# Patient Record
Sex: MALE | Race: WHITE | URBAN - METROPOLITAN AREA
[De-identification: names, ages, dates, MRNs, and addresses within clinical notes are randomized per-mention and may not be internally consistent; named-entity substitution may affect disease eponyms.]

---

## 2017-01-04 ENCOUNTER — PRE VISIT (OUTPATIENT)
Dept: PULMONOLOGY | Facility: CLINIC | Age: 27
End: 2017-01-04

## 2017-01-04 NOTE — TELEPHONE ENCOUNTER
1.  Date/reason for appt:  1/18/17   Allergies    2.  Referring provider:  Self    3.  Call to patient (Yes / No - short description):  Yes, seen at [facility].  Emailed SORAYA form to [email or web address]    Office Visit-12/05/16 Dr Sher    Sleep Visit-12/19/16    4.  Other:  Pt to contact previous provider to get meds sent.

## 2017-01-05 ENCOUNTER — OFFICE VISIT (OUTPATIENT)
Dept: FAMILY MEDICINE | Facility: CLINIC | Age: 27
End: 2017-01-05

## 2017-01-05 VITALS
DIASTOLIC BLOOD PRESSURE: 65 MMHG | WEIGHT: 174.8 LBS | HEIGHT: 74 IN | HEART RATE: 73 BPM | SYSTOLIC BLOOD PRESSURE: 106 MMHG | BODY MASS INDEX: 22.43 KG/M2 | RESPIRATION RATE: 20 BRPM | OXYGEN SATURATION: 97 %

## 2017-01-05 DIAGNOSIS — G47.33 OSA (OBSTRUCTIVE SLEEP APNEA): ICD-10-CM

## 2017-01-05 DIAGNOSIS — R93.1 DECREASED CARDIAC EJECTION FRACTION: Primary | ICD-10-CM

## 2017-01-05 DIAGNOSIS — R68.3 CLUBBING OF NAILS: ICD-10-CM

## 2017-01-05 ASSESSMENT — PAIN SCALES - GENERAL: PAINLEVEL: NO PAIN (0)

## 2017-01-05 NOTE — PATIENT INSTRUCTIONS
Primary Care Center Medication Refill Request Information:  * Please contact your pharmacy regarding ANY request for medication refills.  ** [facility] Prescription Fax = [phone]  * Please allow 3 business days for routine medication refills.  * Please allow 5 business days for controlled substance medication refills.     Primary Care Center Test Result notification information:  *You will be notified with in 7-10 days of your appointment day regarding the results of your test.  If you are on MyChart you will be notified as soon as the provider has reviewed the results and signed off on them.

## 2017-01-05 NOTE — MR AVS SNAPSHOT
After Visit Summary   1/5/2017    Brett Orosco    MRN: [id number]           Patient Information     Date Of Birth          1990        Visit Information        Provider Department      1/5/2017 9:25 AM Robert Sher MD [facility]        Today's Diagnoses     Decreased cardiac ejection fraction    -  1       Care Instructions    Primary Care Center Medication Refill Request Information:  * Please contact your pharmacy regarding ANY request for medication refills.  ** [facility] Prescription Fax = [phone]  * Please allow 3 business days for routine medication refills.  * Please allow 5 business days for controlled substance medication refills.     Primary Care Center Test Result notification information:  *You will be notified with in 7-10 days of your appointment day regarding the results of your test.  If you are on MyChart you will be notified as soon as the provider has reviewed the results and signed off on them.          Follow-ups after your visit        Follow-up notes from your care team     Discussed this visit Return in about 1 year (around 1/5/2018).      Your next 10 appointments already scheduled     Jan 18, 2017  8:35 AM   (Arrive by 8:20 AM)   New Allergy with Charlie Rivera MD   [facility] ([facility])    [address] [address] [phone]           Do not take anti-histamines or Zantac for seven day prior to your appointment.            Feb 24, 2017  8:00 AM   Return Sleep Patient with GAURAV Irene [facility] [place], Sleep Study ([facility])    [address] [place] [address] [phone]              Future tests that were ordered for you today     Open Future Orders        Priority Expected Expires Ordered    Overnight oximetry study Routine 1/5/2017 1/5/2018 1/5/2017     "        Who to contact     Please call your clinic at [phone] to:    Ask questions about your health    Make or cancel appointments    Discuss your medicines    Learn about your test results    Speak to your doctor   If you have compliments or concerns about an experience at your clinic, or if you wish to file a complaint, please contact [facility] Patient Relations at [phone] or email us at [email or web address]         Additional Information About Your Visit        [email or web address] Information     [employer] gives you secure access to your electronic health record. If you see a primary care provider, you can also send messages to your care team and make appointments. If you have questions, please call your primary care clinic.  If you do not have a primary care provider, please call [phone] and they will assist you.      [employer] is an electronic gateway that provides easy, online access to your medical records. With [employer], you can request a clinic appointment, read your test results, renew a prescription or communicate with your care team.     To access your existing account, please contact your [facility] Clinic or call [phone] for assistance.        Care EveryWhere ID     This is your Care EveryWhere ID. This could be used by other organizations to access your [place] medical records  [id number]        Your Vitals Were     Pulse Respirations Height BMI (Body Mass Index) Pulse Oximetry       73 20 1.88 m (6' 2\") 22.43 kg/m2 97%        Blood Pressure from Last 3 Encounters:   01/05/17 106/65   12/19/16 109/54   12/05/16 102/62    Weight from Last 3 Encounters:   01/05/17 79.289 kg (174 lb 12.8 oz)   12/19/16 78.926 kg (174 lb)   12/05/16 77.565 kg (171 lb)               Primary Care Provider Office Phone # Fax #    Robert Sher [phone]        [facility]IC[facility]ANS [address] [place] [address]  [facility] [address]        Thank " you!     Thank you for choosing [facility]  for your care. Our goal is always to provide you with excellent care. Hearing back from our patients is one way we can continue to improve our services. Please take a few minutes to complete the written survey that you may receive in the mail after your visit with us. Thank you!             Your Updated Medication List - Protect others around you: Learn how to safely use, store and throw away your medicines at www.disposemymeds.org.          This list is accurate as of: 1/5/17 10:42 AM.  Always use your most recent med list.                   Brand Name Dispense Instructions for use    CLARITIN PO      Take 10 mg by mouth as needed       fluticasone 50 MCG/ACT spray    FLONASE     Spray 1 spray into both nostrils daily       vitamin D3 2000 UNITS Caps

## 2017-01-05 NOTE — PROGRESS NOTES
"SUBJECTIVE:  Brett Orosco is a 26-year-old gentleman here for followup of last visit where we discussed possibility of obstructive sleep apnea.  He had clubbing of his nails which is a soft call, as he may have family members with nails in the shape he has.  He also has a history of a previous marijuana use, which he has now been abstinent and had an echocardiogram which showed borderline ejection fraction.  He has a more sedentary life recently and is willing to start working towards improved health.  He is not smoking and is motivated for change.    He also has seasonal allergies with rhinitis and will see an allergist soon which I think will help with his nasal congestion.He has been using breath right strips at night assisting with his nighttime breathing.   He is here for followup to discuss test results.  I reviewed his echocardiogram with him showing borderline ejection fraction, everything else looks within normal limits.  The sleep professional recommended possibility of an overnight oximetry which I will order.    He is otherwise feeling well.  Denies any complaints at this time other than some nasal congestion.  Indicates that he went home to [place] where he had more sun exposure and suspects that he has some seasonal affective change as he felt so much better in the sun.  He is currently taking vitamin D, he upped his dose to 5000 units daily.  I discussed that he should probably be on that dose no more than 3 times per week to avoid overdosing as his vitamin D level was in the normal range at the last visit.  He is feeling well at this time and is working on improving his health habits.      OBJECTIVE:/65 mmHg  Pulse 73  Resp 20  Ht 1.88 m (6' 2\")  Wt 79.289 kg (174 lb 12.8 oz)  BMI 22.43 kg/m2  SpO2 97%   GENERAL:  Examination reveals a gentleman who is in no acute distress.     HEENT:  He has some mild nasal congestion.    NECK: Thyroid is normal.  No adenopathy  LUNGS:  Clear.   HEART:  S1, " S2, with regular rate and rhythm.     EXTREMITIES:  Nails, he does have a suggestion of a little bit broader distal nail with possible clubbing, however, on further review, this does appear to be consistent with anatomic variant.   VITAL SIGNS:  His O2 sat today is 97% and blood pressure and vital signs are normal.      ASSESSMENT AND PLAN:  Brett Orosco is a 26-year-old gentleman here for followup.  He is doing well at this time.  He does have  seasonal allergies and Rhinitis with congestion.  He will get in with an allergist.    We are doing a workup for obstructive sleep apnea and will do an overnight oximetry and reviewed his echocardiogram which showed a borderline ejection fraction.  He will make an appointment with Cardiology to clarify  other recommendations, but he will abstain from any type of smoking and he will work on 15-30 minutes most days of the week of aerobic exercise.  I suggested that we repeat his echocardiogram in 1 year.    I reassured him I suspect everything will be normal.  I reviewed his other labs.           Interpretation Summary  Borderline (EF 50-55%) reduced left ventricular function is present. Left  ventricular size is normal. Left ventricular wall thickness is normal. Normal  left ventricular filling for age.  The right ventricle is normal size. Global right ventricular function is  normal.  Both atria appear normal.  Pulmonary artery systolic pressure cannot be assessed.  The inferior vena cava was normal in size with preserved respiratory  variability.  No pericardial effusion is present.  The atrial septum is intact as assessed by color Doppler .    Brett was seen today for results.    Diagnoses and all orders for this visit:    Decreased cardiac ejection fraction  -     Overnight oximetry study; Future  -     order for DME; Overnight oximetry home study    LILI (obstructive sleep apnea)  -     order for DME; Overnight oximetry home study    Clubbing of nails  -     order  for DME; Overnight oximetry home study    All questions were addressed.  He voiced understanding and agreement with the above.     Robert Sher MD

## 2017-01-05 NOTE — NURSING NOTE
Chief Complaint   Patient presents with     Results     patient states he is here to discuss results     REGI TOLBERT at 9:57 AM on 1/5/2017.

## 2017-01-10 ENCOUNTER — APPOINTMENT (OUTPATIENT)
Dept: ULTRASOUND IMAGING | Facility: CLINIC | Age: 27
End: 2017-01-10
Attending: EMERGENCY MEDICINE
Payer: COMMERCIAL

## 2017-01-10 ENCOUNTER — HOSPITAL ENCOUNTER (EMERGENCY)
Facility: CLINIC | Age: 27
Discharge: HOME | End: 2017-01-10
Attending: EMERGENCY MEDICINE | Admitting: EMERGENCY MEDICINE
Payer: COMMERCIAL

## 2017-01-10 VITALS
HEIGHT: 73 IN | TEMPERATURE: 97.1 F | HEART RATE: 64 BPM | WEIGHT: 168 LBS | OXYGEN SATURATION: 98 % | DIASTOLIC BLOOD PRESSURE: 60 MMHG | SYSTOLIC BLOOD PRESSURE: 103 MMHG | BODY MASS INDEX: 22.26 KG/M2 | RESPIRATION RATE: 16 BRPM

## 2017-01-10 DIAGNOSIS — R10.11 RUQ ABDOMINAL PAIN: ICD-10-CM

## 2017-01-10 DIAGNOSIS — K80.50 BILIARY COLIC: ICD-10-CM

## 2017-01-10 LAB
ALBUMIN SERPL-MCNC: 4 G/DL (ref 3.4–5)
ALP SERPL-CCNC: 73 U/L (ref 40–150)
ALT SERPL W P-5'-P-CCNC: 26 U/L (ref 0–70)
ANION GAP SERPL CALCULATED.3IONS-SCNC: 6 MMOL/L (ref 3–14)
AST SERPL W P-5'-P-CCNC: 17 U/L (ref 0–45)
BASOPHILS # BLD AUTO: 0.2 10E9/L (ref 0–0.2)
BASOPHILS NFR BLD AUTO: 4.3 %
BILIRUB DIRECT SERPL-MCNC: <0.1 MG/DL (ref 0–0.2)
BILIRUB SERPL-MCNC: 0.2 MG/DL (ref 0.2–1.3)
BUN SERPL-MCNC: 10 MG/DL (ref 7–30)
CALCIUM SERPL-MCNC: 9 MG/DL (ref 8.5–10.1)
CHLORIDE SERPL-SCNC: 106 MMOL/L (ref 94–109)
CO2 SERPL-SCNC: 29 MMOL/L (ref 20–32)
CREAT SERPL-MCNC: 0.78 MG/DL (ref 0.66–1.25)
DIFFERENTIAL METHOD BLD: NORMAL
EOSINOPHIL # BLD AUTO: 0.2 10E9/L (ref 0–0.7)
EOSINOPHIL NFR BLD AUTO: 3.5 %
ERYTHROCYTE [DISTWIDTH] IN BLOOD BY AUTOMATED COUNT: 12.8 % (ref 10–15)
GFR SERPL CREATININE-BSD FRML MDRD: NORMAL ML/MIN/1.7M2
GLUCOSE SERPL-MCNC: 96 MG/DL (ref 70–99)
HCT VFR BLD AUTO: 41.8 % (ref 40–53)
HGB BLD-MCNC: 14 G/DL (ref 13.3–17.7)
LIPASE SERPL-CCNC: 143 U/L (ref 73–393)
LYMPHOCYTES # BLD AUTO: 2.7 10E9/L (ref 0.8–5.3)
LYMPHOCYTES NFR BLD AUTO: 49.6 %
MCH RBC QN AUTO: 29.2 PG (ref 26.5–33)
MCHC RBC AUTO-ENTMCNC: 33.5 G/DL (ref 31.5–36.5)
MCV RBC AUTO: 87 FL (ref 78–100)
MONOCYTES # BLD AUTO: 0.3 10E9/L (ref 0–1.3)
MONOCYTES NFR BLD AUTO: 5.2 %
NEUTROPHILS # BLD AUTO: 2 10E9/L (ref 1.6–8.3)
NEUTROPHILS NFR BLD AUTO: 37.4 %
PLATELET # BLD AUTO: 248 10E9/L (ref 150–450)
PLATELET # BLD EST: NORMAL 10*3/UL
POTASSIUM SERPL-SCNC: 4.3 MMOL/L (ref 3.4–5.3)
PROT SERPL-MCNC: 7.4 G/DL (ref 6.8–8.8)
RBC # BLD AUTO: 4.79 10E12/L (ref 4.4–5.9)
SODIUM SERPL-SCNC: 140 MMOL/L (ref 133–144)
WBC # BLD AUTO: 5.4 10E9/L (ref 4–11)

## 2017-01-10 PROCEDURE — 76705 ECHO EXAM OF ABDOMEN: CPT | Mod: 26 | Performed by: EMERGENCY MEDICINE

## 2017-01-10 PROCEDURE — 76705 ECHO EXAM OF ABDOMEN: CPT

## 2017-01-10 PROCEDURE — 80048 BASIC METABOLIC PNL TOTAL CA: CPT | Performed by: EMERGENCY MEDICINE

## 2017-01-10 PROCEDURE — 99284 EMERGENCY DEPT VISIT MOD MDM: CPT | Mod: 25 | Performed by: EMERGENCY MEDICINE

## 2017-01-10 PROCEDURE — 76705 ECHO EXAM OF ABDOMEN: CPT | Performed by: EMERGENCY MEDICINE

## 2017-01-10 PROCEDURE — 80076 HEPATIC FUNCTION PANEL: CPT | Performed by: EMERGENCY MEDICINE

## 2017-01-10 PROCEDURE — 85025 COMPLETE CBC W/AUTO DIFF WBC: CPT | Performed by: EMERGENCY MEDICINE

## 2017-01-10 PROCEDURE — 83690 ASSAY OF LIPASE: CPT | Performed by: EMERGENCY MEDICINE

## 2017-01-10 ASSESSMENT — ENCOUNTER SYMPTOMS
ABDOMINAL PAIN: 1
ABDOMINAL DISTENTION: 1
FEVER: 0
SHORTNESS OF BREATH: 0

## 2017-01-10 NOTE — DISCHARGE INSTRUCTIONS
Please make an appointment to follow up with Your Primary Care Provider in 7 days if not improving.  Return for worsening pain, or other concerns.    Call your healthcare provider if any of the following occur:    Pain gets worse or moves to the right lower abdomen    Repeated vomiting    Swelling of the abdomen    Pain lasts over 6 hours    Fever of 100.4  F (38  C) or higher, or as directed by your healthcare provider    Weakness, dizziness    Dark urine or light colored stools    Yellow color of the skin or eyes    Chest, arm, back, neck or jaw pain  Call 911  Get emergency medical care right away if any of these occur:    Trouble breathing    Very confused    Very drowsy or trouble awakening    Fainting or loss of consciousness    Rapid heart rate

## 2017-01-10 NOTE — ED NOTES
PT arrived via foot from work here at the     PT C/O RUQ abdominal pain x 5 days. PT reports pain increases after eating fatty meals. PT reports irregular BM's with constipation, BM's are light in color when they occur.

## 2017-01-10 NOTE — ED AVS SNAPSHOT
[facility], Emergency Department    [address], [place] [address]    Phone:  [phone]                                       Brett Orosco   MRN: [id number]    Department:  [facility], Emergency Department   Date of Visit:  1/10/2017           Patient Information     Date Of Birth          1990        Your diagnoses for this visit were:     RUQ abdominal pain     Biliary colic        You were seen by Hazel Murillo MD.      Follow-up Information     Follow up with Robert Sher MD.    Specialty:  Family Practice    Why:  As needed, If symptoms worsen    Contact information:     PH[facility]ICIANS  [address] [facility] [address]  [facility] [id number] [phone]          Follow up with [facility], Emergency Department.    Specialty:  EMERGENCY MEDICINE    Why:  As needed, If symptoms worsen    Contact information:    [address] [facility] [address] [phone]    Additional information:    The [facility] is located on the corner of [facility] and [facility] on the [facility]. It is easily accessible from virtually any point in the [facility] [place] [facility] area, via [employer] and [employer].        Discharge Instructions       Please make an appointment to follow up with Your Primary Care Provider in 7 days if not improving.  Return for worsening pain, or other concerns.    Call your healthcare provider if any of the following occur:    Pain gets worse or moves to the right lower abdomen    Repeated vomiting    Swelling of the abdomen    Pain lasts over 6 hours    Fever of 100.4  F (38  C) or higher, or as directed by your healthcare provider    Weakness, dizziness    Dark urine or light colored stools    Yellow color of the skin or eyes    Chest, arm, back, neck or jaw pain  Call 911  Get emergency medical care right away if any of these occur:    Trouble breathing    Very confused    Very drowsy or trouble awakening    Fainting or loss of  consciousness    Rapid heart rate    Future Appointments        Provider Department Dept Phone Center    1/18/2017 8:35 AM Charlie Boggs MD [facility] [phone] [facility]    2/24/2017 8:00 AM GAURAV Irene CNP [facility], [place], Sleep Study [phone] [place]      24 Hour Appointment Hotline       To make an appointment at any [facility], call [phone] ([phone]). If you don't have a family doctor or clinic, we will help you find one. [facility] clinics are conveniently located to serve the needs of you and your family.             Review of your medicines      Our records show that you are taking the medicines listed below. If these are incorrect, please call your family doctor or clinic.        Dose / Directions Last dose taken    CLARITIN PO   Dose:  10 mg        Take 10 mg by mouth as needed   Refills:  0        fluticasone 50 MCG/ACT spray   Commonly known as:  FLONASE   Dose:  1 spray   Indication:  Allergic Rhinitis        Spray 1 spray into both nostrils daily   Refills:  0        order for DME   Quantity:  1 each        Overnight oximetry home study   Refills:  0        vitamin D3 2000 UNITS Caps        Refills:  0                Procedures and tests performed during your visit     Basic metabolic panel    CBC with platelets differential    Hepatic panel    Lipase    POC US ABDOMEN LIMITED    US Abdomen Limited      Orders Needing Specimen Collection     None      Pending Results     No orders found from 1/9/2017 to 1/11/2017.            Pending Culture Results     No orders found from 1/9/2017 to 1/11/2017.            Thank you for choosing [facility]       Thank you for choosing [facility] for your care. Our goal is always to provide you with excellent care. Hearing back from our patients is one way we can continue to improve our services. Please take a few minutes to complete the written survey that you may receive in the mail after  you visit with us. Thank you!        [employer] Information     [employer] gives you secure access to your electronic health record. If you see a primary care provider, you can also send messages to your care team and make appointments. If you have questions, please call your primary care clinic.  If you do not have a primary care provider, please call [phone] and they will assist you.        Care EveryWhere ID     This is your Care EveryWhere ID. This could be used by other organizations to access your [place] medical records  [id number]        After Visit Summary       This is your record. Keep this with you and show to your community pharmacist(s) and doctor(s) at your next visit.

## 2017-01-10 NOTE — ED PROVIDER NOTES
[place] EMERGENCY DEPARTMENT ([facility])  January 10, 2017  ED 8 1:27 PM   History     Chief Complaint   Patient presents with     Abdominal Pain     The history is provided by the patient.     Brett Orosco is a 26 year old male who presents with right upper quadrant abdominal pain and pale stools. He states symptoms started Thursday night (5 days ago) with right upper quadrant abdominal pain that worsens within 15 minutes after eating, particularly with eating fatty meals. He also has abdominal bloating after eating.  He has noticed that his bowel movements have been lighter colored. He had breakfast this morning at around 7:30 and is still feeling uncomfortable with this. He is concerned for cholelithiasis. He states the pain is not debilitating but is quite noticeable after eating. He notes history of hyperlipidemia and poor diet in the past and wonders if it has caught up with him. He feels a little constipated as well. No dysuria, hematuria, or difficulty urinating.     I have reviewed the Medications, Allergies, Past Medical and Surgical History, and Social History in the [facility] system.  PAST MEDICAL HISTORY:   Past Medical History   Diagnosis Date     Clubbing of nail      LILI (obstructive sleep apnea)      Seasonal allergies      requires immunotherapy       PAST SURGICAL HISTORY:   Past Surgical History   Procedure Laterality Date     Tonsillectomy & adenoidectomy  1993     Left arm fracture       open repair       FAMILY HISTORY:   Family History   Problem Relation Age of Onset     Lupus Mother 30     Lupus Maternal Aunt 38     Skin Cancer Cousin        SOCIAL HISTORY:   Social History   Substance Use Topics     Smoking status: Never Smoker      Smokeless tobacco: Not on file     Alcohol Use: No       Current Discharge Medication List      CONTINUE these medications which have NOT CHANGED    Details   order for DME Overnight oximetry home study  Qty: 1 each, Refills: 0    Associated  "Diagnoses: Decreased cardiac ejection fraction; LILI (obstructive sleep apnea); Clubbing of nails      Cholecalciferol (VITAMIN D3) 2000 UNITS CAPS       Loratadine (CLARITIN PO) Take 10 mg by mouth as needed      fluticasone (FLONASE) 50 MCG/ACT spray Spray 1 spray into both nostrils daily                Allergies   Allergen Reactions     Cats      Seasonal Allergies Other (See Comments)          Review of Systems   Constitutional: Negative for fever.   Respiratory: Negative for shortness of breath.    Cardiovascular: Negative for chest pain.   Gastrointestinal: Positive for abdominal pain and abdominal distention (bloating).        Light colored stools   All other systems reviewed and are negative.      Physical Exam   BP: 122/67 mmHg  Pulse: 63  Temp: 97.1  F (36.2  C)  Resp: 18  Height: 185.4 cm (6' 1\")  Weight: 76.204 kg (168 lb)  SpO2: 95 %  Physical Exam   Constitutional: He appears well-developed and well-nourished. No distress.   Eyes: Right eye exhibits no discharge. Left eye exhibits no discharge.   Cardiovascular: Normal rate.    No murmur heard.  Pulmonary/Chest: Effort normal. No stridor. No respiratory distress.   Abdominal: He exhibits no distension. There is no tenderness.   Musculoskeletal: He exhibits no edema.   Neurological: He is alert. No cranial nerve deficit.   Skin: Skin is warm. He is not diaphoretic. No erythema.       ED Course   Procedures  Results for orders placed or performed during the hospital encounter of 01/10/17 (from the past 24 hour(s))   CBC with platelets differential   Result Value Ref Range    WBC 5.4 4.0 - 11.0 10e9/L    RBC Count 4.79 4.4 - 5.9 10e12/L    Hemoglobin 14.0 13.3 - 17.7 g/dL    Hematocrit 41.8 40.0 - 53.0 %    MCV 87 78 - 100 fl    MCH 29.2 26.5 - 33.0 pg    MCHC 33.5 31.5 - 36.5 g/dL    RDW 12.8 10.0 - 15.0 %    Platelet Count 248 150 - 450 10e9/L    Diff Method Manual Differential     % Neutrophils 37.4 %    % Lymphocytes 49.6 %    % Monocytes 5.2 %    % " Eosinophils 3.5 %    % Basophils 4.3 %    Absolute Neutrophil 2.0 1.6 - 8.3 10e9/L    Absolute Lymphocytes 2.7 0.8 - 5.3 10e9/L    Absolute Monocytes 0.3 0.0 - 1.3 10e9/L    Absolute Eosinophils 0.2 0.0 - 0.7 10e9/L    Absolute Basophils 0.2 0.0 - 0.2 10e9/L    Platelet Estimate Confirming automated cell count    Basic metabolic panel   Result Value Ref Range    Sodium 140 133 - 144 mmol/L    Potassium 4.3 3.4 - 5.3 mmol/L    Chloride 106 94 - 109 mmol/L    Carbon Dioxide 29 20 - 32 mmol/L    Anion Gap 6 3 - 14 mmol/L    Glucose 96 70 - 99 mg/dL    Urea Nitrogen 10 7 - 30 mg/dL    Creatinine 0.78 0.66 - 1.25 mg/dL    GFR Estimate >90  Non  GFR Calc   >60 mL/min/1.7m2    GFR Estimate If Black >90   GFR Calc   >60 mL/min/1.7m2    Calcium 9.0 8.5 - 10.1 mg/dL   Lipase   Result Value Ref Range    Lipase 143 73 - 393 U/L   Hepatic panel   Result Value Ref Range    Bilirubin Direct <0.1 0.0 - 0.2 mg/dL    Bilirubin Total 0.2 0.2 - 1.3 mg/dL    Albumin 4.0 3.4 - 5.0 g/dL    Protein Total 7.4 6.8 - 8.8 g/dL    Alkaline Phosphatase 73 40 - 150 U/L    ALT 26 0 - 70 U/L    AST 17 0 - 45 U/L   POC US ABDOMEN LIMITED    [facility] Procedure Note      Limited Bedside ED Gallbladder  Ultrasound:    PROCEDURE: PERFORMED BY: Dr. Hazel Murillo  INDICATIONS:  RUQ/Epigastric Pain  PROBE:  Low frequency convex probe  BODY LOCATION: Abdomen  FINDINGS:   An ultrasound of the gallbladder was performed using  longitudinal and transverse views.  Gallstone(s):  Absent  Gallbladder sludge:  Possible  Sonographic Baxter's sign:  Absent  Gallbladder wall thickening (greater than 4 mm):  Absent  Pericholecystic fluid: Absent  Common bile duct (dilated if internal diameter greater than 6  mm): 3.1 mm   INTERPRETATION:  The gallbladder evaluation is normal with no  gallstones/sludge, no sonographic Baxter s sign, no GB wall  thickening, no pericholecystic fluid, and without evidence  of  cholelithiasis or cholecystitis.  IMAGE DOCUMENTATION: Images were archived to PACs system.    Hazel Murillo MD       US Abdomen Limited    Narrative    EXAMINATION: Limited Abdominal Ultrasound, 1/10/2017 2:59 PM     COMPARISON: None.    HISTORY: Postprandial abdominal pain.    FINDINGS:   Fluid: No evidence of ascites or pleural effusions.    Liver: The liver demonstrates normal echotexture, measuring 15.2 cm in  craniocaudal dimension. There is no focal mass.     Gallbladder: There is no wall thickening, pericholecystic fluid,  positive sonographic Baxter's sign or evidence for cholelithiasis.    Bile Ducts: Both the intra- and extrahepatic biliary system are of  normal caliber.  The common bile duct measures 3 mm in diameter.    Pancreas: Visualized portions of the pancreas are unremarkable.     Kidney: The right kidney measures 11.1 cm long. There is no  hydronephrosis or mass.       Impression    IMPRESSION:     Negative right upper quadrant ultrasound. No evidence of  cholelithiasis or cholecystitis.    SABRINA MONCADA MD     Medications - No data to display      Assessments & Plan (with Medical Decision Making)   26 year old male who is presenting with symptoms that are quite consistent with biliary etiologies. However, the patient s labs and Emergency Department ultrasound were within normal limits. Given that I saw what may have been a bit of sludge in the gallbladder, I did send her for a formal ultrasound which confirmed a normal gallbladder. Patient does not have any pain at this point and his pain is only episodic after eating.  This is still suspicious for biliary pathologies. We talked about follow up with his primary-care provider and for further evaluation in the outpatient setting if his pain persist. No emergent concern at this point.     I have reviewed the nursing notes.    I have reviewed the findings, diagnosis, plan and need for follow up with the patient.    Current Discharge Medication  List          Final diagnoses:   RUQ abdominal pain   Biliary colic     IShayla, am serving as a trained medical scribe to document services personally performed by Hazel Murillo MD, based on the provider's statements to me.  This document has been checked and approved by the attending provider.    Hazel SIMMS MD, was physically present and have reviewed and verified the accuracy of this note documented by Shayla Garcia, medical scribe.     1/10/2017   [facility], [place], EMERGENCY DEPARTMENT      Hazel Murillo MD  01/10/17 [id number]

## 2017-01-10 NOTE — ED AVS SNAPSHOT
[facility], [place], Emergency Department    [address] [place] [address]    Phone:  [phone]                                       Brett Orosco   MRN: [id number]    Department:  [facility], Emergency Department   Date of Visit:  1/10/2017           After Visit Summary Signature Page     I have received my discharge instructions, and my questions have been answered. I have discussed any challenges I see with this plan with the nurse or doctor.    ..........................................................................................................................................  Patient/Patient Representative Signature      ..........................................................................................................................................  Patient Representative Print Name and Relationship to Patient    ..................................................               ................................................  Date                                            Time    ..........................................................................................................................................  Reviewed by Signature/Title    ...................................................              ..............................................  Date                                                            Time

## 2017-01-11 ENCOUNTER — CARE COORDINATION (OUTPATIENT)
Dept: PULMONOLOGY | Facility: CLINIC | Age: 27
End: 2017-01-11

## 2017-01-11 NOTE — PROGRESS NOTES
Writer contacted patient to remind him to hold antihistamines for a week before appointment. He stated that his allergy testing was completed in [place], and that [place] was to be shipping over his allergy serums. At this time they have not arrived to clinic. Patient agrees to hold his claritin for the week in the event other testing needs to be done while at currently scheduled appointment.

## 2017-01-13 ASSESSMENT — ENCOUNTER SYMPTOMS
HEARTBURN: 1
TASTE DISTURBANCE: 0
SMELL DISTURBANCE: 0
BLOATING: 1
BOWEL INCONTINENCE: 0
CONSTIPATION: 1
DIARRHEA: 0
RECTAL PAIN: 0
VOMITING: 0
SINUS CONGESTION: 1
NAUSEA: 0
NECK MASS: 0
ABDOMINAL PAIN: 1
SINUS PAIN: 0
JAUNDICE: 0
SORE THROAT: 0
TROUBLE SWALLOWING: 0
BLOOD IN STOOL: 0
HOARSE VOICE: 0

## 2017-01-17 ENCOUNTER — TRANSFERRED RECORDS (OUTPATIENT)
Dept: HEALTH INFORMATION MANAGEMENT | Facility: CLINIC | Age: 27
End: 2017-01-17

## 2017-01-17 ENCOUNTER — TELEPHONE (OUTPATIENT)
Dept: SLEEP MEDICINE | Facility: CLINIC | Age: 27
End: 2017-01-17

## 2017-01-17 NOTE — TELEPHONE ENCOUNTER
Records received from [facility], will forward to clinic. Waiting for image.   Included:   Home sleep study   Xray chest on 11/6/14  Allergy test

## 2017-01-18 ENCOUNTER — OFFICE VISIT (OUTPATIENT)
Dept: PULMONOLOGY | Facility: CLINIC | Age: 27
End: 2017-01-18
Attending: ALLERGY & IMMUNOLOGY
Payer: COMMERCIAL

## 2017-01-18 VITALS
DIASTOLIC BLOOD PRESSURE: 61 MMHG | HEART RATE: 72 BPM | RESPIRATION RATE: 16 BRPM | OXYGEN SATURATION: 94 % | SYSTOLIC BLOOD PRESSURE: 96 MMHG

## 2017-01-18 DIAGNOSIS — J30.81 ALLERGIC RHINITIS DUE TO ANIMAL HAIR AND DANDER, UNSPECIFIED RHINITIS SEASONALITY: Primary | ICD-10-CM

## 2017-01-18 DIAGNOSIS — J30.81 NON-SEASONAL ALLERGIC RHINITIS DUE TO ANIMAL HAIR AND DANDER: ICD-10-CM

## 2017-01-18 PROCEDURE — 99212 OFFICE O/P EST SF 10 MIN: CPT | Mod: ZF

## 2017-01-18 PROCEDURE — 99212 OFFICE O/P EST SF 10 MIN: CPT | Mod: 25

## 2017-01-18 PROCEDURE — 95117 IMMUNOTHERAPY INJECTIONS: CPT | Mod: ZF

## 2017-01-18 ASSESSMENT — PAIN SCALES - GENERAL: PAINLEVEL: NO PAIN (0)

## 2017-01-18 NOTE — MR AVS SNAPSHOT
After Visit Summary   1/18/2017    Brett Orosco    MRN: [id number]           Patient Information     Date Of Birth          1990        Visit Information        Provider Department      1/18/2017 8:35 AM Charlie Rivera MD [facility]        Today's Diagnoses     Allergic rhinitis due to animal hair and dander, unspecified rhinitis seasonality    -  1     Non-seasonal allergic rhinitis due to animal hair and dander            Follow-ups after your visit        Follow-up notes from your care team     Return in about 3 months (around 4/18/2017).      Your next 10 appointments already scheduled     Jan 25, 2017  9:30 AM   Nurse Visit with [facility] NURSE   [facility] ([facility])    [address] [facility] [address] [phone]            Feb 24, 2017  8:00 AM   Return Sleep Patient with GAURAV Irene [facility] [place], Sleep Study ([facility])    [address] [place] [address] [phone]              Who to contact     If you have questions or need follow up information about today's clinic visit or your schedule please contact [facility] directly at [phone].  Normal or non-critical lab and imaging results will be communicated to you by MyChart, letter or phone within 4 business days after the clinic has received the results. If you do not hear from us within 7 days, please contact the clinic through MyChart or phone. If you have a critical or abnormal lab result, we will notify you by phone as soon as possible.  Submit refill requests through [facility] or call your pharmacy and they will forward the refill request to us. Please allow 3 business days for your refill to be completed.          Additional Information About Your Visit        MyChart Information      [employer] gives you secure access to your electronic health record. If you see a primary care provider, you can also send messages to your care team and make appointments. If you have questions, please call your primary care clinic.  If you do not have a primary care provider, please call [phone] and they will assist you.        Care EveryWhere ID     This is your Care EveryWhere ID. This could be used by other organizations to access your [place] medical records  [id number]        Your Vitals Were     Pulse Respirations Pulse Oximetry             72 16 94%          Blood Pressure from Last 3 Encounters:   01/18/17 96/61   01/10/17 103/60   01/05/17 106/65    Weight from Last 3 Encounters:   01/10/17 76.204 kg (168 lb)   01/05/17 79.289 kg (174 lb 12.8 oz)   12/19/16 78.926 kg (174 lb)              Today, you had the following     No orders found for display       Primary Care Provider Office Phone # Fax #    Robert Sher [phone]        [facility] [address] [place] [address] [phone]  [facility] [address]        Thank you!     Thank you for choosing [facility]  for your care. Our goal is always to provide you with excellent care. Hearing back from our patients is one way we can continue to improve our services. Please take a few minutes to complete the written survey that you may receive in the mail after your visit with us. Thank you!             Your Updated Medication List - Protect others around you: Learn how to safely use, store and throw away your medicines at www.disposemymeds.org.          This list is accurate as of: 1/18/17  9:25 AM.  Always use your most recent med list.                   Brand Name Dispense Instructions for use    CLARITIN PO      Take 10 mg by mouth as needed       fluticasone 50 MCG/ACT spray    FLONASE     Spray 1 spray into both nostrils daily       order for DME     1 each    Overnight oximetry home study       vitamin D3  2000 UNITS Caps

## 2017-01-18 NOTE — PROGRESS NOTES
Reason for Visit  Brett Orosco is a 26 year old male who is referred by Robert Sher for allergies    Allergy HPI  SUBJECTIVE:  Brett presents today for initial consultation regarding concern of environmental allergies.  He has had allergies most of his life.  He was on allergy shots in 2012 for about 6-8 months and did notice a significant benefit and then he moved to [place] and was working at [employer] and noted a lot of problems there and therefore at the [facility] he was started back on allergy shots in August; however, he has not gotten up very far and then stopped getting the injections when he moved here.  He does not have any problems with the injections and it was noted that he is on propranolol occasionally; however, he has not taken that in a few months.  He also notes that his main symptoms are chronic nasal congestion which seems to be constant, occasional sneezing, itchy eyes, itchy nose, dogs and cats seem to cause problems for him.  He uses Flonase 1 spray each nostril daily as well as Claritin.  He used to use Sudafed regularly.  It did help but it did seem to make him anxious.  He also has some sleep problems.  He has tried CPAP.  He has trouble falling asleep and staying asleep.  He wonders if allergies are a factor.  He did have a pulse ox at home and says the levels at night are about 94-95%.  He generally says his breathing is fine.  He sometimes wakes up coughing in the morning.  He used to smoke for a long time but he is otherwise without any respiratory issues.      SOCIAL HISTORY:  He has no pets, but his girlfriend has a family with cat and dog and they live close by.  He used to smoke from age 16 up until this summer.         The patient was seen and examined by Charlie Boggs MD   Current Outpatient Prescriptions   Medication     order for DME     Cholecalciferol (VITAMIN D3) 2000 UNITS CAPS     Loratadine (CLARITIN PO)     fluticasone (FLONASE) 50  MCG/ACT spray     No current facility-administered medications for this visit.     Allergies   Allergen Reactions     Cats      Seasonal Allergies Other (See Comments)     Social History     Social History     Marital Status: Single     Spouse Name: N/A     Number of Children: N/A     Years of Education: N/A     Occupational History     Not on file.     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  BP 96/61 mmHg  Pulse 72  Resp 16  SpO2 94%  Exam:   GENERAL APPEARANCE: Well developed, well nourished, alert, and in no apparent distress.  EYES: PERRL, EOMI, conjunctiva clear non-injected  HENT: Nasal mucosa with no edema and no discharge. No nasal polyps.    EARS: Canals clear, TMs normal  MOUTH: Oral mucosa is moist, without any lesions, no tonsillar enlargement, no oropharyngeal exudate.  RESP: Good air flow throughout.  No crackles. No rhonchi. No wheezes.  CV: Normal S1, S2, regular rhythm, normal rate. No murmur.  No rub. No gallop. No LE edema.   MS: Extremities normal. No clubbing. No cyanosis.  SKIN: No rashes noted  NEURO: Speech normal, normal strength and tone, normal gait and stance  PSYCH: Normal  mentation, orientation to person, place, and time.  Results:Skin tests from an outside clinic on 07/14/2016 show positive sensitivities to skinny, birch, elm, hickory, maple, oak, tree pollen, grass mix, ragweed, as well as weed mix, Alternaria mold, cladosporium mold, Aspergillus mold, Helminthosporium mold and Fusarium mold, dog, cat and dust mites were positive.  He was negative to cockroach, negative penicillium mix.           Assessment and plan: ASSESSMENT AND PLAN:  Brett has a history of allergic rhinitis was tested 20 [place] at [facility], and was noted to have many allergens and is on allergy immunotherapy.  We will continue his treatment here.  He is a very early phase on a sensitive schedule and will see how he is doing.  If he is not doing well, we will probably retest him as he is on high dose allergy shots.  I do recommend he increase his Flonase 2 sprays in each nostril daily as well as continue the Claritin for now.  We did discuss his concern of clubbing.  His mom said that he has clubbing of his fingers and actually looking at them, I do not feel that there is actually clubbing, just mildly abnormal shaped fingernails.  He was concerned about his pulse ox dropping to 94-95% when sleeping.  I am not too concerned about that level at this time, but he can follow up with whoever gave him that pulse ox machine.  It is good he stopped smoking.  There was a concern about thrush in his mouth.  I do feel like this is thrush, most likely irritation from chronic smoking.  I will see him back in about 3 months.                 Answers for HPI/ROS submitted by the patient on 1/13/2017   General Symptoms: No  Skin Symptoms: No  HENT Symptoms: Yes  EYE SYMPTOMS: No  HEART SYMPTOMS: No  LUNG SYMPTOMS: No  INTESTINAL SYMPTOMS: Yes  URINARY SYMPTOMS: No  REPRODUCTIVE SYMPTOMS: No  SKELETAL SYMPTOMS: No  BLOOD SYMPTOMS: No  NERVOUS SYSTEM SYMPTOMS: No  MENTAL HEALTH SYMPTOMS: No  Ear pain:  No  Ear discharge: No  Hearing loss: No  Tinnitus: No  Nosebleeds: No  Congestion: Yes  Sinus pain: No  Trouble swallowing: No   Voice hoarseness: No  Mouth sores: No  Sore throat: No  Tooth pain: No  Gum tenderness: No  Bleeding gums: No  Change in taste: No  Change in sense of smell: No  Dry mouth: No  Hearing aid used: No  Neck lump: No  Heart burn or indigestion: Yes  Nausea: No  Vomiting: No  Abdominal pain: Yes  Bloating: Yes  Constipation: Yes  Diarrhea: No  Blood in stool: No  Black stools: No  Rectal or Anal pain: No  Fecal incontinence: No  Yellowing of skin or eyes: No  Vomit with blood: No  Change in stools: Yes  Hemorrhoids: No

## 2017-01-18 NOTE — Clinical Note
1/18/2017       RE: Brett Orosco  [address]  [facility] [address]     Dear Colleague,    Thank you for referring your patient, Brett Orosco, to the [facility] FOR [facility]G [facility]ENCE AND HE[facility] at [facility]. Please see a copy of my visit note below.    Reason for Visit  Brett Orosco is a 26 year old male who is referred by Robert Sher for allergies    Allergy HPI  SUBJECTIVE:  Brett presents today for initial consultation regarding concern of environmental allergies.  He has had allergies most of his life.  He was on allergy shots in 2012 for about 6-8 months and did notice a significant benefit and then he moved to [place] and was working at [employer] and noted a lot of problems there and therefore at the [facility] he was started back on allergy shots in August; however, he has not gotten up very far and then stopped getting the injections when he moved here.  He does not have any problems with the injections and it was noted that he is on propranolol occasionally; however, he has not taken that in a few months.  He also notes that his main symptoms are chronic nasal congestion which seems to be constant, occasional sneezing, itchy eyes, itchy nose, dogs and cats seem to cause problems for him.  He uses Flonase 1 spray each nostril daily as well as Claritin.  He used to use Sudafed regularly.  It did help but it did seem to make him anxious.  He also has some sleep problems.  He has tried CPAP.  He has trouble falling asleep and staying asleep.  He wonders if allergies are a factor.  He did have a pulse ox at home and says the levels at night are about 94-95%.  He generally says his breathing is fine.  He sometimes wakes up coughing in the morning.  He used to smoke for a long time but he is otherwise without any respiratory issues.      SOCIAL HISTORY:  He has no pets, but his girlfriend has a family with cat and dog and they  live close by.  He used to smoke from age 16 up until this summer.         The patient was seen and examined by Charlie Boggs MD   Current Outpatient Prescriptions   Medication     order for DME     Cholecalciferol (VITAMIN D3) 2000 UNITS CAPS     Loratadine (CLARITIN PO)     fluticasone (FLONASE) 50 MCG/ACT spray     No current facility-administered medications for this visit.     Allergies   Allergen Reactions     Cats      Seasonal Allergies Other (See Comments)     Social History     Social History     Marital Status: Single     Spouse Name: N/A     Number of Children: N/A     Years of Education: N/A     Occupational History     Not on file.     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     Significant other female     Past Medical History   Diagnosis Date     Clubbing of nail      LILI (obstructive sleep apnea)      Seasonal allergies      requires immunotherapy     Past Surgical History   Procedure Laterality Date     Tonsillectomy & adenoidectomy  1993     Left arm fracture       open repair     Family History   Problem Relation Age of Onset     Lupus Mother 30     Lupus Maternal Aunt 38     Skin Cancer Cousin          ROS   A complete ROS was otherwise negative except as noted in the HPI and the end of the note.  BP 96/61 mmHg  Pulse 72  Resp 16  SpO2 94%  Exam:   GENERAL APPEARANCE: Well developed, well nourished, alert, and in no apparent distress.  EYES: PERRL, EOMI, conjunctiva clear non-injected  HENT: Nasal mucosa with no edema and no discharge. No nasal polyps.    EARS: Canals clear, TMs normal  MOUTH: Oral mucosa is moist, without any lesions, no tonsillar enlargement, no oropharyngeal exudate.  RESP: Good air  flow throughout.  No crackles. No rhonchi. No wheezes.  CV: Normal S1, S2, regular rhythm, normal rate. No murmur.  No rub. No gallop. No LE edema.   MS: Extremities normal. No clubbing. No cyanosis.  SKIN: No rashes noted  NEURO: Speech normal, normal strength and tone, normal gait and stance  PSYCH: Normal mentation, orientation to person, place, and time.  Results:Skin tests from an outside clinic on 07/14/2016 show positive sensitivities to skinny, birch, elm, hickory, maple, oak, tree pollen, grass mix, ragweed, as well as weed mix, Alternaria mold, cladosporium mold, Aspergillus mold, Helminthosporium mold and Fusarium mold, dog, cat and dust mites were positive.  He was negative to cockroach, negative penicillium mix.           Assessment and plan: ASSESSMENT AND PLAN:  Brett has a history of allergic rhinitis was tested [address] [place] [address] at [facility], and was noted to have many allergens and is on allergy immunotherapy.  We will continue his treatment here.  He is a very early phase on a sensitive schedule and will see how he is doing.  If he is not doing well, we will probably retest him as he is on high dose allergy shots.  I do recommend he increase his Flonase 2 sprays in each nostril daily as well as continue the Claritin for now.  We did discuss his concern of clubbing.  His mom said that he has clubbing of his fingers and actually looking at them, I do not feel that there is actually clubbing, just mildly abnormal shaped fingernails.  He was concerned about his pulse ox dropping to 94-95% when sleeping.  I am not too concerned about that level at this time, but he can follow up with whoever gave him that pulse ox machine.  It is good he stopped smoking.  There was a concern about thrush in his mouth.  I do feel like this is thrush, most likely irritation from chronic smoking.  I will see him back in about 3 months.     Again, thank you for allowing me to participate in the care of your  patient.      Sincerely,    Charlie Boggs MD

## 2017-01-18 NOTE — NURSING NOTE
Brett did not stay in our lobby for 30 minutes in case of reaction, his injection sites were not evaluated before he left.  We will reiterate the importance of him waiting 30 minutes in our lobby at next visit. Yasmine Del Toro CMA on 1/18/2017

## 2017-01-18 NOTE — NURSING NOTE
Chief Complaint   Patient presents with     Allergy Consult     Patient is being seen for consultation of allergies      Teresita Perez CMA at 8:39 AM on 1/18/2017

## 2017-01-20 ENCOUNTER — TELEPHONE (OUTPATIENT)
Dept: SLEEP MEDICINE | Facility: CLINIC | Age: 27
End: 2017-01-20

## 2017-01-20 NOTE — TELEPHONE ENCOUNTER
----- Message from GAURAV Irene CNP sent at 1/15/2017  7:20 PM CST -----  Please schedule laurel as ordered by Dr. Sher.    Let me know if this doesn't work.  Pt has follow up with Me.    Thanks,  WM

## 2017-01-20 NOTE — TELEPHONE ENCOUNTER
Spoke with patient he states that Brittanie delivered pulse oximeter to him on 1/17/17 and picked up the next day.

## 2017-01-23 ENCOUNTER — TELEPHONE (OUTPATIENT)
Dept: SLEEP MEDICINE | Facility: CLINIC | Age: 27
End: 2017-01-23

## 2017-01-23 NOTE — TELEPHONE ENCOUNTER
WI sleep with a home sleep study, RDI of 11.8, supine of 25.4, overnight oximetry probe off for 2 periods of time, likely underestimating severity of his study.  4 mins with 02 sat <88%.  Download of apap 5-10cm h20 shows residual AI 7.8, AHI 8.5, central  5.1.      With call, states sleep was not better with apap.     PCP has ordered his laurel: pt states light was off on the machine with a wakeup at 4A, he turned the machine back on and fell asleep again.    Currently treated his allergies: started allergy shots, nasal sprays, humidifier, breathe rites.     He's started bright light therapy, feels better with it.

## 2017-01-25 ENCOUNTER — ALLIED HEALTH/NURSE VISIT (OUTPATIENT)
Dept: PULMONOLOGY | Facility: CLINIC | Age: 27
End: 2017-01-25
Attending: ALLERGY & IMMUNOLOGY
Payer: COMMERCIAL

## 2017-01-25 DIAGNOSIS — Z51.6 NEED FOR DESENSITIZATION TO ALLERGENS: Primary | ICD-10-CM

## 2017-01-25 PROCEDURE — 95117 IMMUNOTHERAPY INJECTIONS: CPT | Mod: ZF

## 2017-01-25 NOTE — MR AVS SNAPSHOT
After Visit Summary   1/25/2017    Brett Orosco    MRN: [id number]           Patient Information     Date Of Birth          1990        Visit Information        Provider Department      1/25/2017 9:30 AM [facility] NURSE [facility]        Today's Diagnoses     Need for desensitization to allergens    -  1        Follow-ups after your visit        Your next 10 appointments already scheduled     Jan 25, 2017  9:30 AM   Nurse Visit with [facility] NURSE   [facility] ([facility])    [address] [facility]  [address] [facility] [address]   [phone]            Jan 30, 2017 10:00 AM   Nurse Visit with [facility] NURSE   [facility] ([facility])    [address] [facility]  [address] [facility] [address]   [phone]            Feb 03, 2017  8:30 AM   Nurse Visit with [facility] NURSE   [facility] ([facility])    [address] [address]   [phone]            Feb 06, 2017  9:00 AM   Nurse Visit with [facility] NURSE   [facility] for Lung Science and Health ([facility])    [address] [facility]  [address] [facility] [address]   [phone]            Feb 10, 2017  8:30 AM   Nurse Visit with [facility] NURSE   [facility] for [facility] and Health ([facility])    [address] [address]   [phone]            Feb 24, 2017  8:00 AM   Return Sleep Patient with GAURAV Irene [facility] [place], Sleep Study ([facility])    [address] [place] [id number] [phone]              Who to contact     If you have questions or need follow up information about today's clinic visit or your schedule  please contact [facility] FOR LUNG SCIENCE AND HEALTH directly at [phone].  Normal or non-critical lab and imaging results will be communicated to you by MyChart, letter or phone within 4 business days after the clinic has received the results. If you do not hear from us within 7 days, please contact the clinic through [employer] or phone. If you have a critical or abnormal lab result, we will notify you by phone as soon as possible.  Submit refill requests through [employer] or call your pharmacy and they will forward the refill request to us. Please allow 3 business days for your refill to be completed.          Additional Information About Your Visit        [employer] Information     [employer] gives you secure access to your electronic health record. If you see a primary care provider, you can also send messages to your care team and make appointments. If you have questions, please call your primary care clinic.  If you do not have a primary care provider, please call [phone] and they will assist you.        Care EveryWhere ID     This is your Care EveryWhere ID. This could be used by other organizations to access your [place] medical records  [id number]         Blood Pressure from Last 3 Encounters:   01/18/17 96/61   01/10/17 103/60   01/05/17 106/65    Weight from Last 3 Encounters:   01/10/17 76.204 kg (168 lb)   01/05/17 79.289 kg (174 lb 12.8 oz)   12/19/16 78.926 kg (174 lb)              Today, you had the following     No orders found for display       Primary Care Provider Office Phone # Fax #    Robert Sher [phone]        [facility]ICIANS [address] [place] [address]  [facility] [address]        Thank you!     Thank you for choosing [facility] LUNG SCIENCE AND HEALTH  for your care. Our goal is always to provide you with excellent care. Hearing back from our patients is one way we can continue to improve our services. Please take a few minutes to complete the written  survey that you may receive in the mail after your visit with us. Thank you!             Your Updated Medication List - Protect others around you: Learn how to safely use, store and throw away your medicines at www.disposemymeds.org.          This list is accurate as of: 1/25/17  9:26 AM.  Always use your most recent med list.                   Brand Name Dispense Instructions for use    CLARITIN PO      Take 10 mg by mouth as needed       fluticasone 50 MCG/ACT spray    FLONASE     Spray 1 spray into both nostrils daily       order for DME     1 each    Overnight oximetry home study       vitamin D3 2000 UNITS Caps

## 2017-01-25 NOTE — NURSING NOTE
Patient came in for weekly Allergy injection. Patient had no reaction to last weeks shots. I gave 0.2 ml sub cutaneous without any issues.     Brett Orosco comes into clinic today at the request of Robert Sher for allergy injections    No reaction to last injection.    This service provided today was under the direct supervision of Dr. Rivera, who was available if needed.    Kishor Holliday, OLIVIER Holliday CMA at 9:06 AM on 1/25/2017

## 2017-01-30 ENCOUNTER — ALLIED HEALTH/NURSE VISIT (OUTPATIENT)
Dept: PULMONOLOGY | Facility: CLINIC | Age: 27
End: 2017-01-30
Attending: ALLERGY & IMMUNOLOGY
Payer: COMMERCIAL

## 2017-01-30 DIAGNOSIS — Z51.6 NEED FOR DESENSITIZATION TO ALLERGENS: Primary | ICD-10-CM

## 2017-01-30 PROCEDURE — 95117 IMMUNOTHERAPY INJECTIONS: CPT | Mod: ZF

## 2017-01-30 NOTE — MR AVS SNAPSHOT
After Visit Summary   1/30/2017    Brett Orosco    MRN: [id number]           Patient Information     Date Of Birth          1990        Visit Information        Provider Department      1/30/2017 10:00 AM [facility] NURSE [facility]         Follow-ups after your visit        Your next 10 appointments already scheduled     Feb 03, 2017  8:30 AM   Nurse Visit with [facility] NURSE   [facility] ([facility])    [address] [facility]  [address] [facility] [address] [phone]            Feb 06, 2017  9:00 AM   Nurse Visit with [facility] NURSE   [facility] ([facility])    [address] [facility]  [address] [facility] [address] [phone]            Feb 10, 2017  8:30 AM   Nurse Visit with [facility] NURSE   [facility] ([facility])    [address] [address] [phone]            Feb 24, 2017  8:00 AM   Return Sleep Patient with GAURAV Irene CNP   [facility] [place], Sleep Study ([facility])    [address] [place] [phone]              Who to contact     If you have questions or need follow up information about today's clinic visit or your schedule please contact [facility] LUNG SCIENCE AND HEALTH directly at [phone].  Normal or non-critical lab and imaging results will be communicated to you by MyChart, letter or phone within 4 business days after the clinic has received the results. If you do not hear from us within 7 days, please contact the clinic through MyChart or phone. If you have a critical or abnormal lab result, we will notify you by phone as soon as possible.  Submit refill requests through [employer] or call your pharmacy and they will forward  the refill request to us. Please allow 3 business days for your refill to be completed.          Additional Information About Your Visit        [employer] Information     [employer] gives you secure access to your electronic health record. If you see a primary care provider, you can also send messages to your care team and make appointments. If you have questions, please call your primary care clinic.  If you do not have a primary care provider, please call [phone] and they will assist you.        Care EveryWhere ID     This is your Care EveryWhere ID. This could be used by other organizations to access your [place] medical records  [id number]         Blood Pressure from Last 3 Encounters:   01/18/17 96/61   01/10/17 103/60   01/05/17 106/65    Weight from Last 3 Encounters:   01/10/17 76.204 kg (168 lb)   01/05/17 79.289 kg (174 lb 12.8 oz)   12/19/16 78.926 kg (174 lb)              Today, you had the following     No orders found for display       Primary Care Provider Office Phone # Fax #    Robert Sher [phone]        PHYSICIANS [address] [facility] [address]  [facility] [address]        Thank you!     Thank you for choosing [facility] FOR LUNG SCIENCE AND HEALTH  for your care. Our goal is always to provide you with excellent care. Hearing back from our patients is one way we can continue to improve our services. Please take a few minutes to complete the written survey that you may receive in the mail after your visit with us. Thank you!             Your Updated Medication List - Protect others around you: Learn how to safely use, store and throw away your medicines at www.disposemymeds.org.          This list is accurate as of: 1/30/17 11:03 AM.  Always use your most recent med list.                   Brand Name Dispense Instructions for use    CLARITIN PO      Take 10 mg by mouth as needed       fluticasone 50 MCG/ACT spray    FLONASE     Spray 1 spray into both nostrils daily        order for DME     1 each    Overnight oximetry home study       vitamin D3 2000 UNITS Caps

## 2017-01-30 NOTE — NURSING NOTE
Chief Complaint   Patient presents with     Allergy Injection     Patient is here for prescribed allergy injection     Brett Orosco comes into clinic today at the request of Robert Sher for allergy injections    No reaction to last injection.    This service provided today was under the direct supervision of Dr. Alcantar, who was available if needed.    Teresita Jerome CMA

## 2017-02-03 ENCOUNTER — ALLIED HEALTH/NURSE VISIT (OUTPATIENT)
Dept: PULMONOLOGY | Facility: CLINIC | Age: 27
End: 2017-02-03
Attending: ALLERGY & IMMUNOLOGY
Payer: COMMERCIAL

## 2017-02-03 DIAGNOSIS — Z51.6 NEED FOR DESENSITIZATION TO ALLERGENS: Primary | ICD-10-CM

## 2017-02-03 PROCEDURE — 95117 IMMUNOTHERAPY INJECTIONS: CPT | Mod: ZF

## 2017-02-03 NOTE — NURSING NOTE
Patient came in for weekly Allergy injection. Patient had no reaction to last weeks shots. I gave 0.75 ml sub cutaneous without any issues.   Brett Orosco comes into clinic today at the request of Robert Sher for allergy injections    No reaction to last injection.    This service provided today was under the direct supervision of Dr. Rivera, who was available if needed.    Kishor Holliday, OLIVIER Holldiay CMA at 9:08 AM on 2/3/2017

## 2017-02-03 NOTE — MR AVS SNAPSHOT
After Visit Summary   2/3/2017    Brett Orosco    MRN: [id number]           Patient Information     Date Of Birth          1990        Visit Information        Provider Department      2/3/2017 8:30 AM [facility] NURSE [facility]        Today's Diagnoses     Need for desensitization to allergens    -  1        Follow-ups after your visit        Your next 10 appointments already scheduled     Feb 06, 2017  9:00 AM   Nurse Visit with [facility] NURSE   [facility] ([facility])    [address] [place] [address] [phone]            Feb 10, 2017  8:30 AM   Nurse Visit with [facility] NURSE   [facility] ([facility])    [address] [place] [address] [phone]            Feb 24, 2017  8:00 AM   Return Sleep Patient with GAURAV Irene [facility] [place], Sleep Study ([facility])    [address] [place] [address] [phone]              Who to contact     If you have questions or need follow up information about today's clinic visit or your schedule please contact [facility] LUNG SCIENCE AND HEALTH directly at [phone].  Normal or non-critical lab and imaging results will be communicated to you by MyChart, letter or phone within 4 business days after the clinic has received the results. If you do not hear from us within 7 days, please contact the clinic through MyChart or phone. If you have a critical or abnormal lab result, we will notify you by phone as soon as possible.  Submit refill requests through [employer] or call your pharmacy and they will forward the refill request to us. Please allow 3 business days for your refill to be completed.          Additional Information About Your Visit        [facility]  Information     [employer] gives you secure access to your electronic health record. If you see a primary care provider, you can also send messages to your care team and make appointments. If you have questions, please call your primary care clinic.  If you do not have a primary care provider, please call [phone] and they will assist you.        Care EveryWhere ID     This is your Care EveryWhere ID. This could be used by other organizations to access your [place] medical records  [id number]         Blood Pressure from Last 3 Encounters:   01/18/17 96/61   01/10/17 103/60   01/05/17 106/65    Weight from Last 3 Encounters:   01/10/17 76.204 kg (168 lb)   01/05/17 79.289 kg (174 lb 12.8 oz)   12/19/16 78.926 kg (174 lb)              Today, you had the following     No orders found for display       Primary Care Provider Office Phone # Fax #    Robert Sher [phone]        PHYSICIANS [address] [place] [address] [phone]  [facility] [address]        Thank you!     Thank you for [facility] FOR LUNG SCIENCE AND HEALTH  for your care. Our goal is always to provide you with excellent care. Hearing back from our patients is one way we can continue to improve our services. Please take a few minutes to complete the written survey that you may receive in the mail after your visit with us. Thank you!             Your Updated Medication List - Protect others around you: Learn how to safely use, store and throw away your medicines at www.disposemymeds.org.          This list is accurate as of: 2/3/17 10:47 AM.  Always use your most recent med list.                   Brand Name Dispense Instructions for use    CLARITIN PO      Take 10 mg by mouth as needed       fluticasone 50 MCG/ACT spray    FLONASE     Spray 1 spray into both nostrils daily       order for DME     1 each    Overnight oximetry home study       vitamin D3 2000 UNITS Caps

## 2017-02-06 ENCOUNTER — ALLIED HEALTH/NURSE VISIT (OUTPATIENT)
Dept: PULMONOLOGY | Facility: CLINIC | Age: 27
End: 2017-02-06
Attending: ALLERGY & IMMUNOLOGY
Payer: COMMERCIAL

## 2017-02-06 DIAGNOSIS — Z51.6 NEED FOR DESENSITIZATION TO ALLERGENS: Primary | ICD-10-CM

## 2017-02-06 PROCEDURE — 95117 IMMUNOTHERAPY INJECTIONS: CPT | Mod: ZF

## 2017-02-06 NOTE — NURSING NOTE
Kishor Holliday CMA at 9:16 AM on 2/6/2017   Brett Orosco comes into clinic today at the request of Robert Sher for allergy injections    No reaction to last injection.    This service provided today was under the direct supervision of Dr. Rivera, who was available if needed.    Kishor Holliday, OLIVIER      Patient came in for weekly Allergy injection. Patient had no reaction to last weeks shots. I gave 0.1 ml sub cutaneous without any issues.

## 2017-02-06 NOTE — MR AVS SNAPSHOT
After Visit Summary   2/6/2017    Brett Orosco    MRN: [id number]           Patient Information     Date Of Birth          1990        Visit Information        Provider Department      2/6/2017 9:00 AM [facility] NURSE [facility] Lung Science and Health        Today's Diagnoses     Need for desensitization to allergens    -  1        Follow-ups after your visit        Your next 10 appointments already scheduled     Feb 10, 2017  8:30 AM   Nurse Visit with [facility] NURSE   [facility] Lung Science and Health ([facility])    [address] [facility]  [address] [facility] [address] [phone]            Feb 24, 2017  8:00 AM   Return Sleep Patient with GAURAV Irene [facility] [place], Sleep Study ([facility])    [address] [place] [address] [phone]              Who to contact     If you have questions or need follow up information about today's clinic visit or your schedule please contact [facility] LUNG SCIENCE AND HEALTH directly at [phone].  Normal or non-critical lab and imaging results will be communicated to you by MashWorxhart, letter or phone within 4 business days after the clinic has received the results. If you do not hear from us within 7 days, please contact the clinic through RealtyAPXt or phone. If you have a critical or abnormal lab result, we will notify you by phone as soon as possible.  Submit refill requests through FidusNet or call your pharmacy and they will forward the refill request to us. Please allow 3 business days for your refill to be completed.          Additional Information About Your Visit        MashWorxhart Information     FidusNet gives you secure access to your electronic health record. If you see a primary care provider, you can also send messages to your care team and make appointments. If you have questions, please call your primary  care clinic.  If you do not have a primary care provider, please call [phone] and they will assist you.        Care EveryWhere ID     This is your Care EveryWhere ID. This could be used by other organizations to access your [place] medical records  [id number]         Blood Pressure from Last 3 Encounters:   01/18/17 96/61   01/10/17 103/60   01/05/17 106/65    Weight from Last 3 Encounters:   01/10/17 76.204 kg (168 lb)   01/05/17 79.289 kg (174 lb 12.8 oz)   12/19/16 78.926 kg (174 lb)              Today, you had the following     No orders found for display       Primary Care Provider Office Phone # Fax #    Robert Sher [phone]        PHYSICIANS [address] [place] [address] 521  [facility] [address]        Thank you!     Thank you for choosing [facility] FOR LUNG SCIENCE AND HEALTH  for your care. Our goal is always to provide you with excellent care. Hearing back from our patients is one way we can continue to improve our services. Please take a few minutes to complete the written survey that you may receive in the mail after your visit with us. Thank you!             Your Updated Medication List - Protect others around you: Learn how to safely use, store and throw away your medicines at www.disposemymeds.org.          This list is accurate as of: 2/6/17 10:56 AM.  Always use your most recent med list.                   Brand Name Dispense Instructions for use    CLARITIN PO      Take 10 mg by mouth as needed       fluticasone 50 MCG/ACT spray    FLONASE     Spray 1 spray into both nostrils daily       order for DME     1 each    Overnight oximetry home study       vitamin D3 2000 UNITS Caps

## 2017-02-10 ENCOUNTER — ALLIED HEALTH/NURSE VISIT (OUTPATIENT)
Dept: PULMONOLOGY | Facility: CLINIC | Age: 27
End: 2017-02-10
Attending: ALLERGY & IMMUNOLOGY
Payer: COMMERCIAL

## 2017-02-10 DIAGNOSIS — Z51.6 NEED FOR DESENSITIZATION TO ALLERGENS: Primary | ICD-10-CM

## 2017-02-10 PROCEDURE — 95117 IMMUNOTHERAPY INJECTIONS: CPT | Mod: ZF

## 2017-02-10 NOTE — NURSING NOTE
Patient came in for weekly Allergy injection. Patient had no reaction to last weeks shots. I gave 0.15 ml sub cutaneous without any issues.  Brett Orosco comes into clinic today at the request of Robert Sher for allergy injections    No reaction to last injection.    This service provided today was under the direct supervision of Dr. Rivera, who was available if needed.    Kishor Holliday, OLIVIER Holliday CMA at 9:02 AM on 2/10/2017

## 2017-02-10 NOTE — MR AVS SNAPSHOT
After Visit Summary   2/10/2017    Brett Orosco    MRN: [id number]           Patient Information     Date Of Birth          1990        Visit Information        Provider Department      2/10/2017 8:30 AM [facility] NURSE [facility] Lung Science and Health        Today's Diagnoses     Need for desensitization to allergens    -  1        Follow-ups after your visit        Your next 10 appointments already scheduled     Feb 28, 2017  8:00 AM   Return Sleep Patient with GAURAV Irene [facility] [place], Sleep Study ([facility])    [address] [place] [address] [phone]              Who to contact     If you have questions or need follow up information about today's clinic visit or your schedule please contact [facility] LUNG SCIENCE AND HEALTH directly at [phone].  Normal or non-critical lab and imaging results will be communicated to you by ABBYY Language Serviceshart, letter or phone within 4 business days after the clinic has received the results. If you do not hear from us within 7 days, please contact the clinic through ABBYY Language Serviceshart or phone. If you have a critical or abnormal lab result, we will notify you by phone as soon as possible.  Submit refill requests through Pushfor or call your pharmacy and they will forward the refill request to us. Please allow 3 business days for your refill to be completed.          Additional Information About Your Visit        MyChart Information     Pushfor gives you secure access to your electronic health record. If you see a primary care provider, you can also send messages to your care team and make appointments. If you have questions, please call your primary care clinic.  If you do not have a primary care provider, please call [phone] and they will assist you.        Care EveryWhere ID     This is your Care EveryWhere ID. This could be used by other organizations to access  your [place] medical records  [id number]         Blood Pressure from Last 3 Encounters:   01/18/17 96/61   01/10/17 103/60   01/05/17 106/65    Weight from Last 3 Encounters:   01/10/17 76.204 kg (168 lb)   01/05/17 79.289 kg (174 lb 12.8 oz)   12/19/16 78.926 kg (174 lb)              Today, you had the following     No orders found for display       Primary Care Provider Office Phone # Fax #    Robert Sher [phone]        PHYSICIANS [address] [facility] [address]  [facility] [address]        Thank you!     Thank you for choosing [facility] FOR LUNG SCIENCE AND HEALTH  for your care. Our goal is always to provide you with excellent care. Hearing back from our patients is one way we can continue to improve our services. Please take a few minutes to complete the written survey that you may receive in the mail after your visit with us. Thank you!             Your Updated Medication List - Protect others around you: Learn how to safely use, store and throw away your medicines at www.disposemymeds.org.          This list is accurate as of: 2/10/17  3:57 PM.  Always use your most recent med list.                   Brand Name Dispense Instructions for use    CLARITIN PO      Take 10 mg by mouth as needed       fluticasone 50 MCG/ACT spray    FLONASE     Spray 1 spray into both nostrils daily       order for DME     1 each    Overnight oximetry home study       vitamin D3 2000 UNITS Caps

## 2017-02-13 ENCOUNTER — ALLIED HEALTH/NURSE VISIT (OUTPATIENT)
Dept: PULMONOLOGY | Facility: CLINIC | Age: 27
End: 2017-02-13
Attending: ALLERGY & IMMUNOLOGY
Payer: COMMERCIAL

## 2017-02-13 DIAGNOSIS — Z51.6 NEED FOR DESENSITIZATION TO ALLERGENS: Primary | ICD-10-CM

## 2017-02-13 PROCEDURE — 95117 IMMUNOTHERAPY INJECTIONS: CPT | Mod: ZF

## 2017-02-13 NOTE — MR AVS SNAPSHOT
After Visit Summary   2/13/2017    Brett Orosco    MRN: [id number]           Patient Information     Date Of Birth          1990        Visit Information        Provider Department      2/13/2017 12:30 PM [facility] NURSE [facility] Lung Science and Health        Today's Diagnoses     Need for desensitization to allergens    -  1       Follow-ups after your visit        Your next 10 appointments already scheduled     Feb 13, 2017 12:30 PM CST   Nurse Visit with [facility] NURSE   [facility] Science and Health ([facility])    [address] [place] [id number] [phone]            Feb 17, 2017  9:00 AM CST   Nurse Visit with [facility] NURSE   [facility] Science and [facility] ([facility])    [address] [place] [address] [phone]            Feb 28, 2017  8:00 AM CST   Return Sleep Patient with GAURAV Irene [facility] [place], Sleep Study ([facility])    [address] [place] [phone]              Who to contact     If you have questions or need follow up information about today's clinic visit or your schedule please contact [facility] LUNG SCIENCE AND HEALTH directly at [phone].  Normal or non-critical lab and imaging results will be communicated to you by MyChart, letter or phone within 4 business days after the clinic has received the results. If you do not hear from us within 7 days, please contact the clinic through MyChart or phone. If you have a critical or abnormal lab result, we will notify you by phone as soon as possible.  Submit refill requests through Hip Innovation Technology or call your pharmacy and they will forward the refill request to us. Please allow 3 business days for your refill to be completed.          Additional Information About Your Visit         Lex[employer] Media Information     [employer] gives you secure access to your electronic health record. If you see a primary care provider, you can also send messages to your care team and make appointments. If you have questions, please call your primary care clinic.  If you do not have a primary care provider, please call [phone] and they will assist you.        Care EveryWhere ID     This is your Care EveryWhere ID. This could be used by other organizations to access your [place] medical records  [id number]         Blood Pressure from Last 3 Encounters:   01/18/17 96/61   01/10/17 103/60   01/05/17 106/65    Weight from Last 3 Encounters:   01/10/17 76.2 kg (168 lb)   01/05/17 79.3 kg (174 lb 12.8 oz)   12/19/16 78.9 kg (174 lb)              Today, you had the following     No orders found for display       Primary Care Provider Office Phone # Fax #    Robert Sher [phone]        PHYSICIANS [address] [place] [address] [address]  [facility] [address]        Thank you!     Thank you for [facility] FOR LUNG SCIENCE AND HEALTH  for your care. Our goal is always to provide you with excellent care. Hearing back from our patients is one way we can continue to improve our services. Please take a few minutes to complete the written survey that you may receive in the mail after your visit with us. Thank you!             Your Updated Medication List - Protect others around you: Learn how to safely use, store and throw away your medicines at www.disposemymeds.org.          This list is accurate as of: 2/13/17 12:23 PM.  Always use your most recent med list.                   Brand Name Dispense Instructions for use    CLARITIN PO      Take 10 mg by mouth as needed       fluticasone 50 MCG/ACT spray    FLONASE     Spray 1 spray into both nostrils daily       order for DME     1 each    Overnight oximetry home study       vitamin D3 2000 UNITS Caps

## 2017-02-13 NOTE — NURSING NOTE
Patient came in for weekly Allergy injection. Patient had no reaction to last weeks shots. I gave 0.2 ml sub cutaneous without any issues.  Kishor Holliday CMA at 9:31 AM on 2/13/2017   Brettnicki Orosco comes into clinic today at the request of Robert Sher for allergy injections    No reaction to last injection.    This service provided today was under the direct supervision of Dr. Rivera, who was available if needed.    Kishor Holliday CMA

## 2017-02-17 ENCOUNTER — ALLIED HEALTH/NURSE VISIT (OUTPATIENT)
Dept: PULMONOLOGY | Facility: CLINIC | Age: 27
End: 2017-02-17
Attending: ALLERGY & IMMUNOLOGY
Payer: COMMERCIAL

## 2017-02-17 DIAGNOSIS — Z51.6 NEED FOR DESENSITIZATION TO ALLERGENS: Primary | ICD-10-CM

## 2017-02-17 PROCEDURE — 95117 IMMUNOTHERAPY INJECTIONS: CPT | Mod: ZF

## 2017-02-17 NOTE — NURSING NOTE
Patient came in for weekly Allergy injection. Patient had no reaction to last weeks shots. I gave 0.3 ml sub cutaneous without any issues.   Brett Orosco comes into clinic today at the request of Robert Sher for allergy injections    No reaction to last injection.    This service provided today was under the direct supervision of Dr. Rivera, who was available if needed.    Kishor Holliday, OLIVIER Holliday CMA at 8:58 AM on 2/17/2017

## 2017-02-17 NOTE — MR AVS SNAPSHOT
After Visit Summary   2/17/2017    Brett Orosco    MRN: [id number]           Patient Information     Date Of Birth          1990        Visit Information        Provider Department      2/17/2017 9:00 AM [facility] NURSE [facility] Lung Science and Health        Today's Diagnoses     Need for desensitization to allergens    -  1       Follow-ups after your visit        Your next 10 appointments already scheduled     Feb 28, 2017  8:00 AM CST   Return Sleep Patient with GAURAV Irene [facility] [place], Sleep Study ([facility])    [address] [place] [address] [phone]              Who to contact     If you have questions or need follow up information about today's clinic visit or your schedule please contact [facility] LUNG SCIENCE AND HEALTH directly at [phone].  Normal or non-critical lab and imaging results will be communicated to you by GrayBughart, letter or phone within 4 business days after the clinic has received the results. If you do not hear from us within 7 days, please contact the clinic through GrayBughart or phone. If you have a critical or abnormal lab result, we will notify you by phone as soon as possible.  Submit refill requests through "[employer] or call your pharmacy and they will forward the refill request to us. Please allow 3 business days for your refill to be completed.          Additional Information About Your Visit        MyChart Information     "[employer] gives you secure access to your electronic health record. If you see a primary care provider, you can also send messages to your care team and make appointments. If you have questions, please call your primary care clinic.  If you do not have a primary care provider, please call [phone] and they will assist you.        Care EveryWhere ID     This is your Care EveryWhere ID. This could be used by other organizations to  access your [place] medical records  [id number]         Blood Pressure from Last 3 Encounters:   01/18/17 96/61   01/10/17 103/60   01/05/17 106/65    Weight from Last 3 Encounters:   01/10/17 76.2 kg (168 lb)   01/05/17 79.3 kg (174 lb 12.8 oz)   12/19/16 78.9 kg (174 lb)              Today, you had the following     No orders found for display       Primary Care Provider Office Phone # Fax #    Robert Sher [phone]        PHYSICIANS [address] [facility] [address]  [facility] [address]        Thank you!     Thank you for choosing [facility] FOR LUNG SCIENCE AND HEALTH  for your care. Our goal is always to provide you with excellent care. Hearing back from our patients is one way we can continue to improve our services. Please take a few minutes to complete the written survey that you may receive in the mail after your visit with us. Thank you!             Your Updated Medication List - Protect others around you: Learn how to safely use, store and throw away your medicines at www.disposemymeds.org.          This list is accurate as of: 2/17/17  9:17 AM.  Always use your most recent med list.                   Brand Name Dispense Instructions for use    CLARITIN PO      Take 10 mg by mouth as needed       fluticasone 50 MCG/ACT spray    FLONASE     Spray 1 spray into both nostrils daily       order for DME     1 each    Overnight oximetry home study       vitamin D3 2000 UNITS Caps

## 2017-02-24 ENCOUNTER — APPOINTMENT (OUTPATIENT)
Dept: PULMONOLOGY | Facility: CLINIC | Age: 27
End: 2017-02-24
Payer: COMMERCIAL

## 2017-02-24 ENCOUNTER — DOCUMENTATION ONLY (OUTPATIENT)
Dept: SLEEP MEDICINE | Facility: CLINIC | Age: 27
End: 2017-02-24

## 2017-02-24 NOTE — PROGRESS NOTES
All positions AARON 1.6, OAI 8.9 (supine 20.7) , AI 10.4 (supine 22.2), Hi 1.4, RDI 11.8 (supine 25.4) Time < 88% 4.0 mins, min sp02 85%

## 2017-02-27 ENCOUNTER — ALLIED HEALTH/NURSE VISIT (OUTPATIENT)
Dept: PULMONOLOGY | Facility: CLINIC | Age: 27
End: 2017-02-27
Payer: COMMERCIAL

## 2017-02-27 DIAGNOSIS — Z51.6 DESENSITIZATION TO ALLERGENS: Primary | ICD-10-CM

## 2017-02-27 PROCEDURE — 99212 OFFICE O/P EST SF 10 MIN: CPT | Mod: ZF

## 2017-02-27 PROCEDURE — 95117 IMMUNOTHERAPY INJECTIONS: CPT | Mod: ZF

## 2017-02-27 NOTE — NURSING NOTE
Chief Complaint   Patient presents with     Allergy Injection     Patient is here for prescribed allergy injections. 0.7 ml green top x 3 given. Pt instructed and agreed to wait in lobby for 30 minutes     Brett Orosco comes into clinic today at the request of Robert Sher for allergy injections    No reaction to last injection.    This service provided today was under the direct supervision of Dr. Rivera, who was available if needed.    Teresita Perez, OLIVIER Perez  CMA at 12:34 PM on 2/27/2017

## 2017-02-27 NOTE — MR AVS SNAPSHOT
After Visit Summary   2/27/2017    Brett Orosco    MRN: [id number]           Patient Information     Date Of Birth          1990        Visit Information        Provider Department      2/27/2017 9:00 AM [facility] NURSE [facility] Lung Science and Health        Today's Diagnoses     Desensitization to allergens    -  1       Follow-ups after your visit        Your next 10 appointments already scheduled     Feb 28, 2017  8:00 AM CST   Return Sleep Patient with GAURAV Irene [facility] [place], Sleep Study ([facility])    [address] [place] [address] [phone]              Who to contact     If you have questions or need follow up information about today's clinic visit or your schedule please contact [facility] LUNG SCIENCE AND HEALTH directly at [phone].  Normal or non-critical lab and imaging results will be communicated to you by GridCrafthart, letter or phone within 4 business days after the clinic has received the results. If you do not hear from us within 7 days, please contact the clinic through GridCrafthart or phone. If you have a critical or abnormal lab result, we will notify you by phone as soon as possible.  Submit refill requests through Hashbang Games or call your pharmacy and they will forward the refill request to us. Please allow 3 business days for your refill to be completed.          Additional Information About Your Visit        MyChart Information     [employer] gives you secure access to your electronic health record. If you see a primary care provider, you can also send messages to your care team and make appointments. If you have questions, please call your primary care clinic.  If you do not have a primary care provider, please call [phone] and they will assist you.        Care EveryWhere ID     This is your Care EveryWhere ID. This could be used by other organizations to access your  [place] medical records  [id number]         Blood Pressure from Last 3 Encounters:   01/18/17 96/61   01/10/17 103/60   01/05/17 106/65    Weight from Last 3 Encounters:   01/10/17 76.2 kg (168 lb)   01/05/17 79.3 kg (174 lb 12.8 oz)   12/19/16 78.9 kg (174 lb)              Today, you had the following     No orders found for display       Primary Care Provider Office Phone # Fax #    Robert Sher [phone]        PHYSICIANS [address] [facility] 741  [facility] [address]        Thank you!     Thank you for choosing [facility] LUNG SCIENCE AND HEALTH  for your care. Our goal is always to provide you with excellent care. Hearing back from our patients is one way we can continue to improve our services. Please take a few minutes to complete the written survey that you may receive in the mail after your visit with us. Thank you!             Your Updated Medication List - Protect others around you: Learn how to safely use, store and throw away your medicines at www.disposemymeds.org.          This list is accurate as of: 2/27/17 12:39 PM.  Always use your most recent med list.                   Brand Name Dispense Instructions for use    CLARITIN PO      Take 10 mg by mouth as needed       fluticasone 50 MCG/ACT spray    FLONASE     Spray 1 spray into both nostrils daily       order for DME     1 each    Overnight oximetry home study       vitamin D3 2000 UNITS Caps

## 2017-02-28 ENCOUNTER — OFFICE VISIT (OUTPATIENT)
Dept: SLEEP MEDICINE | Facility: CLINIC | Age: 27
End: 2017-02-28
Attending: NURSE PRACTITIONER
Payer: COMMERCIAL

## 2017-02-28 VITALS
SYSTOLIC BLOOD PRESSURE: 103 MMHG | DIASTOLIC BLOOD PRESSURE: 60 MMHG | HEART RATE: 70 BPM | OXYGEN SATURATION: 96 % | HEIGHT: 73 IN | RESPIRATION RATE: 16 BRPM | WEIGHT: 172 LBS | BODY MASS INDEX: 22.8 KG/M2

## 2017-02-28 DIAGNOSIS — G47.21 CIRCADIAN RHYTHM SLEEP DISORDER, DELAYED SLEEP PHASE TYPE: ICD-10-CM

## 2017-02-28 DIAGNOSIS — J30.89 ALLERGIC RHINITIS DUE TO OTHER ALLERGIC TRIGGER, UNSPECIFIED RHINITIS SEASONALITY: ICD-10-CM

## 2017-02-28 DIAGNOSIS — G47.33 OSA (OBSTRUCTIVE SLEEP APNEA): Primary | ICD-10-CM

## 2017-02-28 PROCEDURE — 99211 OFF/OP EST MAY X REQ PHY/QHP: CPT | Mod: ZF

## 2017-02-28 RX ORDER — FLUTICASONE PROPIONATE 50 MCG
2 SPRAY, SUSPENSION (ML) NASAL DAILY
Qty: 1 BOTTLE | Refills: 11 | Status: SHIPPED | OUTPATIENT
Start: 2017-02-28 | End: 2018-08-01

## 2017-02-28 NOTE — MR AVS SNAPSHOT
After Visit Summary   2/28/2017    Brett Orosco    MRN: [id number]           Patient Information     Date Of Birth          1990        Visit Information        Provider Department      2/28/2017 8:00 AM Dara Lyon APRN [facility], [place], Sleep Study        Today's Diagnoses     LILI (obstructive sleep apnea)    -  1    Allergic rhinitis due to other allergic trigger, unspecified rhinitis seasonality          Care Instructions    Overnight oximetry- i'll respond through my chart    Referral to sleep ENT for further evaluation of nose-is there anything structural standing in the way of airflow    Use:  saline spray product (ocean complete or arm and hammer are easy to use) in shower where nose naturally opens up. Use another time of day with continue congestion over the sink-Flonase cannot be used immediately after saline rinse    flonase daily as demonstrated    Insomnia - Delayed Sleep Phase  Each of us has a built in tendency to find it easier to stay up late at night or get up early in the morning.  Being a  night owl  or  early bird  is a function of our internal body clock.  When you are forced to keep a sleep schedule that goes against your typical habits (because a job or other responsibilities) insomnia can be the result.  Try the following changes to see if you can improve your sleep patterns:    Pick a sleep and wake schedule with about 7-8 hours in bed that is consistent.  That means keep the same bedtime and rise time every day of the week including the weekends, for the next 4-6 weeks    Try to get outside for about 30 minutes after you get up in the morning if the sun is out.  Sunlight is the best way to  set  your internal body clock  .  Can use 10,000 lux sad lamp for 1/2 hr    Take melatonin - 1 - 5 mg about 5 hours before bedtime --not recommended    Please keep a sleep log or diary during this time to track your sleep patterns    Positioning Device  Positioning  devices are generally used when sleep apnea is mild and only occurs on your back.This example shows a pillow that straps around the waist. It may be appropriate for those whose sleep study shows milder sleep apnea that occurs primarily when lying flat on one's back. Preliminary studies have shown benefit but effectiveness at home may need to be verified by a home sleep test. These devices are generally not covered by medical insurance.                 backpack w/ chest strap stuffed w/ pillow or t shirt 2 / pockets sew in tennis balls, go on google: positional belt for sleep apnea          Follow-ups after your visit        Additional Services     SLEEP ENT REFERRAL       [facility] Machado  [facility]  [address] [place] [address]  [address] [place] [address]  [phone]                  Follow-up notes from your care team     Return for ent  Dr. Machado, overnight ox.      Future tests that were ordered for you today     Open Future Orders        Priority Expected Expires Ordered    Overnight oximetry study Routine  8/27/2017 2/28/2017            Who to contact     If you have questions or need follow up information about today's clinic visit or your schedule [facility] directly at [phone].  Normal or non-critical lab and imaging results will be communicated to you by [employer], letter or phone within 4 business days after the clinic has received the results. If you do not hear from us within 7 days, please contact the clinic through [employer] or phone. If you have a critical or abnormal lab result, we will notify you by phone as soon as possible.  Submit refill requests through [employer] or call your pharmacy and they will forward the refill request to us. Please allow 3 business days for your refill to be completed.          Additional Information About Your Visit        [employer] Information     [employer] gives you secure access to your electronic health record. If you see a primary  "care provider, you can also send messages to your care team and make appointments. If you have questions, please call your primary care clinic.  If you do not have a primary care provider, please call [phone] and they will assist you.        Care EveryWhere ID     This is your Care EveryWhere ID. This could be used by other organizations to access your [facility] medical records  [id number]        Your Vitals Were     Pulse Respirations Height Pulse Oximetry BMI (Body Mass Index)       70 16 1.854 m (6' 1\") 96% 22.69 kg/m2        Blood Pressure from Last 3 Encounters:   02/28/17 103/60   01/18/17 96/61   01/10/17 103/60    Weight from Last 3 Encounters:   02/28/17 78 kg (172 lb)   01/10/17 76.2 kg (168 lb)   01/05/17 79.3 kg (174 lb 12.8 oz)              We Performed the Following     SLEEP ENT REFERRAL          Today's Medication Changes          These changes are accurate as of: 2/28/17  3:48 PM.  If you have any questions, ask your nurse or doctor.               These medicines have changed or have updated prescriptions.        Dose/Directions    fluticasone 50 MCG/ACT spray   Commonly known as:  FLONASE   Indication:  Allergic Rhinitis   This may have changed:  how much to take   Changed by:  Dara Lyon APRN CNP        Dose:  2 spray   Spray 2 sprays into both nostrils daily   Quantity:  1 Bottle   Refills:  11            Where to get your medicines      These medications were sent to [facility] [place], [place] - [address]  [address], [facility] [address]    Hours:  TRANSPLANT PHONE NUMBER [phone] Phone:  [phone]     fluticasone 50 MCG/ACT spray                Primary Care Provider Office Phone # Fax #    Robert Sher [phone]        [facility] [address] [facility] [address]  [facility] [address]        Thank you!     Thank you for choosing [facility], SLEEP STUDY  for your care. Our goal is always " to provide you with excellent care. Hearing back from our patients is one way we can continue to improve our services. Please take a few minutes to complete the written survey that you may receive in the mail after your visit with us. Thank you!             Your Updated Medication List - Protect others around you: Learn how to safely use, store and throw away your medicines at www.disposemymeds.org.          This list is accurate as of: 2/28/17  3:48 PM.  Always use your most recent med list.                   Brand Name Dispense Instructions for use    CLARITIN PO      Take 10 mg by mouth as needed       fluticasone 50 MCG/ACT spray    FLONASE    1 Bottle    Spray 2 sprays into both nostrils daily       vitamin D3 2000 UNITS Caps

## 2017-02-28 NOTE — PATIENT INSTRUCTIONS
Overnight oximetry- i'll respond through my chart    Referral to sleep ENT for further evaluation of nose-is there anything structural standing in the way of airflow    Use:  saline spray product (ocean complete or arm and hammer are easy to use) in shower where nose naturally opens up. Use another time of day with continue congestion over the sink-Flonase cannot be used immediately after saline rinse    flonase daily as demonstrated    Insomnia - Delayed Sleep Phase  Each of us has a built in tendency to find it easier to stay up late at night or get up early in the morning.  Being a  night owl  or  early bird  is a function of our internal body clock.  When you are forced to keep a sleep schedule that goes against your typical habits (because a job or other responsibilities) insomnia can be the result.  Try the following changes to see if you can improve your sleep patterns:    Pick a sleep and wake schedule with about 7-8 hours in bed that is consistent.  That means keep the same bedtime and rise time every day of the week including the weekends, for the next 4-6 weeks    Try to get outside for about 30 minutes after you get up in the morning if the sun is out.  Sunlight is the best way to  set  your internal body clock  .  Can use 10,000 lux sad lamp for 1/2 hr    Take melatonin - 1 - 5 mg about 5 hours before bedtime --not recommended    Please keep a sleep log or diary during this time to track your sleep patterns    Positioning Device  Positioning devices are generally used when sleep apnea is mild and only occurs on your back.This example shows a pillow that straps around the waist. It may be appropriate for those whose sleep study shows milder sleep apnea that occurs primarily when lying flat on one's back. Preliminary studies have shown benefit but effectiveness at home may need to be verified by a home sleep test. These devices are generally not covered by medical insurance.                 backpack w/  chest strap stuffed w/ pillow or t shirt 2 / pockets sew in tennis balls, go on google: positional belt for sleep apnea

## 2017-02-28 NOTE — NURSING NOTE
"Chief Complaint   Patient presents with     RECHECK     Follow up to discuss sleep diaries       Initial Ht 1.854 m (6' 1\")  Wt 78 kg (172 lb)  BMI 22.69 kg/m2 Estimated body mass index is 22.69 kg/(m^2) as calculated from the following:    Height as of this encounter: 1.854 m (6' 1\").    Weight as of this encounter: 78 kg (172 lb).  Medication Reconciliation: complete     DOMINGO Ellis        "

## 2017-02-28 NOTE — PROGRESS NOTES
DATE OF SERVICE:  02/28/2017.      LOCATION:  [facility].      CHIEF COMPLAINT:  Mr. Brett Orosco returns to the clinic today to discuss his concerns with regard to untreated sleep apnea, chronic rhinitis with difficulty breathing both daytime and night and probable significant allergic component and digital clubbing.      HISTORY OF PRESENT ILLNESS:  Mr. 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  When seen last he was instructed on phase advancement of his sleep schedule.  He has complied on and off with using bright light therapy and with trying to move his weekend bedtime up a little sooner.  What he has noticed is decreased in length of time to initiate sleep and feeling as if his sleep is a bit more restorative.  He is investigated looking for a positional device but has not selected one at this time.  He has started seeing an allergist and the allergist has told him it will take 2-3 years for him to see a significant response to his injections.  He has returned to using Flonase and a Breathe Right strip and feels like he is breathing a little bit better.  However, he still feels significantly blocked with nasal airflow.  Currently likely has some symptoms of an upper respiratory infection.  All in all, reports that his daytime sleepiness might be a little bit better with an Leola sleepiness score of 10 and wished to proceed with further fine tuning of our plan of care.  Also, we did attempt an overnight oximetry to validate the results of his study in [place], which he reports was a poor quality study due to difficulty with sleep continuity.  However, that piece of equipment did not seem to work very well  at this time.      With regard to his allergies, he seems to feel that there is some response to his injections and regular use of Flonase and Breathe Right, so I have reordered his Flonase and will ask for his primary Dr. Sher to renew his antihistamine.    With regard to hypoxemia and whether that is playing a small but perhaps some part in his digital clubbing he has been told that this is likely a genetic tendency and with his background in research, he knows that usually other associations do come along with digital clubbing.  If further evaluation is needed to see if he has some other underlying syndrome that might also be associated with digital clubbing, I do defer to his primary care.  At this point in time I do not see any overt issues, but I am looking at it from a pulmonary and sleep disorder breathing viewpoint.  With regard to shortness of breath during the day, he does indicate that upon exposure to certain things such as unknown plants in a park or just getting outside and perhaps exercising he will have some significant dyspneic reactions to unknown exposures since he is uncertain what he is allergic to.  He does have a previous pulmonary function tests which he reports shows normal ventilation pattern.  I am wondering if he could have some intermittent asthma with his profound allergic rhinitis and multiple allergies that we do know that he is experiencing and I will defer further clinical diagnosis to Dr. Sher.      Allergies:    Allergies   Allergen Reactions     Cats      Seasonal Allergies Other (See Comments)       Medications:    Current Outpatient Prescriptions   Medication Sig Dispense Refill     Cholecalciferol (VITAMIN D3) 2000 UNITS CAPS        Loratadine (CLARITIN PO) Take 10 mg by mouth as needed       fluticasone (FLONASE) 50 MCG/ACT spray Spray 1 spray into both nostrils daily         Problem List:  Patient Active Problem List    Diagnosis Date Noted     Decreased cardiac  "ejection fraction 12/15/2016     Priority: Medium     50-55%       Seasonal allergic rhinitis 2016     Priority: Medium     LILI (obstructive sleep apnea) 2016     Priority: Medium     Clubbing of nails 2016     Priority: Medium     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         [place] Total Score 2017   Total score - C[place] 10       GENERAL APPEARANCE: alert, mild distress and ill today    Sleep schedule:    Bedtime:    Week: 10:30 enter, fall by 11P, enter 12:00 wkend, sleep 12:30-1A occasion check phone                              Sleep Latency: 30-1hr  Final Wakeups: week: 7-7:30 A, bright light on occasion; weekend 9-10A with light  Return to sleep: better   wakeups: minimal  Clock: to check time    Naps: no    pcp- nasal tx , and allergist (shots 2x/wk)-results in 2-3 years.    Sleep study-mild disease, worse supine without hypoxemia:     Test to validate -overnight oximetry, did not record much of night    follow up with multiple mask nose too clogged, ff -couldn't tolerate the leaks, unwilling to retrial cpap    Does not need weightloss    Positional device-could not find anything on amazon                       ASSESSMENT AND PLAN:  It is my impression that Mr. Orosco has multiple issues both with breathing during the day and during the nighttime and the following plan of care has been " developed:   1.  Sleep disordered breathing, mild, possibly being treated positionally.  He has not identified a positional device to purchase at this time.  I will redirect him towards a website where he can look for possible options.  We will work on further treatment of his rhinitis.  I did send a referral to our sleep ENT to evaluate if there could be any nasal obstruction.  I will communicate with him with the results of an overnight oximetry, sleeping in all positions to equate that to the results that were found on his sleep study done in [place] which did not show hypoxemia.  If hypoxemia is present, we may need to be more aggressive in either positional device or some measure to further treat his mild sleep disordered breathing.   2.  Rhinitis.  nasal saline spray recommended  and Flonase has been reordered for him.   3. Delayed circadian rhythm disorder: regularity of am rise time and light exposure reexplained    Concerns about digital clubbing (overnight oximetry pending) should be addressed with his primary care as well as whether he has a reactive airway disease concern.        Time spent with patient was 40 minutes, of which greater than 50% was spent in counseling, education and coordination of care.         GAURAV NY, CNP             D: 2017 10:27   T: 2017 11:24   MT: SK      Name:     KEVIN COBOS   MRN:      [id number]        Account:      [id number]   :      1990           Visit Date:   2017      Document: [id number]

## 2017-02-28 NOTE — PROGRESS NOTES
"    Allergies:    Allergies   Allergen Reactions     Cats      Seasonal Allergies Other (See Comments)       Medications:    Current Outpatient Prescriptions   Medication Sig Dispense Refill     Cholecalciferol (VITAMIN D3) 2000 UNITS CAPS        Loratadine (CLARITIN PO) Take 10 mg by mouth as needed       fluticasone (FLONASE) 50 MCG/ACT spray Spray 1 spray into both nostrils daily         Problem List:  Patient Active Problem List    Diagnosis Date Noted     Decreased cardiac ejection fraction 12/15/2016     Priority: Medium     50-55%       Seasonal allergic rhinitis 12/05/2016     Priority: Medium     LILI (obstructive sleep apnea) 12/05/2016     Priority: Medium     Clubbing of nails 12/05/2016     Priority: Medium     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) 12/05/2016     Priority: Medium        Past Medical/Surgical History:  Past Medical History   Diagnosis Date     Clubbing of nail      LILI (obstructive sleep apnea)      Seasonal allergies      requires immunotherapy     Past Surgical History   Procedure Laterality Date     Tonsillectomy & adenoidectomy  1993     Left arm fracture       open repair           Physical Examination:  Vitals: /60 (BP Location: Right arm, Patient Position: Supine, Cuff Size: Adult Regular)  Pulse 70  Resp 16  Ht 1.854 m (6' 1\")  Wt 78 kg (172 lb)  SpO2 96%  BMI 22.69 kg/m2  BMI= Body mass index is 22.69 kg/(m^2).         [place] Total Score 2/28/2017   Total score - [place] 10       GENERAL APPEARANCE: alert, mild distress and ill today    Sleep schedule:    Bedtime:    Week: 10:30 enter, fall by 11P, enter 12:00 wkend, sleep 12:30-1A occasion check phone                              Sleep Latency: 30-1hr  Final Wakeups: week: 7-7:30 A, bright light on occasion; weekend 9-10A with light  Return to sleep: better   wakeups: minimal  Clock: to check time    Naps: no    pcp-no nasal tx, and allergist (shots 2x/wk)-results in 2-3 years.    Sleep study-mild disease, worse supine " without hypoxemia:     Test to validate -overnight oximetry, did not record much of night    follow up with multiple mask nose too clogged, ff -couldn't tolerate the leaks, unwilling to retrial cpap    Does not need weightloss    Positional device-could not find anything on amazon                         Looking for way to breathe better, with nasal meds, sleeps better, but with unknown contacts- can't breathe

## 2017-03-03 ENCOUNTER — ALLIED HEALTH/NURSE VISIT (OUTPATIENT)
Dept: PULMONOLOGY | Facility: CLINIC | Age: 27
End: 2017-03-03
Attending: ALLERGY & IMMUNOLOGY
Payer: COMMERCIAL

## 2017-03-03 DIAGNOSIS — Z51.6 NEED FOR DESENSITIZATION TO ALLERGENS: Primary | ICD-10-CM

## 2017-03-03 PROCEDURE — 95117 IMMUNOTHERAPY INJECTIONS: CPT | Mod: ZF

## 2017-03-03 NOTE — MR AVS SNAPSHOT
After Visit Summary   3/3/2017    Brett Orosco    MRN: [id number]           Patient Information     Date Of Birth          1990        Visit Information        Provider Department      3/3/2017 9:30 AM [facility] NURSE [facility]        Today's Diagnoses     Need for desensitization to allergens    -  1       Follow-ups after your visit        Your next 10 appointments already scheduled     Mar 06, 2017  9:00 AM CST   Nurse Visit with [facility] NURSE   [facility] ([facility])    [address] [place] [address] [id number] [phone]            Mar 10, 2017  9:00 AM CST   Nurse Visit with [facility] NURSE   [facility] ([facility])    [address] [place] [address] [id number] [phone]            Mar 20, 2017  9:00 AM CDT   Nurse Visit with [facility] NURSE   [facility] ([facility])    [address] [place] [id number] [phone]              Who to contact     If you have questions or need follow up information about today's clinic visit or your schedule please contact [facility] HE[facility] directly at [phone].  Normal or non-critical lab and imaging results will be communicated to you by MyChart, letter or phone within 4 business days after the clinic has received the results. If you do not hear from us within 7 days, please contact the clinic through MyChart or phone. If you have a critical or abnormal lab result, we will notify you by phone as soon as possible.  Submit refill requests through [employer] or call your pharmacy and they will forward the refill request to us. Please allow 3 business days for your refill to be completed.          Additional Information About Your Visit        MyChart Information      [employer] gives you secure access to your electronic health record. If you see a primary care provider, you can also send messages to your care team and make appointments. If you have questions, please call your primary care clinic.  If you do not have a primary care provider, please call [phone] and they will assist you.        Care EveryWhere ID     This is your Care EveryWhere ID. This could be used by other organizations to access your [facility] medical records  [id number]         Blood Pressure from Last 3 Encounters:   02/28/17 103/60   01/18/17 96/61   01/10/17 103/60    Weight from Last 3 Encounters:   02/28/17 78 kg (172 lb)   01/10/17 76.2 kg (168 lb)   01/05/17 79.3 kg (174 lb 12.8 oz)              Today, you had the following     No orders found for display       Primary Care Provider Office Phone # Fax #    Robert Sher [phone]        [facility] [address] [place] [address] [address]  [facility] [address]        Thank you!     Thank you for choosing [facility]  for your care. Our goal is always to provide you with excellent care. Hearing back from our patients is one way we can continue to improve our services. Please take a few minutes to complete the written survey that you may receive in the mail after your visit with us. Thank you!             Your Updated Medication List - Protect others around you: Learn how to safely use, store and throw away your medicines at www.disposemymeds.org.          This list is accurate as of: 3/3/17 10:49 AM.  Always use your most recent med list.                   Brand Name Dispense Instructions for use    CLARITIN PO      Take 10 mg by mouth as needed       fluticasone 50 MCG/ACT spray    FLONASE    1 Bottle    Spray 2 sprays into both nostrils daily       vitamin D3 2000 UNITS Caps

## 2017-03-03 NOTE — NURSING NOTE
Patient came in for weekly Allergy injection. Patient had no reaction to last weeks shots. I gave 0.1 ml sub cutaneous without any issues.     Brett Orosco comes into clinic today at the request of Robert Sher for allergy injections    No reaction to last injection.    This service provided today was under the direct supervision of Dr. Rivera, who was available if needed.    Kishor Holliday, OLIVIER Holliday CMA at 10:24 AM on 3/3/2017

## 2017-03-06 ENCOUNTER — ALLIED HEALTH/NURSE VISIT (OUTPATIENT)
Dept: PULMONOLOGY | Facility: CLINIC | Age: 27
End: 2017-03-06
Attending: ALLERGY & IMMUNOLOGY
Payer: COMMERCIAL

## 2017-03-06 DIAGNOSIS — Z51.6 NEED FOR DESENSITIZATION TO ALLERGENS: Primary | ICD-10-CM

## 2017-03-06 PROCEDURE — 95117 IMMUNOTHERAPY INJECTIONS: CPT | Mod: ZF

## 2017-03-06 NOTE — NURSING NOTE
Patient came in for weekly Allergy injection. Patient had no reaction to last weeks shots. I gave 0.15 ml sub cutaneous without any issues.   Brett Orosco comes into clinic today at the request of Robert Sher for allergy injections    No reaction to last injection.    This service provided today was under the direct supervision of Dr. Rivera, who was available if needed.    Kishor Holliday, OLIVIER Holliday CMA at 9:36 AM on 3/6/2017

## 2017-03-06 NOTE — MR AVS SNAPSHOT
After Visit Summary   3/6/2017    Brett Orosco    MRN: [id number]           Patient Information     Date Of Birth          1990        Visit Information        Provider Department      3/6/2017 9:00 AM [facility] NURSE [facility]        Today's Diagnoses     Need for desensitization to allergens    -  1       Follow-ups after your visit        Your next 10 appointments already scheduled     Mar 10, 2017  9:00 AM CST   Nurse Visit with [facility] NURSE   [facility] ([facility])    [address] [place] [address] [phone]            Mar 20, 2017  9:00 AM CDT   Nurse Visit with [facility] NURSE   [facility] ([facility])    [address] [place] [address] [phone]              Who to contact     If you have questions or need follow up information about today's clinic visit or your schedule please contact [facility] LUNG SCIENCE AND HEALTH directly at [phone].  Normal or non-critical lab and imaging results will be communicated to you by Armasighthart, letter or phone within 4 business days after the clinic has received the results. If you do not hear from us within 7 days, please contact the clinic through [employer] or phone. If you have a critical or abnormal lab result, we will notify you by phone as soon as possible.  Submit refill requests through [employer] or call your pharmacy and they will forward the refill request to us. Please allow 3 business days for your refill to be completed.          Additional Information About Your Visit        Armasighthart Information     [employer] gives you secure access to your electronic health record. If you see a primary care provider, you can also send messages to your care team and make appointments. If you have questions, please call your primary care  clinic.  If you do not have a primary care provider, please call [phone] and they will assist you.        Care EveryWhere ID     This is your Care EveryWhere ID. This could be used by other organizations to access your [place] medical records  [id number]         Blood Pressure from Last 3 Encounters:   02/28/17 103/60   01/18/17 96/61   01/10/17 103/60    Weight from Last 3 Encounters:   02/28/17 78 kg (172 lb)   01/10/17 76.2 kg (168 lb)   01/05/17 79.3 kg (174 lb 12.8 oz)              Today, you had the following     No orders found for display       Primary Care Provider Office Phone # Fax #    Robert Sher [phone]        PHYSICIANS [address] [place] [address]  [facility] [address]        Thank you!     Thank you for choosing [facility] FOR LUNG SCIENCE AND HEALTH  for your care. Our goal is always to provide you with excellent care. Hearing back from our patients is one way we can continue to improve our services. Please take a few minutes to complete the written survey that you may receive in the mail after your visit with us. Thank you!             Your Updated Medication List - Protect others around you: Learn how to safely use, store and throw away your medicines at www.disposemymeds.org.          This list is accurate as of: 3/6/17 10:19 AM.  Always use your most recent med list.                   Brand Name Dispense Instructions for use    CLARITIN PO      Take 10 mg by mouth as needed       fluticasone 50 MCG/ACT spray    FLONASE    1 Bottle    Spray 2 sprays into both nostrils daily       vitamin D3 2000 UNITS Caps

## 2017-03-10 ENCOUNTER — ALLIED HEALTH/NURSE VISIT (OUTPATIENT)
Dept: PULMONOLOGY | Facility: CLINIC | Age: 27
End: 2017-03-10
Attending: ALLERGY & IMMUNOLOGY
Payer: COMMERCIAL

## 2017-03-10 DIAGNOSIS — Z51.6 NEED FOR DESENSITIZATION TO ALLERGENS: Primary | ICD-10-CM

## 2017-03-10 PROCEDURE — 95117 IMMUNOTHERAPY INJECTIONS: CPT | Mod: ZF

## 2017-03-10 NOTE — NURSING NOTE
Chief Complaint   Patient presents with     Allergy Injection     Patient is here for prescribed allergy injection. 0.2 ml x 3 blue top given. Pt instructed to wait in lobby for 30 minutes      Teresita Perez CMA at 4:39 PM on 3/10/2017  Brett Orosco comes into clinic today at the request of LOU Rivera for allergy injections    No reaction to last injection.  is service provided today was under the direct supervision of Dr. Lemus, who was available if needed.    Teresita Perez, CMA

## 2017-03-20 ENCOUNTER — ALLIED HEALTH/NURSE VISIT (OUTPATIENT)
Dept: PULMONOLOGY | Facility: CLINIC | Age: 27
End: 2017-03-20
Attending: ALLERGY & IMMUNOLOGY
Payer: COMMERCIAL

## 2017-03-20 DIAGNOSIS — Z51.6 NEED FOR DESENSITIZATION TO ALLERGENS: Primary | ICD-10-CM

## 2017-03-20 PROCEDURE — 95117 IMMUNOTHERAPY INJECTIONS: CPT | Mod: ZF

## 2017-03-20 NOTE — MR AVS SNAPSHOT
After Visit Summary   3/20/2017    Brett Orosco    MRN: [id number]           Patient Information     Date Of Birth          1990        Visit Information        Provider Department      3/20/2017 9:00 AM [facility] NURSE [facility] Lung Science and Health        Today's Diagnoses     Need for desensitization to allergens    -  1       Follow-ups after your visit        Your next 10 appointments already scheduled     Mar 20, 2017  9:00 AM CDT   Nurse Visit with [facility] NURSE   [facility] ([facility])    [address]  3rd Floor  [facility] [id number] [phone]            Apr 05, 2017  5:30 PM CDT   New Visit with Whitney Alexis [facility] ([facility])    [facility]  [address]  [facility] [address] [phone]              Who to contact     If you have questions or need follow up information about today's clinic visit or your schedule please contact [facility] LUNG SCIENCE AND HEALTH directly at [phone].  Normal or non-critical lab and imaging results will be communicated to you by Symtexthart, letter or phone within 4 business days after the clinic has received the results. If you do not hear from us within 7 days, please contact the clinic through [employer] or phone. If you have a critical or abnormal lab result, we will notify you by phone as soon as possible.  Submit refill requests through [employer] or call your pharmacy and they will forward the refill request to us. Please allow 3 business days for your refill to be completed.          Additional Information About Your Visit        Symtexthart Information     [employer] gives you secure access to your electronic health record. If you see a primary care provider, you can also send messages to your care team and make appointments. If you have questions, please call your primary care clinic.   If you do not have a primary care provider, please call [phone] and they will assist you.        Care EveryWhere ID     This is your Care EveryWhere ID. This could be used by other organizations to access your [place] medical records  [id number]         Blood Pressure from Last 3 Encounters:   02/28/17 103/60   01/18/17 96/61   01/10/17 103/60    Weight from Last 3 Encounters:   02/28/17 78 kg (172 lb)   01/10/17 76.2 kg (168 lb)   01/05/17 79.3 kg (174 lb 12.8 oz)              Today, you had the following     No orders found for display       Primary Care Provider Office Phone # Fax #    Robert Sher [phone]        PHYSICIANS [address] [place] [address] [address]  [facility] [address]        Thank you!     Thank you for choosing [facility] FOR LUNG SCIENCE AND HEALTH  for your care. Our goal is always to provide you with excellent care. Hearing back from our patients is one way we can continue to improve our services. Please take a few minutes to complete the written survey that you may receive in the mail after your visit with us. Thank you!             Your Updated Medication List - Protect others around you: Learn how to safely use, store and throw away your medicines at www.disposemymeds.org.          This list is accurate as of: 3/20/17  8:57 AM.  Always use your most recent med list.                   Brand Name Dispense Instructions for use    CLARITIN PO      Take 10 mg by mouth as needed       fluticasone 50 MCG/ACT spray    FLONASE    1 Bottle    Spray 2 sprays into both nostrils daily       vitamin D3 2000 UNITS Caps

## 2017-03-20 NOTE — NURSING NOTE
Patient came in for weekly Allergy injection. Patient had no reaction to last weeks shots. I gave 0.3 ml sub cutaneous without any issues.   Brett Orosco comes into clinic today at the request of Robert Sher for allergy injections    No reaction to last injection.    This service provided today was under the direct supervision of Dr. Rivera, who was available if needed.    Kishor Holliday, OLIVIER Holliday CMA at 8:52 AM on 3/20/2017

## 2017-03-24 ENCOUNTER — ALLIED HEALTH/NURSE VISIT (OUTPATIENT)
Dept: PULMONOLOGY | Facility: CLINIC | Age: 27
End: 2017-03-24
Attending: ALLERGY & IMMUNOLOGY
Payer: COMMERCIAL

## 2017-03-24 DIAGNOSIS — Z51.6 NEED FOR DESENSITIZATION TO ALLERGENS: Primary | ICD-10-CM

## 2017-03-24 PROCEDURE — 95117 IMMUNOTHERAPY INJECTIONS: CPT | Mod: ZF

## 2017-03-24 NOTE — NURSING NOTE
Chief Complaint   Patient presents with     Allergy Injection     Patient is here for prescribed allergy injection. 0.4 ml blue top x 3 given. Pt made appt for follow up with Miguel for concerns of possible ASthma Wants PFT      Teresita Perez  CMA at 12:52 PM on 3/24/2017  Brett Orosco comes into clinic today at the request of Robert Sher for allergy injections    No reaction to last injection.    This service provided today was under the direct supervision of Dr. Sevilla, who was available if needed.    Teresita Perez, CMA

## 2017-03-24 NOTE — MR AVS SNAPSHOT
After Visit Summary   3/24/2017    Brett Orosco    MRN: [id number]           Patient Information     Date Of Birth          1990        Visit Information        Provider Department      3/24/2017 11:00 AM [facility] NURSE [facility] Lung Science and Health         Follow-ups after your visit        Your next 10 appointments already scheduled     Apr 05, 2017  5:30 PM CDT   New Visit with Whitney Alexis [facility] ([facility])    [facility]  [address]  [facility] [id number] [phone]            Apr 10, 2017  9:55 AM CDT   (Arrive by 9:40 AM)   Return Allergy with Charlie Rivera MD   [facility] Lung Science and Health ([facility])    [address]  3rd [facility] [address] [phone]           Do not take anti-histamines or Zantac for seven day prior to your appointment.              Who to contact     If you have questions or need follow up information about today's clinic visit or your schedule please contact [facility] LUNG SCIENCE AND HEALTH directly at [phone].  Normal or non-critical lab and imaging results will be communicated to you by Siemenshart, letter or phone within 4 business days after the clinic has received the results. If you do not hear from us within 7 days, please contact the clinic through Siemenshart or phone. If you have a critical or abnormal lab result, we will notify you by phone as soon as possible.  Submit refill requests through [employer] or call your pharmacy and they will forward the refill request to us. Please allow 3 business days for your refill to be completed.          Additional Information About Your Visit        MyChart Information     [employer] gives you secure access to your electronic health record. If you see a primary care provider, you can also send messages to your care team and make appointments. If  you have questions, please call your primary care clinic.  If you do not have a primary care provider, please call [phone] and they will assist you.        Care EveryWhere ID     This is your Care EveryWhere ID. This could be used by other organizations to access your [place] medical records  [id number]         Blood Pressure from Last 3 Encounters:   02/28/17 103/60   01/18/17 96/61   01/10/17 103/60    Weight from Last 3 Encounters:   02/28/17 78 kg (172 lb)   01/10/17 76.2 kg (168 lb)   01/05/17 79.3 kg (174 lb 12.8 oz)              Today, you had the following     No orders found for display       Primary Care Provider Office Phone # Fax #    Robert Sher [phone]        PHYSICI[facility] [address] [facility] [address]  [facility] [address]        Thank you!     Thank you for choosing [facility] FOR LUNG SCIENCE AND HEALTH  for your care. Our goal is always to provide you with excellent care. Hearing back from our patients is one way we can continue to improve our services. Please take a few minutes to complete the written survey that you may receive in the mail after your visit with us. Thank you!             Your Updated Medication List - Protect others around you: Learn how to safely use, store and throw away your medicines at www.disposemymeds.org.          This list is accurate as of: 3/24/17 12:47 PM.  Always use your most recent med list.                   Brand Name Dispense Instructions for use    CLARITIN PO      Take 10 mg by mouth as needed       fluticasone 50 MCG/ACT spray    FLONASE    1 Bottle    Spray 2 sprays into both nostrils daily       vitamin D3 2000 UNITS Caps

## 2017-03-27 ENCOUNTER — ALLIED HEALTH/NURSE VISIT (OUTPATIENT)
Dept: PULMONOLOGY | Facility: CLINIC | Age: 27
End: 2017-03-27
Attending: ALLERGY & IMMUNOLOGY
Payer: COMMERCIAL

## 2017-03-27 DIAGNOSIS — Z51.6 NEED FOR DESENSITIZATION TO ALLERGENS: Primary | ICD-10-CM

## 2017-03-27 PROCEDURE — 95117 IMMUNOTHERAPY INJECTIONS: CPT | Mod: ZF

## 2017-03-27 NOTE — MR AVS SNAPSHOT
After Visit Summary   3/27/2017    Brett Orosco    MRN: [id number]           Patient Information     Date Of Birth          1990        Visit Information        Provider Department      3/27/2017 9:00 AM [facility] NURSE [facility] Lung Science and Health        Today's Diagnoses     Need for desensitization to allergens    -  1       Follow-ups after your visit        Your next 10 appointments already scheduled     Mar 31, 2017  9:00 AM CDT   Nurse Visit with [facility] NURSE   [facility] Lung Science and Health ([facility])    [address] [address]   [phone]            Apr 03, 2017  9:00 AM CDT   Nurse Visit with [facility] NURSE   [facility] and Health ([facility])    [address] [address]   [phone]            Apr 05, 2017  5:30 PM CDT   New Visit with Whitney Alexis [facility] ([facility])    [facility]  [address]  [facility] [address]   [phone]            Apr 07, 2017  9:00 AM CDT   Nurse Visit with [facility] NURSE   [facility] Science and Health ([facility])    [address] [address]   [phone]            Apr 10, 2017  9:00 AM CDT   Nurse Visit with [facility] NURSE   [facility] and Health ([facility])    [address] [address]   [phone]            Apr 10, 2017  9:55 AM CDT   (Arrive by 9:40 AM)   Return Allergy with [facility] L[facility]ERN [facility] Lung Science and Health ([facility])    [address] [address] [phone]           Do not take anti-histamines or Zantac for seven day prior to your appointment.             Apr 14, 2017  9:00 AM CDT   Nurse Visit with [facility] NURSE   [facility] Lung Science and Health ([facility])    [address] [address] [phone]            Apr 17, 2017  9:00 AM CDT   Nurse Visit with [facility] NURSE   [facility] Lung Science and Health ([facility])    [address] [address] [phone]              Who to contact     If you have questions or need follow up information about today's clinic visit or your schedule please contact [facility] LUNG SCIENCE AND HEALTH directly at [phone].  Normal or non-critical lab and imaging results will be communicated to you by O4IThart, letter or phone within 4 business days after the clinic has received the results. If you do not hear from us within 7 days, please contact the clinic through SprinkleBitt or phone. If you have a critical or abnormal lab result, we will notify you by phone as soon as possible.  Submit refill requests through [employer] or call your pharmacy and they will forward the refill request to us. Please allow 3 business days for your refill to be completed.          Additional Information About Your Visit        [employer] Information     [employer] gives you secure access to your electronic health record. If you see a primary care provider, you can also send messages to your care team and make appointments. If you have questions, please call your primary care clinic.  If you do not have a primary care provider, please call [phone] and they will assist you.        Care EveryWhere ID     This is your Care EveryWhere ID. This could be used by other organizations to access your [place] medical records  [id number]         Blood Pressure from Last 3 Encounters:   02/28/17 103/60   01/18/17 96/61   01/10/17 103/60    Weight from Last 3 Encounters:   02/28/17 78 kg (172 lb)   01/10/17 76.2 kg  (168 lb)   01/05/17 79.3 kg (174 lb 12.8 oz)              Today, you had the following     No orders found for display       Primary Care Provider Office Phone # Fax #    Robert Sher [phone]        PHYSICIANS [address] [facility] [address]  [facility] [address]        Thank you!     Thank you for choosing [facility] FOR LUNG SCIENCE AND HEALTH  for your care. Our goal is always to provide you with excellent care. Hearing back from our patients is one way we can continue to improve our services. Please take a few minutes to complete the written survey that you may receive in the mail after your visit with us. Thank you!             Your Updated Medication List - Protect others around you: Learn how to safely use, store and throw away your medicines at www.disposemymeds.org.          This list is accurate as of: 3/27/17  4:32 PM.  Always use your most recent med list.                   Brand Name Dispense Instructions for use    CLARITIN PO      Take 10 mg by mouth as needed       fluticasone 50 MCG/ACT spray    FLONASE    1 Bottle    Spray 2 sprays into both nostrils daily       vitamin D3 2000 UNITS Caps

## 2017-03-27 NOTE — NURSING NOTE
Chief Complaint   Patient presents with     Allergy Injection     Patient is here for prescribed allergy injection. Pt given 0.5 ml blue top serum x 3. Pt waited in lobby for 30 minutes.       Brett Orosco comes into clinic today at the request of Robert Sher for allergy injections    No reaction to last injection.    This service provided today was under the direct supervision of Dr. Rivera, who was available if needed.    Teresita Perez, OLIVIER Perez  CMA at 12:04 PM on 3/27/2017

## 2017-03-31 ENCOUNTER — ALLIED HEALTH/NURSE VISIT (OUTPATIENT)
Dept: PULMONOLOGY | Facility: CLINIC | Age: 27
End: 2017-03-31
Attending: ALLERGY & IMMUNOLOGY
Payer: COMMERCIAL

## 2017-03-31 DIAGNOSIS — Z51.6 NEED FOR DESENSITIZATION TO ALLERGENS: Primary | ICD-10-CM

## 2017-03-31 PROCEDURE — 95117 IMMUNOTHERAPY INJECTIONS: CPT | Mod: ZF

## 2017-03-31 NOTE — NURSING NOTE
Patient came in for weekly Allergy injection. Patient had no reaction to last weeks shots. I gave 0.1 ml sub cutaneous without any issues.   Brett Orosco comes into clinic today at the request of Robert Sher for allergy injections    No reaction to last injection.    This service provided today was under the direct supervision of Dr. Rivera, who was available if needed.    Kishor Holliday, OLIVIER Holliday CMA at 10:43 AM on 3/31/2017

## 2017-03-31 NOTE — MR AVS SNAPSHOT
After Visit Summary   3/31/2017    Brett Orosco    MRN: [id number]           Patient Information     Date Of Birth          1990        Visit Information        Provider Department      3/31/2017 9:00 AM [facility] NURSE [facility] Science [facility]        Today's Diagnoses     Need for desensitization to allergens    -  1       Follow-ups after your visit        Your next 10 appointments already scheduled     Apr 03, 2017  9:00 AM CDT   Nurse Visit with [facility] NURSE   [facility] ([facility] [place])    [address] [place] [address]   [phone]            Apr 05, 2017  5:30 PM CDT   New Visit with Whitney Alexis [facility] ([facility])    [place] [facility]  [address]  [facility] [address]   [phone]            Apr 07, 2017  9:00 AM CDT   Nurse Visit with [facility] NURSE   [facility] ([facility] [place])    [address] [place] [address]   [phone]            Apr 10, 2017  9:00 AM CDT   Nurse Visit with [facility] NURSE   [facility] ([facility] [place])    [address] [place] [address]   [phone]            Apr 10, 2017  9:55 AM CDT   (Arrive by 9:40 AM)   Return Allergy with Charlie Rivera MD   [facility] ([facility] [place])    [address] [place] [address]   [phone]           Do not take anti-histamines or Zantac for seven day prior to your appointment.            Apr 14, 2017  9:00 AM CDT   Nurse Visit with [facility] NURSE   [facility] ([facility])    [address] [place] [address]   [phone]             Apr 17, 2017  9:00 AM CDT   Nurse Visit with [facility] NURSE   [facility] ([facility])    [address] [facility] [address]  [address] [facility] [address] [phone]              Who to contact     If you have questions or need follow up information about today's clinic visit or your schedule please contact [facility] LUNG SCIENCE AND HEALTH directly at [phone].  Normal or non-critical lab and imaging results will be communicated to you by Connectloudhart, letter or phone within 4 business days after the clinic has received the results. If you do not hear from us within 7 days, please contact the clinic through CiteeCart or phone. If you have a critical or abnormal lab result, we will notify you by phone as soon as possible.  Submit refill requests through LocalCustomer or call your pharmacy and they will forward the refill request to us. Please allow 3 business days for your refill to be completed.          Additional Information About Your Visit        LocalCustomer Information     LocalCustomer gives you secure access to your electronic health record. If you see a primary care provider, you can also send messages to your care team and make appointments. If you have questions, please call your primary care clinic.  If you do not have a primary care provider, please call [phone] and they will assist you.        Care EveryWhere ID     This is your Care EveryWhere ID. This could be used by other organizations to access your [place] medical records  [id number]         Blood Pressure from Last 3 Encounters:   02/28/17 103/60   01/18/17 96/61   01/10/17 103/60    Weight from Last 3 Encounters:   02/28/17 78 kg (172 lb)   01/10/17 76.2 kg (168 lb)   01/05/17 79.3 kg (174 lb 12.8 oz)              Today, you had the following     No orders found for display       Primary Care Provider Office Phone # Fax #    Robert Sher [phone]         [facility] [address] [facility] [address]  [facility] [address]        Thank you!     Thank you for choosing [facility] FOR LUNG SCIENCE AND HEALTH  for your care. Our goal is always to provide you with excellent care. Hearing back from our patients is one way we can continue to improve our services. Please take a few minutes to complete the written survey that you may receive in the mail after your visit with us. Thank you!             Your Updated Medication List - Protect others around you: Learn how to safely use, store and throw away your medicines at www.disposemymeds.org.          This list is accurate as of: 3/31/17 11:34 AM.  Always use your most recent med list.                   Brand Name Dispense Instructions for use    CLARITIN PO      Take 10 mg by mouth as needed       fluticasone 50 MCG/ACT spray    FLONASE    1 Bottle    Spray 2 sprays into both nostrils daily       vitamin D3 2000 UNITS Caps

## 2017-04-03 ENCOUNTER — ALLIED HEALTH/NURSE VISIT (OUTPATIENT)
Dept: PULMONOLOGY | Facility: CLINIC | Age: 27
End: 2017-04-03
Attending: ALLERGY & IMMUNOLOGY
Payer: COMMERCIAL

## 2017-04-03 DIAGNOSIS — Z51.6 NEED FOR DESENSITIZATION TO ALLERGENS: Primary | ICD-10-CM

## 2017-04-03 PROCEDURE — 95117 IMMUNOTHERAPY INJECTIONS: CPT | Mod: ZF

## 2017-04-03 NOTE — NURSING NOTE
Patient came in for weekly Allergy injection. Patient had no reaction to last weeks shots. I gave 0.8 ml sub cutaneous without any issues.   Brett Orosco comes into clinic today at the request of Robert Sher for allergy injections    No reaction to last injection.    This service provided today was under the direct supervision of Dr. Rivera, who was available if needed.    Kishor Holliday, OLIVIER Holldiay CMA at 9:27 AM on 4/3/2017

## 2017-04-03 NOTE — MR AVS SNAPSHOT
After Visit Summary   4/3/2017    Brett Orosco    MRN: [id number]           Patient Information     Date Of Birth          1990        Visit Information        Provider Department      4/3/2017 9:00 AM [facility] NURSE [facility] Science [facility]        Today's Diagnoses     Need for desensitization to allergens    -  1       Follow-ups after your visit        Your next 10 appointments already scheduled     Apr 05, 2017  5:30 PM CDT   New Visit with Whitney Alexis [facility] ([facility])    [place] [facility]  [address]  [facility] [address]   [phone]            Apr 07, 2017  9:00 AM CDT   Nurse Visit with [facility] NURSE   [facility] ([facility] [place])    [address] [place] [address]   [phone]            Apr 10, 2017  9:00 AM CDT   Nurse Visit with [facility] NURSE   [facility] ([facility] [place])    [address] [place] [address]   [phone]            Apr 10, 2017  9:55 AM CDT   (Arrive by 9:40 AM)   Return Allergy with Charlie Rivera MD   [facility] ([facility] [place])    [address] [place] [address] [phone]           Do not take anti-histamines or Zantac for seven day prior to your appointment.            Apr 14, 2017  9:00 AM CDT   Nurse Visit with [facility] NURSE   [facility] ([facility] [place])    [address] [place] [address]   [phone]            Apr 17, 2017  9:00 AM CDT   Nurse Visit with [facility] NURSE   [facility] ([facility] [place])    [address] [place] [address]   [phone]               Who to contact     If you have questions or need follow up information about today's clinic visit or your schedule please contact [facility] L[facility]G SCIENCE AND HEALTH directly at [phone].  Normal or non-critical lab and imaging results will be communicated to you by MyChart, letter or phone within 4 business days after the clinic has received the results. If you do not hear from us within 7 days, please contact the clinic through MyChart or phone. If you have a critical or abnormal lab result, we will notify you by phone as soon as possible.  Submit refill requests through CrowdFlik or call your pharmacy and they will forward the refill request to us. Please allow 3 business days for your refill to be completed.          Additional Information About Your Visit        CrowdFlik Information     CrowdFlik gives you secure access to your electronic health record. If you see a primary care provider, you can also send messages to your care team and make appointments. If you have questions, please call your primary care clinic.  If you do not have a primary care provider, please call [phone] and they will assist you.        Care EveryWhere ID     This is your Care EveryWhere ID. This could be used by other organizations to access your [facility] medical records  [id number]         Blood Pressure from Last 3 Encounters:   02/28/17 103/60   01/18/17 96/61   01/10/17 103/60    Weight from Last 3 Encounters:   02/28/17 78 kg (172 lb)   01/10/17 76.2 kg (168 lb)   01/05/17 79.3 kg (174 lb 12.8 oz)              Today, you had the following     No orders found for display       Primary Care Provider Office Phone # Fax #    Robert Sher [phone]        [facility] [address] [facility] [address]  [facility] [address]        Thank you!     Thank you for choosing [facility] LUNG SCIENCE AND HEALTH  for your care. Our goal is always to provide you with excellent care. Hearing back from  our patients is one way we can continue to improve our services. Please take a few minutes to complete the written survey that you may receive in the mail after your visit with us. Thank you!             Your Updated Medication List - Protect others around you: Learn how to safely use, store and throw away your medicines at www.disposemymeds.org.          This list is accurate as of: 4/3/17  9:36 AM.  Always use your most recent med list.                   Brand Name Dispense Instructions for use    CLARITIN PO      Take 10 mg by mouth as needed       fluticasone 50 MCG/ACT spray    FLONASE    1 Bottle    Spray 2 sprays into both nostrils daily       vitamin D3 2000 UNITS Caps

## 2017-04-05 ENCOUNTER — OFFICE VISIT (OUTPATIENT)
Dept: PSYCHOLOGY | Facility: CLINIC | Age: 27
End: 2017-04-05
Payer: COMMERCIAL

## 2017-04-05 DIAGNOSIS — F41.9 ANXIETY: Primary | ICD-10-CM

## 2017-04-05 DIAGNOSIS — F32.A DEPRESSION: ICD-10-CM

## 2017-04-05 DIAGNOSIS — F12.21 CANNABIS USE DISORDER, SEVERE, IN EARLY REMISSION (H): ICD-10-CM

## 2017-04-05 PROCEDURE — 90832 PSYTX W PT 30 MINUTES: CPT | Performed by: COUNSELOR

## 2017-04-05 ASSESSMENT — PATIENT HEALTH QUESTIONNAIRE - PHQ9: 5. POOR APPETITE OR OVEREATING: SEVERAL DAYS

## 2017-04-05 ASSESSMENT — ANXIETY QUESTIONNAIRES
6. BECOMING EASILY ANNOYED OR IRRITABLE: SEVERAL DAYS
2. NOT BEING ABLE TO STOP OR CONTROL WORRYING: SEVERAL DAYS
1. FEELING NERVOUS, ANXIOUS, OR ON EDGE: MORE THAN HALF THE DAYS
5. BEING SO RESTLESS THAT IT IS HARD TO SIT STILL: NOT AT ALL
GAD7 TOTAL SCORE: 6
3. WORRYING TOO MUCH ABOUT DIFFERENT THINGS: SEVERAL DAYS
7. FEELING AFRAID AS IF SOMETHING AWFUL MIGHT HAPPEN: NOT AT ALL
IF YOU CHECKED OFF ANY PROBLEMS ON THIS QUESTIONNAIRE, HOW DIFFICULT HAVE THESE PROBLEMS MADE IT FOR YOU TO DO YOUR WORK, TAKE CARE OF THINGS AT HOME, OR GET ALONG WITH OTHER PEOPLE: SOMEWHAT DIFFICULT

## 2017-04-05 NOTE — PROGRESS NOTES
Progress Note - Initial Session    Client Name:  Brett Orosco Date: 4/5/2017         Service Type: Individual      Session Start Time: 6:00p  Session End Time: 6:30p      Session Length: 16 - 37      Session #: 1     Attendees: Client attended alone         Diagnostic Assessment in progress.  Unable to complete documentation at the conclusion of the first session due to assessing and addressing low scores on PHQ 9 and ALTA 7 and ongoing interpersonal issues.       Mental Status Assessment:  Appearance:   Appropriate   Eye Contact:   Fair   Psychomotor Behavior: Normal   Attitude:   Cooperative   Orientation:   All  Speech   Rate / Production: Slow    Volume:  Normal   Mood:    Normal  Affect:    Appropriate  Restricted   Thought Content:  Clear   Thought Form:  Coherent  Goal Directed  Logical   Insight:    Fair       Safety Issues and Plan for Safety and Risk Management:  Client denies current fears or concerns for personal safety.  Client denies current or recent suicidal ideation or behaviors.  Client denies current or recent homicidal ideation or behaviors.  Client denies current or recent self injurious behavior or ideation.  Client denies other safety concerns.  A safety and risk management plan has not been developed at this time, however client was given the after-hours number / 911 should there be a change in any of these risk factors.  Client reports there are no firearms in the house.      Diagnostic Criteria:   - Excessive anxiety and worry about a number of events or activities (such as work or school performance).    - The person finds it difficult to control the worry.   - Being easily fatigued.    - Difficulty concentrating or mind going blank.    - Irritability.    - Sleep disturbance (difficulty falling or staying asleep, or restless unsatisfying sleep).    - Depressed mood. Note: In children and adolescents, can be irritable mood.     - Poor sleep.    - Fatigue or loss of  energy.    - Feelings of worthlessness or excessive guilt.    - Diminished ability to think or concentrate, or indecisiveness.       DSM5 Diagnoses: (Sustained by DSM5 Criteria Listed Above)  Diagnoses: PROVISIONAL 300.00 (F41.9) Unspecified Anxiety Disorder; 311 Other/unspec. 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  Psychosocial & Contextual Factors: Interpersonal issues with girlfriend, severe allergies and sleep issues  WHODAS 2.0 (12 item)            This questionnaire asks about difficulties due to health conditions. Health conditions  include  disease or illnesses, other health problems that may be short or long lasting,  injuries, mental health or emotional problems, and problems with alcohol or drugs.                     Think back over the past 30 days and answer these questions, thinking about how much  difficulty you had doing the following activities. For each question, please [place] only  one response.    S1 Standing for long periods such as 30 minutes? None =         1   S2 Taking care of household responsibilities? Mild =           2   S3 Learning a new task, for example, learning how to get to a new place? None =         1   S4 How much of a problem do you have joining community activities (for example, festivals, Yarsani or other activities) in the same way as anyone else can? None =         1   S5 How much have you been emotionally affected by your health problems? Mild =           2     In the past 30 days, how much difficulty did you have in:   S6 Concentrating on doing something for ten minutes? None =         1   S7 Walking a long distance such as a kilometer (or equivalent)? None =         1   S8 Washing your whole body? None =         1   S9 Getting dressed? None =         1   S10 Dealing with people you do not know? None =         1   S11 Maintaining a friendship? None =         1   S12 Your day to day work? None =         1     H1 Overall, in the past 30 days,  how many days were these difficulties present? Record number of days 0   H2 In the past 30 days, for how many days were you totally unable to carry out your usual activities or work because of any health condition? Record number of days  0   H3 In the past 30 days, not counting the days that you were totally unable, for how many days did you cut back or reduce your usual activities or work because of any health condition? Record number of days 0       Collateral Reports Completed:  Routed note to PCP      PLAN: (Homework, other):  Client stated that he may follow up for ongoing services with [facility].  Continue to assess and address mental health symptoms, chemical health issues, interpersonal problems with girlfriend, complete DA, and start treatment plan.      [facility]

## 2017-04-05 NOTE — MR AVS SNAPSHOT
MRN:[id number]                      After Visit Summary   4/5/2017    Brett Orosco    MRN: [id number]           Visit Information        Provider Department      4/5/2017 5:30 PM Whitney Alexis [facility]      Your next 10 appointments already scheduled     Apr 07, 2017  9:00 AM CDT   Nurse Visit with [facility] NURSE   [facility] ([email or web address] [place])    [address] [place] [address]   [phone]            Apr 10, 2017  9:00 AM CDT   Nurse Visit with [facility] NURSE   [facility] ([email or web address] [place])    [address] [place] [address]   [phone]            Apr 10, 2017  9:55 AM CDT   (Arrive by 9:40 AM)   Return Allergy with Charlie Rivera MD   [facility] ([email or web address] [place])    [address] [place] [address] [phone]           Do not take anti-histamines or Zantac for seven day prior to your appointment.            Apr 14, 2017  9:00 AM CDT   Nurse Visit with [facility] NURSE   [facility] Lung Science and [facility] ([facility])    [address] [place] [address]   [phone]            Apr 17, 2017  9:00 AM CDT   Nurse Visit with [facility] NURSE   [facility] ([email or web address] [place])    [address] [place] [address]   [phone]            Apr 26, 2017  5:30 PM CDT   Return Visit with Whitney Alexis [facility] ([facility])    [place] [facility]  [address]  [facility] [address] [phone]              MyChart Information     MyChart gives you secure access to your electronic health record. If you see a primary  care provider, you can also send messages to your care team and make appointments. If you have questions, please call your primary care clinic.  If you do not have a primary care provider, please call [phone] and they will assist you.        Care EveryWhere ID     This is your Care EveryWhere ID. This could be used by other organizations to access your [place] medical records  [id number]

## 2017-04-05 NOTE — Clinical Note
Hi Dr. Sher, Patient self-referred to start therapy services. He completed first intake appointment and currently meets criteria for 300.00 (F41.9) Unspecified Anxiety Disorder, 311 Other/unspec. Depressive Disorder, and Cannabis Use Disorder Severe in Early Remission. Please contact me with any questions or concerns.  Thank you, Whitney Alexis MA, [facility]

## 2017-04-06 ASSESSMENT — ANXIETY QUESTIONNAIRES: GAD7 TOTAL SCORE: 6

## 2017-04-06 ASSESSMENT — PATIENT HEALTH QUESTIONNAIRE - PHQ9: SUM OF ALL RESPONSES TO PHQ QUESTIONS 1-9: 8

## 2017-04-07 ENCOUNTER — ALLIED HEALTH/NURSE VISIT (OUTPATIENT)
Dept: PULMONOLOGY | Facility: CLINIC | Age: 27
End: 2017-04-07
Attending: ALLERGY & IMMUNOLOGY
Payer: COMMERCIAL

## 2017-04-07 DIAGNOSIS — Z51.6 NEED FOR DESENSITIZATION TO ALLERGENS: Primary | ICD-10-CM

## 2017-04-07 PROCEDURE — 95117 IMMUNOTHERAPY INJECTIONS: CPT | Mod: ZF

## 2017-04-07 NOTE — NURSING NOTE
Chief Complaint   Patient presents with     Allergy Injection     Patient is being seen for prescribed allergy injections.0.1 yellow top x3 given. Pt instructed/agreed to wait in lobby for 30 minutes      Teresita Perez CMA at 9:01 AM on 4/7/2017  Brett Orosco comes into clinic today at the request of Robert Sher for allergy injections    No reaction to last injection.    This service provided today was under the direct supervision of Dr. Alcantar, who was available if needed.    Teresita Perez CMA

## 2017-04-07 NOTE — MR AVS SNAPSHOT
After Visit Summary   4/7/2017    Brett Orosco    MRN: [id number]           Patient Information     Date Of Birth          1990        Visit Information        Provider Department      4/7/2017 9:00 AM [facility] NURSE [facility] Lung Science and Health        Today's Diagnoses     Need for desensitization to allergens    -  1       Follow-ups after your visit        Your next 10 appointments already scheduled     Apr 10, 2017  9:00 AM CDT   Nurse Visit with [facility] NURSE   [facility] Lung Science and [facility] ([facility] [place])    [address] [place] [address] [phone]            Apr 10, 2017  9:55 AM CDT   (Arrive by 9:40 AM)   Return Allergy with Charlie Rivera MD   [facility] Lung Science and Health ([facility])    [address] [place] [address] [phone]           Do not take anti-histamines or Zantac for seven day prior to your appointment.            Apr 14, 2017  9:00 AM CDT   Nurse Visit with [facility] NURSE   [facility] Lung Science and Health ([facility])    [address] [place] [address] [phone]            Apr 17, 2017  9:00 AM CDT   Nurse Visit with [facility] NURSE   [facility] Lung Science and Health ([facility])    [address] [place] [id number] [phone]            Apr 26, 2017  5:30 PM CDT   [facility]   [facility] ([facility]  [address]  [facility] [address] [phone]              Who to contact     If you have questions or need follow up information about today's clinic visit or your schedule please contact [facility] LUNG SCIENCE AND HEALTH directly at [phone].  Normal or  non-critical lab and imaging results will be communicated to you by MyChart, letter or phone within 4 business days after the clinic has received the results. If you do not hear from us within 7 days, please contact the clinic through Cardeas Pharmat or phone. If you have a critical or abnormal lab result, we will notify you by phone as soon as possible.  Submit refill requests through StyleSeek or call your pharmacy and they will forward the refill request to us. Please allow 3 business days for your refill to be completed.          Additional Information About Your Visit        StyleSeek Information     StyleSeek gives you secure access to your electronic health record. If you see a primary care provider, you can also send messages to your care team and make appointments. If you have questions, please call your primary care clinic.  If you do not have a primary care provider, please call [phone] and they will assist you.        Care EveryWhere ID     This is your Care EveryWhere ID. This could be used by other organizations to access your [place] medical records  [id number]         Blood Pressure from Last 3 Encounters:   02/28/17 103/60   01/18/17 96/61   01/10/17 103/60    Weight from Last 3 Encounters:   02/28/17 78 kg (172 lb)   01/10/17 76.2 kg (168 lb)   01/05/17 79.3 kg (174 lb 12.8 oz)              Today, you had the following     No orders found for display       Primary Care Provider Office Phone # Fax #    Robert Sher [phone]        PHYSICIANS [address] [place] [address] [address]  [facility] [address]        Thank you!     Thank you for choosing [facility] FOR LUNG SCIENCE AND HEALTH  for your care. Our goal is always to provide you with excellent care. Hearing back from our patients is one way we can continue to improve our services. Please take a few minutes to complete the written survey that you may receive in the mail after your visit with us. Thank you!             Your Updated  Medication List - Protect others around you: Learn how to safely use, store and throw away your medicines at www.disposemymeds.org.          This list is accurate as of: 4/7/17 10:39 AM.  Always use your most recent med list.                   Brand Name Dispense Instructions for use    CLARITIN PO      Take 10 mg by mouth as needed       fluticasone 50 MCG/ACT spray    FLONASE    1 Bottle    Spray 2 sprays into both nostrils daily       vitamin D3 2000 UNITS Caps

## 2017-04-10 ENCOUNTER — OFFICE VISIT (OUTPATIENT)
Dept: PULMONOLOGY | Facility: CLINIC | Age: 27
End: 2017-04-10
Attending: ALLERGY & IMMUNOLOGY
Payer: COMMERCIAL

## 2017-04-10 VITALS
BODY MASS INDEX: 22.53 KG/M2 | OXYGEN SATURATION: 95 % | WEIGHT: 170 LBS | HEART RATE: 66 BPM | RESPIRATION RATE: 18 BRPM | SYSTOLIC BLOOD PRESSURE: 104 MMHG | DIASTOLIC BLOOD PRESSURE: 67 MMHG | HEIGHT: 73 IN | TEMPERATURE: 98.2 F

## 2017-04-10 DIAGNOSIS — J30.89 SEASONAL ALLERGIC RHINITIS DUE TO OTHER ALLERGIC TRIGGER: Primary | ICD-10-CM

## 2017-04-10 DIAGNOSIS — J30.1 SEASONAL ALLERGIC RHINITIS DUE TO POLLEN: Primary | ICD-10-CM

## 2017-04-10 DIAGNOSIS — R06.02 SHORTNESS OF BREATH: ICD-10-CM

## 2017-04-10 DIAGNOSIS — Z51.6 DESENSITIZATION TO ALLERGENS: ICD-10-CM

## 2017-04-10 PROCEDURE — 99212 OFFICE O/P EST SF 10 MIN: CPT | Mod: ZF

## 2017-04-10 PROCEDURE — 95117 IMMUNOTHERAPY INJECTIONS: CPT | Mod: ZF

## 2017-04-10 RX ORDER — AZELASTINE 1 MG/ML
1-2 SPRAY, METERED NASAL 2 TIMES DAILY
Qty: 1 BOTTLE | Refills: 3 | Status: SHIPPED | OUTPATIENT
Start: 2017-04-10 | End: 2018-12-17

## 2017-04-10 RX ORDER — MONTELUKAST SODIUM 10 MG/1
10 TABLET ORAL EVERY EVENING
Qty: 30 TABLET | Refills: 2 | Status: SHIPPED | OUTPATIENT
Start: 2017-04-10 | End: 2017-11-15

## 2017-04-10 ASSESSMENT — PAIN SCALES - GENERAL: PAINLEVEL: NO PAIN (0)

## 2017-04-10 NOTE — LETTER
4/10/2017       RE: Brett Orosco  [address]  [facility] [address]     Dear Colleague,    Thank you for referring your patient, Brett Orosco, to the [facility] at [facility]. Please see a copy of my visit note below.    Reason for Visit  Brett Orosco is a 27 year old male who is here for follow-up for congestion.    Allergy HPI  The patient presents for followup.  He was on allergy shots, slow sensitive build, started at [facility].  He is currently on yellow 0.15.  He is not having any reactions to the shots.  He still has a lot of nasal congestion.  He feels like his nose is always stuffed.  He is using Flonase 2 sprays each nostril daily as well as Claritin.  He is also concerned that he may have asthma as he feels like he cannot take a full breath and he has a lot of trouble sleeping at night.  He did a sleep study and they did not say it was sleep apnea but did give him a sleep apnea machine that he does not like.  Interestingly, he says that when he was in college to get extra money he was involved in some studies and 1 of them was a methacholine challenge for which he thought he would test negative; however, they said you may have asthma based on these results but he never further evaluated that.         The patient was seen and examined by Charlie Boggs MD   Current Outpatient Prescriptions   Medication     azelastine (ASTELIN) 0.1 % spray     fluticasone (FLONASE) 50 MCG/ACT spray     Cholecalciferol (VITAMIN D3) 2000 UNITS CAPS     Loratadine (CLARITIN PO)     No current facility-administered medications for this visit.      Allergies   Allergen Reactions     Cats      Seasonal Allergies Other (See Comments)     Social History     Social History     Marital status: Single     Spouse name: N/A     Number of children: N/A     Years of education: N/A     Occupational History      "Not on file.     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  /67  Pulse 66  Temp 98.2  F (36.8  C) (Oral)  Resp 18  Ht 1.854 m (6' 1\")  Wt 77.1 kg (170 lb)  SpO2 95%  BMI 22.43 kg/m2  Exam:   GENERAL APPEARANCE: Well developed, well nourished, alert, and in no apparent distress.  EYES: EOMI, conjunctiva clear non-injected  HENT: Nasal mucosa with no edema and no discharge. No nasal polyps.    EARS: Canals clear, TMs normal.  MOUTH: Oral mucosa is moist, without any lesions, no tonsillar enlargement, no oropharyngeal exudate.  RESP: Good air flow throughout.  No crackles. No rhonchi. No wheezes.  CV: Normal S1, S2, regular rhythm, normal rate. No murmur.  No rub. No gallop. No LE edema.   MS: Extremities normal. No clubbing. No cyanosis.  SKIN: No rashes noted.  NEURO: Speech normal, normal strength and tone, normal gait and stance  PSYCH: Normal mentation, orientation to person, place, and time.  Results:      Assessment and plan: Brett presents today for followup.  He was on allergy shots starting at the [facility] on a sensitive schedule.  He will continue on this as he has not had any reactions.  We also " have add azelastine nasal sprays as he still has significant congestion to go along with the fluticasone nasal spray to take 1 spray of each twice a day.  He also has concern of shortness of breath and therefore spirometry was done today.       Spirometry with FEv1 97% and 10% Change and FEF 25-75: 65% with 32% change  We will start singulair, risks of medicine explained.          Again, thank you for allowing me to participate in the care of your patient.      Sincerely,    Charlie Boggs MD

## 2017-04-10 NOTE — PATIENT INSTRUCTIONS
Take Fluticassone (flonase) and AZelastine 1 spray in each nostril of each twice a day, if often forgetting twice a day can take 2 sprays of each once a day.    Cetirizine (zyrtec) 10 mg once a day.

## 2017-04-10 NOTE — MR AVS SNAPSHOT
After Visit Summary   4/10/2017    Brett Orosco    MRN: [id number]           Patient Information     Date Of Birth          1990        Visit Information        Provider Department      4/10/2017 9:55 AM Charlie Rivera MD [facility]        Today's Diagnoses     Seasonal allergic rhinitis due to pollen    -  1    Desensitization to allergens        Shortness of breath          Care Instructions    Take Fluticassone (flonase) and AZelastine 1 spray in each nostril of each twice a day, if often forgetting twice a day can take 2 sprays of each once a day.    Cetirizine (zyrtec) 10 mg once a day.        Follow-ups after your visit        Follow-up notes from your care team     Return in about 3 months (around 7/10/2017).      Your next 10 appointments already scheduled     Apr 14, 2017  9:00 AM CDT   [facility]   [facility] ([facility])    [address] [place] [address] [id number] [phone]            Apr 17, 2017  9:00 AM CDT   [facility] Visit with [facility] NURSE   [facility] ([facility])    [address] [place] [address] [id number] [phone]            Apr 26, 2017  5:30 PM CDT   Return Visit with Whitney Alexis [facility] ([facility])    [place] [facility]  [address]  [facility] [id number] [phone]              Who to contact     If you have questions or need follow up information about today's clinic visit or your schedule please contact [facility] LUNG SCIENCE AND HEALTH directly at [phone].  Normal or non-critical lab and imaging results will be communicated to you by MyChart, letter or phone within 4 business days after the clinic has received the results. If you do not hear from us within 7 days,  "please contact the clinic through Mpayy or phone. If you have a critical or abnormal lab result, we will notify you by phone as soon as possible.  Submit refill requests through Mpayy or call your pharmacy and they will forward the refill request to us. Please allow 3 business days for your refill to be completed.          Additional Information About Your Visit        Meeting To YouharCoupmon Information     Mpayy gives you secure access to your electronic health record. If you see a primary care provider, you can also send messages to your care team and make appointments. If you have questions, please call your primary care clinic.  If you do not have a primary care provider, please call [phone] and they will assist you.        Care EveryWhere ID     This is your Care EveryWhere ID. This could be used by other organizations to access your [place] medical records  [id number]        Your Vitals Were     Pulse Temperature Respirations Height Pulse Oximetry BMI (Body Mass Index)    66 98.2  F (36.8  C) (Oral) 18 1.854 m (6' 1\") 95% 22.43 kg/m2       Blood Pressure from Last 3 Encounters:   04/10/17 104/67   02/28/17 103/60   01/18/17 96/61    Weight from Last 3 Encounters:   04/10/17 77.1 kg (170 lb)   02/28/17 78 kg (172 lb)   01/10/17 76.2 kg (168 lb)              Today, you had the following     No orders found for display         Today's Medication Changes          These changes are accurate as of: 4/10/17  2:02 PM.  If you have any questions, ask your nurse or doctor.               Start taking these medicines.        Dose/Directions    azelastine 0.1 % spray   Commonly known as:  ASTELIN   Used for:  Seasonal allergic rhinitis due to pollen   Started by:  Charlie Rivera MD        Dose:  1-2 spray   Spray 1-2 sprays into both nostrils 2 times daily   Quantity:  1 Bottle   Refills:  3       montelukast 10 MG tablet   Commonly known as:  SINGULAIR   Used for:  Seasonal allergic rhinitis due to pollen "   Started by:  Charlie Rivera MD        Dose:  10 mg   Take 1 tablet (10 mg) by mouth every evening   Quantity:  30 tablet   Refills:  2            Where to get your medicines      These medications were sent to [facility] [place], [place] - [address]  [address], [facility] [address]    Hours:  TRANSPLANT PHONE NUMBER [phone] Phone:  [phone]     azelastine 0.1 % spray    montelukast 10 MG tablet                Primary Care Provider Office Phone # Fax #    Robert Sher [phone]        [facility] [address] [facility] [address]  [facility] [address]        Thank you!     Thank you for choosing [facility] LUNG SCIENCE AND HEALTH  for your care. Our goal is always to provide you with excellent care. Hearing back from our patients is one way we can continue to improve our services. Please take a few minutes to complete the written survey that you may receive in the mail after your visit with us. Thank you!             Your Updated Medication List - Protect others around you: Learn how to safely use, store and throw away your medicines at www.disposemymeds.org.          This list is accurate as of: 4/10/17  2:02 PM.  Always use your most recent med list.                   Brand Name Dispense Instructions for use    azelastine 0.1 % spray    ASTELIN    1 Bottle    Spray 1-2 sprays into both nostrils 2 times daily       CLARITIN PO      Take 10 mg by mouth as needed       fluticasone 50 MCG/ACT spray    FLONASE    1 Bottle    Spray 2 sprays into both nostrils daily       montelukast 10 MG tablet    SINGULAIR    30 tablet    Take 1 tablet (10 mg) by mouth every evening       vitamin D3 2000 UNITS Caps

## 2017-04-10 NOTE — NURSING NOTE
Chief Complaint   Patient presents with     RECHECK     Follow up on Brett and his allergy injections     Kishor Holliday CMA at 9:39 AM on 4/10/2017

## 2017-04-10 NOTE — PROGRESS NOTES
Reason for Visit  Brett Orosco is a 27 year old male who is here for follow-up for congestion.    Allergy HPI  The patient presents for followup.  He was on allergy shots, slow sensitive build, started at [facility].  He is currently on yellow 0.15.  He is not having any reactions to the shots.  He still has a lot of nasal congestion.  He feels like his nose is always stuffed.  He is using Flonase 2 sprays each nostril daily as well as Claritin.  He is also concerned that he may have asthma as he feels like he cannot take a full breath and he has a lot of trouble sleeping at night.  He did a sleep study and they did not say it was sleep apnea but did give him a sleep apnea machine that he does not like.  Interestingly, he says that when he was in college to get extra money he was involved in some studies and 1 of them was a methacholine challenge for which he thought he would test negative; however, they said you may have asthma based on these results but he never further evaluated that.         The patient was seen and examined by Charlie Boggs MD   Current Outpatient Prescriptions   Medication     azelastine (ASTELIN) 0.1 % spray     fluticasone (FLONASE) 50 MCG/ACT spray     Cholecalciferol (VITAMIN D3) 2000 UNITS CAPS     Loratadine (CLARITIN PO)     No current facility-administered medications for this visit.      Allergies   Allergen Reactions     Cats      Seasonal Allergies Other (See Comments)     Social History     Social History     Marital status: Single     Spouse name: N/A     Number of children: N/A     Years of education: N/A     Occupational History     Not on file.     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  /67  Pulse 66  Temp 98.2  F (36.8  C) (Oral)  Resp 18  Ht 1.854 m (6' 1\")  Wt 77.1 kg (170 lb)  SpO2 95%  BMI 22.43 kg/m2  Exam:   GENERAL APPEARANCE: Well developed, well nourished, alert, and in no apparent distress.  EYES: EOMI, conjunctiva clear non-injected  HENT: Nasal mucosa with no edema and no discharge. No nasal polyps.    EARS: Canals clear, TMs normal.  MOUTH: Oral mucosa is moist, without any lesions, no tonsillar enlargement, no oropharyngeal exudate.  RESP: Good air flow throughout.  No crackles. No rhonchi. No wheezes.  CV: Normal S1, S2, regular rhythm, normal rate. No murmur.  No rub. No gallop. No LE edema.   MS: Extremities normal. No clubbing. No cyanosis.  SKIN: No rashes noted.  NEURO: Speech normal, normal strength and tone, normal gait and stance  PSYCH: Normal mentation, orientation to person, place, and time.  Results:      Assessment and plan: Brett presents today for followup.  He was on allergy shots starting at the [facility] on a sensitive schedule.  He will continue on this as he has not had any reactions.  We also have add azelastine nasal sprays as he still has significant congestion to go along with the fluticasone nasal spray to take 1 spray of each twice a day.  He also has concern of shortness of breath and therefore spirometry was done today.       Spirometry with FEv1 97% and 10% Change and FEF 25-75: 65% with 32% change  We " will start singulair, risks of medicine explained.

## 2017-04-14 ENCOUNTER — ALLIED HEALTH/NURSE VISIT (OUTPATIENT)
Dept: PULMONOLOGY | Facility: CLINIC | Age: 27
End: 2017-04-14
Attending: ALLERGY & IMMUNOLOGY
Payer: COMMERCIAL

## 2017-04-14 DIAGNOSIS — Z51.6 NEED FOR DESENSITIZATION TO ALLERGENS: Primary | ICD-10-CM

## 2017-04-14 PROCEDURE — 95117 IMMUNOTHERAPY INJECTIONS: CPT | Mod: ZF

## 2017-04-14 NOTE — NURSING NOTE
Chief Complaint   Patient presents with     Allergy Injection     Patient is here for allergy injections. Pt given 0.2 ml blue top x 3. Pt waited 30 minutes in cindy Perez CMA at 8:55 AM on 4/14/2017

## 2017-04-14 NOTE — MR AVS SNAPSHOT
After Visit Summary   4/14/2017    Brett Orosco    MRN: [id number]           Patient Information     Date Of Birth          1990        Visit Information        Provider Department      4/14/2017 9:00 AM [facility] NURSE [facility]         Follow-ups after your visit        Your next 10 appointments already scheduled     Apr 17, 2017  9:00 AM CDT   Nurse Visit with [facility] NURSE   [facility] ([facility])    [address]  3rd Floor  [facility] [id number] [phone]            Apr 26, 2017  5:30 PM CDT   Return Visit with Whitney Alexis [facility] ([facility])    [place] [facility]  [address]  [facility] [id number] [phone]              Who to contact     If you have questions or need follow up information about today's clinic visit or your schedule please contact [facility] LUNG SCIENCE AND HEALTH directly at [phone].  Normal or non-critical lab and imaging results will be communicated to you by Instreet Networkhart, letter or phone within 4 business days after the clinic has received the results. If you do not hear from us within 7 days, please contact the clinic through FlyClipt or phone. If you have a critical or abnormal lab result, we will notify you by phone as soon as possible.  Submit refill requests through Helios or call your pharmacy and they will forward the refill request to us. Please allow 3 business days for your refill to be completed.          Additional Information About Your Visit        Instreet Networkhart Information     Helios gives you secure access to your electronic health record. If you see a primary care provider, you can also send messages to your care team and make appointments. If you have questions, please call your primary care clinic.  If you do not have a primary care provider, please call [phone]  and they will assist you.        Care EveryWhere ID     This is your Care EveryWhere ID. This could be used by other organizations to access your [place] medical records  [id number]         Blood Pressure from Last 3 Encounters:   04/10/17 104/67   02/28/17 103/60   01/18/17 96/61    Weight from Last 3 Encounters:   04/10/17 77.1 kg (170 lb)   02/28/17 78 kg (172 lb)   01/10/17 76.2 kg (168 lb)              Today, you had the following     No orders found for display       Primary Care Provider Office Phone # Fax #    Robert Sher [phone]        [facility] [address]  [facility] [address]        Thank you!     Thank you for choosing [facility] LUNG SCIENCE AND HEALTH  for your care. Our goal is always to provide you with excellent care. Hearing back from our patients is one way we can continue to improve our services. Please take a few minutes to complete the written survey that you may receive in the mail after your visit with us. Thank you!             Your Updated Medication List - Protect others around you: Learn how to safely use, store and throw away your medicines at www.disposemymeds.org.          This list is accurate as of: 4/14/17  9:40 AM.  Always use your most recent med list.                   Brand Name Dispense Instructions for use    azelastine 0.1 % spray    ASTELIN    1 Bottle    Spray 1-2 sprays into both nostrils 2 times daily       CLARITIN PO      Take 10 mg by mouth as needed       fluticasone 50 MCG/ACT spray    FLONASE    1 Bottle    Spray 2 sprays into both nostrils daily       montelukast 10 MG tablet    SINGULAIR    30 tablet    Take 1 tablet (10 mg) by mouth every evening       vitamin D3 2000 UNITS Caps

## 2017-04-17 ENCOUNTER — ALLIED HEALTH/NURSE VISIT (OUTPATIENT)
Dept: PULMONOLOGY | Facility: CLINIC | Age: 27
End: 2017-04-17
Attending: ALLERGY & IMMUNOLOGY
Payer: COMMERCIAL

## 2017-04-17 DIAGNOSIS — Z51.6 NEED FOR DESENSITIZATION TO ALLERGENS: Primary | ICD-10-CM

## 2017-04-17 PROCEDURE — 95117 IMMUNOTHERAPY INJECTIONS: CPT | Mod: ZF

## 2017-04-17 NOTE — NURSING NOTE
Chief Complaint   Patient presents with     Allergy Injection     Patient is here for prescribed allergy injection. 0.3 ml yellow top x 3 given. Pt waited 30 minutes in cindy Orosco comes into clinic today at the request of Robert Sher for allergy injections    No reaction to last injection.    This service provided today was under the direct supervision of Dr. Rivera, who was available if needed.    Teresita Perez, OLIVIER Perez  CMA at 9:29 AM on 4/17/2017

## 2017-04-17 NOTE — MR AVS SNAPSHOT
After Visit Summary   4/17/2017    Brett Orosco    MRN: [id number]           Patient Information     Date Of Birth          1990        Visit Information        Provider Department      4/17/2017 9:00 AM [facility] NURSE [facility]         Follow-ups after your visit        Your next 10 appointments already scheduled     Apr 26, 2017  5:30 PM CDT   Return Visit with Whitney Alexis [facility] ([facility])    [place] [facility]  [address]  [facility] [address] [phone]              Who to contact     If you have questions or need follow up information about today's clinic visit or your schedule please contact [facility] LUNG SCIENCE AND HEALTH directly at [phone].  Normal or non-critical lab and imaging results will be communicated to you by MyChart, letter or phone within 4 business days after the clinic has received the results. If you do not hear from us within 7 days, please contact the clinic through MyChart or phone. If you have a critical or abnormal lab result, we will notify you by phone as soon as possible.  Submit refill requests through [employer] or call your pharmacy and they will forward the refill request to us. Please allow 3 business days for your refill to be completed.          Additional Information About Your Visit        MyChart Information     [employer] gives you secure access to your electronic health record. If you see a primary care provider, you can also send messages to your care team and make appointments. If you have questions, please call your primary care clinic.  If you do not have a primary care provider, please call [phone] and they will assist you.        Care EveryWhere ID     This is your Care EveryWhere ID. This could be used by other organizations to access your [place] medical records  [id number]         Blood Pressure from Last 3  Encounters:   04/10/17 104/67   02/28/17 103/60   01/18/17 96/61    Weight from Last 3 Encounters:   04/10/17 77.1 kg (170 lb)   02/28/17 78 kg (172 lb)   01/10/17 76.2 kg (168 lb)              Today, you had the following     No orders found for display       Primary Care Provider Office Phone # Fax #    Robert Sher [phone]        [facility] [address]  [facility] [address]        Thank you!     Thank you for choosing [facility] FOR LUNG SCIENCE AND HEALTH  for your care. Our goal is always to provide you with excellent care. Hearing back from our patients is one way we can continue to improve our services. Please take a few minutes to complete the written survey that you may receive in the mail after your visit with us. Thank you!             Your Updated Medication List - Protect others around you: Learn how to safely use, store and throw away your medicines at www.disposemymeds.org.          This list is accurate as of: 4/17/17  9:17 AM.  Always use your most recent med list.                   Brand Name Dispense Instructions for use    azelastine 0.1 % spray    ASTELIN    1 Bottle    Spray 1-2 sprays into both nostrils 2 times daily       CLARITIN PO      Take 10 mg by mouth as needed       fluticasone 50 MCG/ACT spray    FLONASE    1 Bottle    Spray 2 sprays into both nostrils daily       montelukast 10 MG tablet    SINGULAIR    30 tablet    Take 1 tablet (10 mg) by mouth every evening       vitamin D3 2000 UNITS Caps

## 2017-04-21 ENCOUNTER — ALLIED HEALTH/NURSE VISIT (OUTPATIENT)
Dept: PULMONOLOGY | Facility: CLINIC | Age: 27
End: 2017-04-21
Attending: ALLERGY & IMMUNOLOGY
Payer: COMMERCIAL

## 2017-04-21 DIAGNOSIS — Z51.6 NEED FOR DESENSITIZATION TO ALLERGENS: Primary | ICD-10-CM

## 2017-04-21 PROCEDURE — 95117 IMMUNOTHERAPY INJECTIONS: CPT | Mod: ZF

## 2017-04-21 NOTE — NURSING NOTE
Chief Complaint   Patient presents with     Allergy Injection     Patient here for weekly allergy injections. Patient given .04 ml x3 from Yellow Bottle. Patient instructed to wait 30 min prior to leaving clinic today.   This Service provided today was under the direct supervision of Dr. Bhatia who was available if needed.     Angela Solo at 8:57 AM on 4/21/2017

## 2017-04-21 NOTE — MR AVS SNAPSHOT
After Visit Summary   4/21/2017    Brett Orosco    MRN: [id number]           Patient Information     Date Of Birth          1990        Visit Information        Provider Department      4/21/2017 10:00 AM [facility] NURSE [facility]        Today's Diagnoses     Need for desensitization to allergens    -  1       Follow-ups after your visit        Your next 10 appointments already scheduled     Apr 21, 2017 10:00 AM CDT   Nurse Visit with [facility] NURSE   [facility] ([facility] [place])    [address] [place] [id number] [phone]            Apr 24, 2017  9:30 AM CDT   Nurse Visit with [facility] NURSE   [facility] ([facility] [place])    [address] [place] [id number] [phone]            Apr 26, 2017  5:30 PM CDT   Return Visit with [facility] ([facility])    [place] [facility]  [address]  [facility] [address] [phone]            Apr 28, 2017  9:00 AM CDT   Nurse Visit with [facility] NURSE   [facility] ([facility] [place])    [address] [place] [id number] [phone]              Who to contact     If you have questions or need follow up information about today's clinic visit or your schedule please contact [facility] directly at [phone].  Normal or non-critical lab and imaging results will be communicated to you by MyChart, letter or phone within 4 business days after the clinic has received the results. If you do not hear from us within 7 days, please contact the clinic through MyChart or phone. If you have a critical or abnormal lab result, we will notify you by phone as soon as possible.  Submit refill  requests through [employer] or call your pharmacy and they will forward the refill request to us. Please allow 3 business days for your refill to be completed.          Additional Information About Your Visit        RADEUMhart Information     [employer] gives you secure access to your electronic health record. If you see a primary care provider, you can also send messages to your care team and make appointments. If you have questions, please call your primary care clinic.  If you do not have a primary care provider, please call [phone] and they will assist you.        Care EveryWhere ID     This is your Care EveryWhere ID. This could be used by other organizations to access your [place] medical records  [id number]         Blood Pressure from Last 3 Encounters:   04/10/17 104/67   02/28/17 103/60   01/18/17 96/61    Weight from Last 3 Encounters:   04/10/17 77.1 kg (170 lb)   02/28/17 78 kg (172 lb)   01/10/17 76.2 kg (168 lb)              Today, you had the following     No orders found for display       Primary Care Provider Office Phone # Fax #    Robert Sher [phone]        PHYSICIANS [address] [facility] [address]  [facility] [address]        Thank you!     Thank you for choosing [facility]  for your care. Our goal is always to provide you with excellent care. Hearing back from our patients is one way we can continue to improve our services. Please take a few minutes to complete the written survey that you may receive in the mail after your visit with us. Thank you!             Your Updated Medication List - Protect others around you: Learn how to safely use, store and throw away your medicines at www.disposemymeds.org.          This list is accurate as of: 4/21/17  9:12 AM.  Always use your most recent med list.                   Brand Name Dispense Instructions for use    azelastine 0.1 % spray    ASTELIN    1 Bottle    Spray 1-2 sprays into both nostrils  2 times daily       CLARITIN PO      Take 10 mg by mouth as needed       fluticasone 50 MCG/ACT spray    FLONASE    1 Bottle    Spray 2 sprays into both nostrils daily       montelukast 10 MG tablet    SINGULAIR    30 tablet    Take 1 tablet (10 mg) by mouth every evening       vitamin D3 2000 UNITS Caps

## 2017-04-24 ENCOUNTER — ALLIED HEALTH/NURSE VISIT (OUTPATIENT)
Dept: PULMONOLOGY | Facility: CLINIC | Age: 27
End: 2017-04-24
Attending: ALLERGY & IMMUNOLOGY
Payer: COMMERCIAL

## 2017-04-24 DIAGNOSIS — Z51.6 NEED FOR DESENSITIZATION TO ALLERGENS: Primary | ICD-10-CM

## 2017-04-24 PROCEDURE — 95117 IMMUNOTHERAPY INJECTIONS: CPT | Mod: ZF

## 2017-04-24 NOTE — NURSING NOTE
Chief Complaint   Patient presents with     Allergy Injection     Patient is here for allergy injection. 0.5 ml yellow top x 3 given. Pt waited 30 minutes      Teresita Perez  CMA at 10:53 AM on 4/24/2017  Brett Jeramie Orosco comes into clinic today at the request of Robert Sher for allergy injections    slight reaction to last injection.    This service provided today was under the direct supervision of Dr. Rivera, who was available if needed.    Teresita Perez CMA

## 2017-04-24 NOTE — MR AVS SNAPSHOT
After Visit Summary   4/24/2017    Brett Orosco    MRN: [id number]           Patient Information     Date Of Birth          1990        Visit Information        Provider Department      4/24/2017 9:30 AM [facility] NURSE [facility]         Follow-ups after your visit        Your next 10 appointments already scheduled     Apr 24, 2017  9:30 AM CDT   Nurse Visit with [facility] NURSE   [facility] ([facility] [place])    [address]  3rd Floor  [facility] [id number] [phone]            Apr 26, 2017  5:30 PM CDT   Return Visit with [facility] ([facility])    [place] [facility]  [address]  [facility] [id number] [phone]            Apr 28, 2017  9:00 AM CDT   Nurse Visit with [facility] NURSE   [facility] ([facility] [place])    [address]  [address] [facility] [id number] [phone]              Who to contact     If you have questions or need follow up information about today's clinic visit or your schedule please contact [facility] directly at [phone].  Normal or non-critical lab and imaging results will be communicated to you by Airway Therapeuticshart, letter or phone within 4 business days after the clinic has received the results. If you do not hear from us within 7 days, please contact the clinic through Airway Therapeuticshart or phone. If you have a critical or abnormal lab result, we will notify you by phone as soon as possible.  Submit refill requests through [employer] or call your pharmacy and they will forward the refill request to us. Please allow 3 business days for your refill to be completed.          Additional Information About Your Visit        Airway Therapeuticshart Information     [employer] gives you secure access to your electronic health record. If  you see a primary care provider, you can also send messages to your care team and make appointments. If you have questions, please call your primary care clinic.  If you do not have a primary care provider, please call [phone] and they will assist you.        Care EveryWhere ID     This is your Care EveryWhere ID. This could be used by other organizations to access your [place] medical records  [id number]         Blood Pressure from Last 3 Encounters:   04/10/17 104/67   02/28/17 103/60   01/18/17 96/61    Weight from Last 3 Encounters:   04/10/17 77.1 kg (170 lb)   02/28/17 78 kg (172 lb)   01/10/17 76.2 kg (168 lb)              Today, you had the following     No orders found for display       Primary Care Provider Office Phone # Fax #    Robert Sher [phone]        PHYSICIANS [address] [facility] [address]  [facility] [address]        Thank you!     Thank you for choosing [facility]  for your care. Our goal is always to provide you with excellent care. Hearing back from our patients is one way we can continue to improve our services. Please take a few minutes to complete the written survey that you may receive in the mail after your visit with us. Thank you!             Your Updated Medication List - Protect others around you: Learn how to safely use, store and throw away your medicines at www.disposemymeds.org.          This list is accurate as of: 4/24/17  9:19 AM.  Always use your most recent med list.                   Brand Name Dispense Instructions for use    azelastine 0.1 % spray    ASTELIN    1 Bottle    Spray 1-2 sprays into both nostrils 2 times daily       CLARITIN PO      Take 10 mg by mouth as needed       fluticasone 50 MCG/ACT spray    FLONASE    1 Bottle    Spray 2 sprays into both nostrils daily       montelukast 10 MG tablet    SINGULAIR    30 tablet    Take 1 tablet (10 mg) by mouth every evening       vitamin D3 2000 UNITS Caps

## 2017-04-25 LAB
EXPTIME-PRE: 9.18 SEC
FEF2575-%PRED-POST: 86 %
FEF2575-%PRED-PRE: 65 %
FEF2575-POST: 4.3 L/SEC
FEF2575-PRE: 3.25 L/SEC
FEF2575-PRED: 4.96 L/SEC
FEFMAX-%PRED-PRE: 96 %
FEFMAX-PRE: 10.56 L/SEC
FEFMAX-PRED: 10.94 L/SEC
FEV1-%PRED-PRE: 97 %
FEV1-PRE: 4.85 L
FEV1FEV6-PRE: 68 %
FEV1FEV6-PRED: 83 %
FEV1FVC-PRE: 67 %
FEV1FVC-PRED: 82 %
FIFMAX-PRE: 6.55 L/SEC
FVC-%PRED-PRE: 118 %
FVC-PRE: 7.18 L
FVC-PRED: 6.05 L

## 2017-04-28 ENCOUNTER — ALLIED HEALTH/NURSE VISIT (OUTPATIENT)
Dept: PULMONOLOGY | Facility: CLINIC | Age: 27
End: 2017-04-28
Attending: ALLERGY & IMMUNOLOGY
Payer: COMMERCIAL

## 2017-04-28 DIAGNOSIS — Z51.6 NEED FOR DESENSITIZATION TO ALLERGENS: Primary | ICD-10-CM

## 2017-04-28 PROCEDURE — 95117 IMMUNOTHERAPY INJECTIONS: CPT | Mod: ZF

## 2017-04-28 NOTE — MR AVS SNAPSHOT
After Visit Summary   4/28/2017    Brett Orosco    MRN: [id number]           Patient Information     Date Of Birth          1990        Visit Information        Provider Department      4/28/2017 9:00 AM [facility] NURSE [facility]        Today's Diagnoses     Need for desensitization to allergens    -  1       Follow-ups after your visit        Who to contact     If you have questions or need follow up information about today's clinic visit or your schedule please contact [facility] directly at [phone].  Normal or non-critical lab and imaging results will be communicated to you by Sense.lyhart, letter or phone within 4 business days after the clinic has received the results. If you do not hear from us within 7 days, please contact the clinic through NearWoot or phone. If you have a critical or abnormal lab result, we will notify you by phone as soon as possible.  Submit refill requests through [employer] or call your pharmacy and they will forward the refill request to us. Please allow 3 business days for your refill to be completed.          Additional Information About Your Visit        MyChart Information     [employer] gives you secure access to your electronic health record. If you see a primary care provider, you can also send messages to your care team and make appointments. If you have questions, please call your primary care clinic.  If you do not have a primary care provider, please call [phone] and they will assist you.        Care EveryWhere ID     This is your Care EveryWhere ID. This could be used by other organizations to access your [place] medical records  [id number]         Blood Pressure from Last 3 Encounters:   04/10/17 104/67   02/28/17 103/60   01/18/17 96/61    Weight from Last 3 Encounters:   04/10/17 77.1 kg (170 lb)   02/28/17 78 kg (172 lb)   01/10/17 76.2 kg (168 lb)              Today, you had the  following     No orders found for display       Primary Care Provider Office Phone # Fax #    Robert Sher [phone]        PHYSICIANS [address] [facility] [address]  [facility] [address]        Thank you!     Thank you for choosing [facility] L[facility] HE[facility]  for your care. Our goal is always to provide you with excellent care. Hearing back from our patients is one way we can continue to improve our services. Please take a few minutes to complete the written survey that you may receive in the mail after your visit with us. Thank you!             Your Updated Medication List - Protect others around you: Learn how to safely use, store and throw away your medicines at www.disposemymeds.org.          This list is accurate as of: 4/28/17 10:19 AM.  Always use your most recent med list.                   Brand Name Dispense Instructions for use    azelastine 0.1 % spray    ASTELIN    1 Bottle    Spray 1-2 sprays into both nostrils 2 times daily       CLARITIN PO      Take 10 mg by mouth as needed       fluticasone 50 MCG/ACT spray    FLONASE    1 Bottle    Spray 2 sprays into both nostrils daily       montelukast 10 MG tablet    SINGULAIR    30 tablet    Take 1 tablet (10 mg) by mouth every evening       vitamin D3 2000 UNITS Caps

## 2017-04-28 NOTE — NURSING NOTE
Patient came in for weekly Allergy injection. Patient had no reaction to last weeks shots. I gave 0.6 ml of the yellow bottle sub cutaneous without any issues.   Brett Orosco comes into clinic today at the request of Robert Sher for allergy injections    No reaction to last injection.    This service provided today was under the direct supervision of Dr. Rivera, who was available if needed.    Kishor Holliday, OLIVIER Holliday CMA at 9:51 AM on 4/28/2017

## 2017-05-01 ENCOUNTER — ALLIED HEALTH/NURSE VISIT (OUTPATIENT)
Dept: PULMONOLOGY | Facility: CLINIC | Age: 27
End: 2017-05-01
Attending: ALLERGY & IMMUNOLOGY
Payer: COMMERCIAL

## 2017-05-01 DIAGNOSIS — Z51.6 NEED FOR DESENSITIZATION TO ALLERGENS: Primary | ICD-10-CM

## 2017-05-01 PROCEDURE — 95117 IMMUNOTHERAPY INJECTIONS: CPT | Mod: ZF

## 2017-05-01 NOTE — NURSING NOTE
Chief Complaint   Patient presents with     Allergy Injection     Patient is here for prescribed allergy injections. Pt given 0.8 ml yellow top x 3. Patient waited in lobby for 30 minutes      Teresita Perez CMA at 10:45 AM on 5/1/2017  Brett Orosco comes into clinic today at the request of Robert Sher for allergy injections    No reaction to last injection.    This service provided today was under the direct supervision of Dr. Rivera, who was available if needed.    Teresita Perez CMA

## 2017-05-01 NOTE — MR AVS SNAPSHOT
After Visit Summary   5/1/2017    Brett Orosco    MRN: [id number]           Patient Information     Date Of Birth          1990        Visit Information        Provider Department      5/1/2017 10:00 AM [facility] NURSE [facility]        Today's Diagnoses     Need for desensitization to allergens    -  1       Follow-ups after your visit        Your next 10 appointments already scheduled     May 05, 2017  9:00 AM CDT   Nurse Visit with [facility] NURSE   [facility] ([facility])    [address] [place] [address]  3rd [facility] [address] [phone]              Who to contact     If you have questions or need follow up information about today's clinic visit or your schedule please contact [facility] L[facility] HE[facility] directly at [phone].  Normal or non-critical lab and imaging results will be communicated to you by DadShedhart, letter or phone within 4 business days after the clinic has received the results. If you do not hear from us within 7 days, please contact the clinic through DadShedhart or phone. If you have a critical or abnormal lab result, we will notify you by phone as soon as possible.  Submit refill requests through [employer] or call your pharmacy and they will forward the refill request to us. Please allow 3 business days for your refill to be completed.          Additional Information About Your Visit        MyChart Information     [employer] gives you secure access to your electronic health record. If you see a primary care provider, you can also send messages to your care team and make appointments. If you have questions, please call your primary care clinic.  If you do not have a primary care provider, please call [phone] and they will assist you.        Care EveryWhere ID     This is your Care EveryWhere ID. This could be used by other organizations to access your [place]  medical records  [id number]         Blood Pressure from Last 3 Encounters:   04/10/17 104/67   02/28/17 103/60   01/18/17 96/61    Weight from Last 3 Encounters:   04/10/17 77.1 kg (170 lb)   02/28/17 78 kg (172 lb)   01/10/17 76.2 kg (168 lb)              Today, you had the following     No orders found for display       Primary Care Provider Office Phone # Fax #    Robert Sher [phone]        PHYSICIANS [address] [facility] [address]  [facility] [address]        Thank you!     Thank you for choosing [facility]  for your care. Our goal is always to provide you with excellent care. Hearing back from our patients is one way we can continue to improve our services. Please take a few minutes to complete the written survey that you may receive in the mail after your visit with us. Thank you!             Your Updated Medication List - Protect others around you: Learn how to safely use, store and throw away your medicines at www.disposemymeds.org.          This list is accurate as of: 5/1/17  2:08 PM.  Always use your most recent med list.                   Brand Name Dispense Instructions for use    azelastine 0.1 % spray    ASTELIN    1 Bottle    Spray 1-2 sprays into both nostrils 2 times daily       CLARITIN PO      Take 10 mg by mouth as needed       fluticasone 50 MCG/ACT spray    FLONASE    1 Bottle    Spray 2 sprays into both nostrils daily       montelukast 10 MG tablet    SINGULAIR    30 tablet    Take 1 tablet (10 mg) by mouth every evening       vitamin D3 2000 UNITS Caps

## 2017-05-04 ENCOUNTER — FCC EXTENDED DOCUMENTATION (OUTPATIENT)
Dept: PSYCHOLOGY | Facility: CLINIC | Age: 27
End: 2017-05-04

## 2017-05-04 DIAGNOSIS — F12.21 CANNABIS USE DISORDER, SEVERE, IN EARLY REMISSION (H): ICD-10-CM

## 2017-05-04 DIAGNOSIS — F32.A DEPRESSION: Primary | ICD-10-CM

## 2017-05-04 DIAGNOSIS — F41.9 ANXIETY: ICD-10-CM

## 2017-05-04 NOTE — PROGRESS NOTES
Discharge Summary  Single Session    Client Name: Brett Orosco MRN#: [id number] YOB: 1990      Intake / Discharge Date: 5/4/2017      DSM5 Diagnoses: (Sustained by DSM5 Criteria Listed Above)  Diagnoses: PROVISIONAL 300.00 (F41.9) Unspecified Anxiety Disorder; 311 Other/unspec. 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  Psychosocial & Contextual Factors: Interpersonal issues with girlfriend, severe allergies and sleep issues  WHODAS 2.0 (12 item) Score: 14          Presenting Concern:  Not getting along with girlfriend, said therapy was his girlfriend's idea, but he is open to it      Reason for Discharge:  Client did not return      Disposition at Time of Last Encounter:   Comments:   Stable, ambivalent about therapy, felt like his girlfriend needed therapy and not him     Risk Management:   Client denies a history of suicidal ideation, suicide attempts, self-injurious behavior, homicidal ideation, homicidal behavior and and other safety concerns  A safety and risk management plan has not been developed at this time, however client was given the after-hours number / 911 should there be a change in any of these risk factors.      Referred To:  PCP        ANA Todd   5/4/2017

## 2017-05-05 ENCOUNTER — ALLIED HEALTH/NURSE VISIT (OUTPATIENT)
Dept: PULMONOLOGY | Facility: CLINIC | Age: 27
End: 2017-05-05
Attending: ALLERGY & IMMUNOLOGY
Payer: COMMERCIAL

## 2017-05-05 DIAGNOSIS — Z51.6 NEED FOR DESENSITIZATION TO ALLERGENS: Primary | ICD-10-CM

## 2017-05-05 PROCEDURE — 95117 IMMUNOTHERAPY INJECTIONS: CPT | Mod: ZF

## 2017-05-05 NOTE — MR AVS SNAPSHOT
After Visit Summary   5/5/2017    Brett Orosco    MRN: [id number]           Patient Information     Date Of Birth          1990        Visit Information        Provider Department      5/5/2017 9:00 AM [facility] NURSE [facility]        Today's Diagnoses     Need for desensitization to allergens    -  1       Follow-ups after your visit        Who to contact     If you have questions or need follow up information about today's clinic visit or your schedule please contact [facility] HEALTH directly at [phone].  Normal or non-critical lab and imaging results will be communicated to you by Lot78hart, letter or phone within 4 business days after the clinic has received the results. If you do not hear from us within 7 days, please contact the clinic through Zipzoomt or phone. If you have a critical or abnormal lab result, we will notify you by phone as soon as possible.  Submit refill requests through [employer] or call your pharmacy and they will forward the refill request to us. Please allow 3 business days for your refill to be completed.          Additional Information About Your Visit        MyChart Information     [employer] gives you secure access to your electronic health record. If you see a primary care provider, you can also send messages to your care team and make appointments. If you have questions, please call your primary care clinic.  If you do not have a primary care provider, please call [phone] and they will assist you.        Care EveryWhere ID     This is your Care EveryWhere ID. This could be used by other organizations to access your [place] medical records  [id number]         Blood Pressure from Last 3 Encounters:   04/10/17 104/67   02/28/17 103/60   01/18/17 96/61    Weight from Last 3 Encounters:   04/10/17 77.1 kg (170 lb)   02/28/17 78 kg (172 lb)   01/10/17 76.2 kg (168 lb)              Today, you had the  following     No orders found for display       Primary Care Provider Office Phone # Fax #    Robert Sher [phone]        PHYSICIANS [address] [facility] [address]  [facility] [address]        Thank you!     Thank you for choosing [facility] LUNG SCIENCE AND HEALTH  for your care. Our goal is always to provide you with excellent care. Hearing back from our patients is one way we can continue to improve our services. Please take a few minutes to complete the written survey that you may receive in the mail after your visit with us. Thank you!             Your Updated Medication List - Protect others around you: Learn how to safely use, store and throw away your medicines at www.disposemymeds.org.          This list is accurate as of: 5/5/17 10:32 AM.  Always use your most recent med list.                   Brand Name Dispense Instructions for use    azelastine 0.1 % spray    ASTELIN    1 Bottle    Spray 1-2 sprays into both nostrils 2 times daily       CLARITIN PO      Take 10 mg by mouth as needed       fluticasone 50 MCG/ACT spray    FLONASE    1 Bottle    Spray 2 sprays into both nostrils daily       montelukast 10 MG tablet    SINGULAIR    30 tablet    Take 1 tablet (10 mg) by mouth every evening       vitamin D3 2000 UNITS Caps

## 2017-05-05 NOTE — NURSING NOTE
Patient came in for weekly Allergy injection. Patient had no reaction to last weeks shots. I gave 0.1 ml of the red bottle sub cutaneous without any issues.   Brett Orosco comes into clinic today at the request of Robert Sher for allergy injections    No reaction to last injection.    This service provided today was under the direct supervision of Dr. Rivera, who was available if needed.    Kishor Holliday, OLIVIER Holliday CMA at 9:35 AM on 5/5/2017

## 2017-05-08 ENCOUNTER — ALLIED HEALTH/NURSE VISIT (OUTPATIENT)
Dept: PULMONOLOGY | Facility: CLINIC | Age: 27
End: 2017-05-08
Attending: ALLERGY & IMMUNOLOGY
Payer: COMMERCIAL

## 2017-05-08 DIAGNOSIS — Z51.6 NEED FOR DESENSITIZATION TO ALLERGENS: Primary | ICD-10-CM

## 2017-05-08 PROCEDURE — 95117 IMMUNOTHERAPY INJECTIONS: CPT | Mod: ZF

## 2017-05-08 NOTE — MR AVS SNAPSHOT
After Visit Summary   5/8/2017    Brett Orosco    MRN: [id number]           Patient Information     Date Of Birth          1990        Visit Information        Provider Department      5/8/2017 12:00 PM [facility] NURSE [facility] and [facility]        Today's Diagnoses     Need for desensitization to allergens    -  1       Follow-ups after your visit        Who to contact     If you have questions or need follow up information about today's clinic visit or your schedule please contact [facility] directly at [phone].  Normal or non-critical lab and imaging results will be communicated to you by Billogramhart, letter or phone within 4 business days after the clinic has received the results. If you do not hear from us within 7 days, please contact the clinic through [employer] Groupt or phone. If you have a critical or abnormal lab result, we will notify you by phone as soon as possible.  Submit refill requests through Synthorx or call your pharmacy and they will forward the refill request to us. Please allow 3 business days for your refill to be completed.          Additional Information About Your Visit        MyChart Information     Synthorx gives you secure access to your electronic health record. If you see a primary care provider, you can also send messages to your care team and make appointments. If you have questions, please call your primary care clinic.  If you do not have a primary care provider, please call [phone] and they will assist you.        Care EveryWhere ID     This is your Care EveryWhere ID. This could be used by other organizations to access your [place] medical records  [id number]         Blood Pressure from Last 3 Encounters:   04/10/17 104/67   02/28/17 103/60   01/18/17 96/61    Weight from Last 3 Encounters:   04/10/17 77.1 kg (170 lb)   02/28/17 78 kg (172 lb)   01/10/17 76.2 kg (168 lb)              Today, you had the  following     No orders found for display       Primary Care Provider Office Phone # Fax #    Robert Sher [phone]        PHYSICIANS [address] [facility] [address]  [facility] [address]        Thank you!     Thank you for choosing [facility] L[facility]ENCE AND HE[facility]  for your care. Our goal is always to provide you with excellent care. Hearing back from our patients is one way we can continue to improve our services. Please take a few minutes to complete the written survey that you may receive in the mail after your visit with us. Thank you!             Your Updated Medication List - Protect others around you: Learn how to safely use, store and throw away your medicines at www.disposemymeds.org.          This list is accurate as of: 5/8/17 12:30 PM.  Always use your most recent med list.                   Brand Name Dispense Instructions for use    azelastine 0.1 % spray    ASTELIN    1 Bottle    Spray 1-2 sprays into both nostrils 2 times daily       CLARITIN PO      Take 10 mg by mouth as needed       fluticasone 50 MCG/ACT spray    FLONASE    1 Bottle    Spray 2 sprays into both nostrils daily       montelukast 10 MG tablet    SINGULAIR    30 tablet    Take 1 tablet (10 mg) by mouth every evening       vitamin D3 2000 UNITS Caps

## 2017-05-08 NOTE — NURSING NOTE
Patient came in for weekly Allergy injection. Patient had no reaction to last weeks shots. I gave 0.15 ml of the red bottle sub cutaneous without any issues.   Brett Orosco comes into clinic today at the request of Robert Sher for allergy injections    No reaction to last injection.    This service provided today was under the direct supervision of Dr. Rivera, who was available if needed.    Kishor Holliday, OLIVIER Holliday CMA at 12:16 PM on 5/8/2017

## 2017-05-12 ENCOUNTER — ALLIED HEALTH/NURSE VISIT (OUTPATIENT)
Dept: PULMONOLOGY | Facility: CLINIC | Age: 27
End: 2017-05-12
Attending: ALLERGY & IMMUNOLOGY
Payer: COMMERCIAL

## 2017-05-12 DIAGNOSIS — Z51.6 NEED FOR DESENSITIZATION TO ALLERGENS: Primary | ICD-10-CM

## 2017-05-12 PROCEDURE — 95117 IMMUNOTHERAPY INJECTIONS: CPT | Mod: ZF

## 2017-05-12 NOTE — MR AVS SNAPSHOT
After Visit Summary   5/12/2017    Brett Orosco    MRN: [id number]           Patient Information     Date Of Birth          1990        Visit Information        Provider Department      5/12/2017 9:00 AM [facility] NURSE [facility] Lung Science and [facility]        Today's Diagnoses     Need for desensitization to allergens    -  1       Follow-ups after your visit        Who to contact     If you have questions or need follow up information about today's clinic visit or your schedule please contact [facility] directly at [phone].  Normal or non-critical lab and imaging results will be communicated to you by FirstRidehart, letter or phone within 4 business days after the clinic has received the results. If you do not hear from us within 7 days, please contact the clinic through [employer] Systemst or phone. If you have a critical or abnormal lab result, we will notify you by phone as soon as possible.  Submit refill requests through Bigvest or call your pharmacy and they will forward the refill request to us. Please allow 3 business days for your refill to be completed.          Additional Information About Your Visit        MyChart Information     Bigvest gives you secure access to your electronic health record. If you see a primary care provider, you can also send messages to your care team and make appointments. If you have questions, please call your primary care clinic.  If you do not have a primary care provider, please call [phone] and they will assist you.        Care EveryWhere ID     This is your Care EveryWhere ID. This could be used by other organizations to access your [place] medical records  [id number]         Blood Pressure from Last 3 Encounters:   04/10/17 104/67   02/28/17 103/60   01/18/17 96/61    Weight from Last 3 Encounters:   04/10/17 77.1 kg (170 lb)   02/28/17 78 kg (172 lb)   01/10/17 76.2 kg (168 lb)              Today, you had the  following     No orders found for display       Primary Care Provider Office Phone # Fax #    Robert Sher [phone]        PHYSICIANS [address] [facility] [address]  [facility] [address]        Thank you!     Thank you for choosing [facility] L[facility] SCIENCE AND HEALTH  for your care. Our goal is always to provide you with excellent care. Hearing back from our patients is one way we can continue to improve our services. Please take a few minutes to complete the written survey that you may receive in the mail after your visit with us. Thank you!             Your Updated Medication List - Protect others around you: Learn how to safely use, store and throw away your medicines at www.disposemymeds.org.          This list is accurate as of: 5/12/17 10:38 AM.  Always use your most recent med list.                   Brand Name Dispense Instructions for use    azelastine 0.1 % spray    ASTELIN    1 Bottle    Spray 1-2 sprays into both nostrils 2 times daily       CLARITIN PO      Take 10 mg by mouth as needed       fluticasone 50 MCG/ACT spray    FLONASE    1 Bottle    Spray 2 sprays into both nostrils daily       montelukast 10 MG tablet    SINGULAIR    30 tablet    Take 1 tablet (10 mg) by mouth every evening       vitamin D3 2000 UNITS Caps

## 2017-05-12 NOTE — NURSING NOTE
Patient came in for weekly Allergy injection. Patient had no reaction to last weeks shots. I gave 0.2 ml of the red bottle sub cutaneous without any issues.   Brett Orosco comes into clinic today at the request of Robert Sher for allergy injections    No reaction to last injection.    This service provided today was under the direct supervision of Dr. Rivera, who was available if needed.    Kishor Holliday, OLIVIER Holliday CMA at 9:24 AM on 5/12/2017

## 2017-05-16 ENCOUNTER — ALLIED HEALTH/NURSE VISIT (OUTPATIENT)
Dept: PULMONOLOGY | Facility: CLINIC | Age: 27
End: 2017-05-16
Attending: ALLERGY & IMMUNOLOGY
Payer: COMMERCIAL

## 2017-05-16 DIAGNOSIS — Z51.6 NEED FOR DESENSITIZATION TO ALLERGENS: Primary | ICD-10-CM

## 2017-05-16 PROCEDURE — 95117 IMMUNOTHERAPY INJECTIONS: CPT | Mod: ZF

## 2017-05-16 NOTE — NURSING NOTE
Patient came in for weekly Allergy injection. Patient had no reaction to last weeks shots. I gave 0.25 ml of the red bottle sub cutaneous without any issues.   Brett Orosco comes into clinic today at the request of Charlie Rivera for allergy injections    No reaction to last injection.    This service provided today was under the direct supervision of Dr. Rivera, who was available if needed.    Kishor Holliday, OLIVIER Holliday CMA at 10:18 AM on 5/16/2017

## 2017-05-16 NOTE — MR AVS SNAPSHOT
After Visit Summary   5/16/2017    Brett Orosco    MRN: [id number]           Patient Information     Date Of Birth          1990        Visit Information        Provider Department      5/16/2017 9:00 AM [facility] NURSE [facility] Health        Today's Diagnoses     Need for desensitization to allergens    -  1       Follow-ups after your visit        Your next 10 appointments already scheduled     May 19, 2017 10:00 AM CDT   Nurse Visit with [facility] NURSE   [facility] ([facility] [place])    [address] [place] [id number] [phone]            May 23, 2017 10:00 AM CDT   Nurse Visit with [facility] NURSE   [facility] ([facility] [place])    [address] [place] [id number] [phone]            May 26, 2017  9:30 AM CDT   Nurse Visit with [facility] NURSE   [facility] ([facility] [place])    [address] [place] [id number] [phone]              Who to contact     If you have questions or need follow up information about today's clinic visit or your schedule please contact [facility] L[facility]ENCE [facility] HE[facility] directly at [phone].  Normal or non-critical lab and imaging results will be communicated to you by MyChart, letter or phone within 4 business days after the clinic has received the results. If you do not hear from us within 7 days, please contact the clinic through MyChart or phone. If you have a critical or abnormal lab result, we will notify you by phone as soon as possible.  Submit refill requests through Rock'n Rover or call your pharmacy and they will forward the refill request to us. Please allow 3 business days for your refill to be completed.          Additional Information About Your Visit        [facility]  Information     [employer] gives you secure access to your electronic health record. If you see a primary care provider, you can also send messages to your care team and make appointments. If you have questions, please call your primary care clinic.  If you do not have a primary care provider, please call [phone] and they will assist you.        Care EveryWhere ID     This is your Care EveryWhere ID. This could be used by other organizations to access your [place] medical records  [id number]         Blood Pressure from Last 3 Encounters:   04/10/17 104/67   02/28/17 103/60   01/18/17 96/61    Weight from Last 3 Encounters:   04/10/17 77.1 kg (170 lb)   02/28/17 78 kg (172 lb)   01/10/17 76.2 kg (168 lb)              Today, you had the following     No orders found for display       Primary Care Provider Office Phone # Fax #    Robert Sher [phone]        PHYSICIANS [address] [facility] [address]  [facility] [address]        Thank you!     Thank you for choosing [facility] FOR LUNG SCIENCE AND HEALTH  for your care. Our goal is always to provide you with excellent care. Hearing back from our patients is one way we can continue to improve our services. Please take a few minutes to complete the written survey that you may receive in the mail after your visit with us. Thank you!             Your Updated Medication List - Protect others around you: Learn how to safely use, store and throw away your medicines at www.disposemymeds.org.          This list is accurate as of: 5/16/17 10:21 AM.  Always use your most recent med list.                   Brand Name Dispense Instructions for use    azelastine 0.1 % spray    ASTELIN    1 Bottle    Spray 1-2 sprays into both nostrils 2 times daily       CLARITIN PO      Take 10 mg by mouth as needed       fluticasone 50 MCG/ACT spray    FLONASE    1 Bottle    Spray 2 sprays into both nostrils daily       montelukast 10 MG tablet    SINGULAIR    30  tablet    Take 1 tablet (10 mg) by mouth every evening       vitamin D3 2000 UNITS Caps

## 2017-05-19 ENCOUNTER — ALLIED HEALTH/NURSE VISIT (OUTPATIENT)
Dept: PULMONOLOGY | Facility: CLINIC | Age: 27
End: 2017-05-19
Attending: ALLERGY & IMMUNOLOGY
Payer: COMMERCIAL

## 2017-05-19 ENCOUNTER — TRANSFERRED RECORDS (OUTPATIENT)
Dept: HEALTH INFORMATION MANAGEMENT | Facility: CLINIC | Age: 27
End: 2017-05-19

## 2017-05-19 DIAGNOSIS — Z51.6 NEED FOR DESENSITIZATION TO ALLERGENS: Primary | ICD-10-CM

## 2017-05-19 PROCEDURE — 95117 IMMUNOTHERAPY INJECTIONS: CPT | Mod: ZF

## 2017-05-19 NOTE — MR AVS SNAPSHOT
After Visit Summary   5/19/2017    Brett Orosco    MRN: [id number]           Patient Information     Date Of Birth          1990        Visit Information        Provider Department      5/19/2017 10:00 AM [facility] NURSE [facility] Lung Science and Health        Today's Diagnoses     Need for desensitization to allergens    -  1       Follow-ups after your visit        Your next 10 appointments already scheduled     May 23, 2017 10:00 AM CDT   Nurse Visit with [facility] NURSE   [facility] Lung Science and Health ([facility] [place])    [address] [place] [id number] [phone]            May 26, 2017  9:30 AM CDT   Nurse Visit with [facility] NURSE   [facility] ([facility] [place])    [address] [place] [address] [phone]              Who to contact     If you have questions or need follow up information about today's clinic visit or your schedule please contact [facility] L[facility] SCIENCE AND HEALTH directly at [phone].  Normal or non-critical lab and imaging results will be communicated to you by [email or web address], letter or phone within 4 business days after the clinic has received the results. If you do not hear from us within 7 days, please contact the clinic through RxAppst or phone. If you have a critical or abnormal lab result, we will notify you by phone as soon as possible.  Submit refill requests through Diagnovus or call your pharmacy and they will forward the refill request to us. Please allow 3 business days for your refill to be completed.          Additional Information About Your Visit        [email or web address] Information     Diagnovus gives you secure access to your electronic health record. If you see a primary care provider, you can also send messages to your care team and make appointments. If you have questions, please call your primary care  clinic.  If you do not have a primary care provider, please call [phone] and they will assist you.        Care EveryWhere ID     This is your Care EveryWhere ID. This could be used by other organizations to access your [place] medical records  [id number]         Blood Pressure from Last 3 Encounters:   04/10/17 104/67   02/28/17 103/60   01/18/17 96/61    Weight from Last 3 Encounters:   04/10/17 77.1 kg (170 lb)   02/28/17 78 kg (172 lb)   01/10/17 76.2 kg (168 lb)              Today, you had the following     No orders found for display       Primary Care Provider Office Phone # Fax #    Robert Sher [phone]        PHYSICIANS [address] [place] [address] [address]  [facility] [address]        Thank you!     Thank you for choosing [facility] FOR LUNG SCIENCE AND HEALTH  for your care. Our goal is always to provide you with excellent care. Hearing back from our patients is one way we can continue to improve our services. Please take a few minutes to complete the written survey that you may receive in the mail after your visit with us. Thank you!             Your Updated Medication List - Protect others around you: Learn how to safely use, store and throw away your medicines at www.disposemymeds.org.          This list is accurate as of: 5/19/17  3:05 PM.  Always use your most recent med list.                   Brand Name Dispense Instructions for use    azelastine 0.1 % spray    ASTELIN    1 Bottle    Spray 1-2 sprays into both nostrils 2 times daily       CLARITIN PO      Take 10 mg by mouth as needed       fluticasone 50 MCG/ACT spray    FLONASE    1 Bottle    Spray 2 sprays into both nostrils daily       montelukast 10 MG tablet    SINGULAIR    30 tablet    Take 1 tablet (10 mg) by mouth every evening       vitamin D3 2000 UNITS Caps

## 2017-05-19 NOTE — NURSING NOTE
Chief Complaint   Patient presents with     Allergy Injection     Patient is here for allergy injections. 0.3 ml red top x 3 given. Pt to wait in lobby for 30 minutes     Brettnicki Orosco comes into clinic today at the request of Robert Sher for allergy injections    No reaction to last injection.    This service provided today was under the direct supervision of Dr. Lemus, who was available if needed.    Teresita Perez, OLIVIER Perez  CMA at 12:38 PM on 5/19/2017

## 2017-05-23 ENCOUNTER — ALLIED HEALTH/NURSE VISIT (OUTPATIENT)
Dept: PULMONOLOGY | Facility: CLINIC | Age: 27
End: 2017-05-23
Attending: ALLERGY & IMMUNOLOGY
Payer: COMMERCIAL

## 2017-05-23 DIAGNOSIS — Z51.6 NEED FOR DESENSITIZATION TO ALLERGENS: Primary | ICD-10-CM

## 2017-05-23 PROCEDURE — 95117 IMMUNOTHERAPY INJECTIONS: CPT | Mod: ZF

## 2017-05-23 NOTE — MR AVS SNAPSHOT
After Visit Summary   5/23/2017    Brett Orosco    MRN: [id number]           Patient Information     Date Of Birth          1990        Visit Information        Provider Department      5/23/2017 10:00 AM [facility] NURSE [facility] Lung Science and Health        Today's Diagnoses     Need for desensitization to allergens    -  1       Follow-ups after your visit        Your next 10 appointments already scheduled     May 26, 2017  9:30 AM CDT   Nurse Visit with [facility] NURSE   [facility] Lung Science and Health ([facility])    [address] [place] [address]  [address] [facility] [address] [phone]              Who to contact     If you have questions or need follow up information about today's clinic visit or your schedule please contact [facility] L[facility] SCIENCE AND HE[facility] directly at [phone].  Normal or non-critical lab and imaging results will be communicated to you by eTippinghart, letter or phone within 4 business days after the clinic has received the results. If you do not hear from us within 7 days, please contact the clinic through eTippinghart or phone. If you have a critical or abnormal lab result, we will notify you by phone as soon as possible.  Submit refill requests through Mobbles or call your pharmacy and they will forward the refill request to us. Please allow 3 business days for your refill to be completed.          Additional Information About Your Visit        eTippinghart Information     Mobbles gives you secure access to your electronic health record. If you see a primary care provider, you can also send messages to your care team and make appointments. If you have questions, please call your primary care clinic.  If you do not have a primary care provider, please call [phone] and they will assist you.        Care EveryWhere ID     This is your Care EveryWhere ID. This could be used by other organizations to access your  [place] medical records  [id number]         Blood Pressure from Last 3 Encounters:   04/10/17 104/67   02/28/17 103/60   01/18/17 96/61    Weight from Last 3 Encounters:   04/10/17 77.1 kg (170 lb)   02/28/17 78 kg (172 lb)   01/10/17 76.2 kg (168 lb)              Today, you had the following     No orders found for display       Primary Care Provider Office Phone # Fax #    Robert Sher [phone]        PHYSICIANS [address] [facility] [address]  [facility] [address]        Thank you!     Thank you for choosing [facility] FOR LUNG SCIENCE AND HEALTH  for your care. Our goal is always to provide you with excellent care. Hearing back from our patients is one way we can continue to improve our services. Please take a few minutes to complete the written survey that you may receive in the mail after your visit with us. Thank you!             Your Updated Medication List - Protect others around you: Learn how to safely use, store and throw away your medicines at www.disposemymeds.org.          This list is accurate as of: 5/23/17 11:41 AM.  Always use your most recent med list.                   Brand Name Dispense Instructions for use    azelastine 0.1 % spray    ASTELIN    1 Bottle    Spray 1-2 sprays into both nostrils 2 times daily       CLARITIN PO      Take 10 mg by mouth as needed       fluticasone 50 MCG/ACT spray    FLONASE    1 Bottle    Spray 2 sprays into both nostrils daily       montelukast 10 MG tablet    SINGULAIR    30 tablet    Take 1 tablet (10 mg) by mouth every evening       vitamin D3 2000 UNITS Caps

## 2017-05-23 NOTE — NURSING NOTE
Patient came in for weekly Allergy injection. Patient had no reaction to last weeks shots. I gave 0.35 ml of the red bottle sub cutaneous without any issues.   Kishor Holliday CMA at 9:30 AM on 5/23/2017   Brett Orosco comes into clinic today at the request of Robert Sher for allergy injections    No reaction to last injection.    This service provided today was under the direct supervision of Dr. Rivera, who was available if needed.    Kishor Holliday, CMA

## 2017-05-26 ENCOUNTER — ALLIED HEALTH/NURSE VISIT (OUTPATIENT)
Dept: PULMONOLOGY | Facility: CLINIC | Age: 27
End: 2017-05-26
Attending: ALLERGY & IMMUNOLOGY
Payer: COMMERCIAL

## 2017-05-26 DIAGNOSIS — Z51.6 NEED FOR DESENSITIZATION TO ALLERGENS: Primary | ICD-10-CM

## 2017-05-26 PROCEDURE — 95117 IMMUNOTHERAPY INJECTIONS: CPT | Mod: ZF

## 2017-05-26 NOTE — MR AVS SNAPSHOT
After Visit Summary   5/26/2017    Brett Orosco    MRN: [id number]           Patient Information     Date Of Birth          1990        Visit Information        Provider Department      5/26/2017 9:30 AM [facility] NURSE [facility]         Follow-ups after your visit        Your next 10 appointments already scheduled     May 26, 2017  9:30 AM CDT   Nurse Visit with [facility] NURSE   [facility] ([facility] [place])    [address]  [address] [facility] [address] [phone]              Who to contact     If you have questions or need follow up information about today's clinic visit or your schedule please contact [facility] LUNG SCIENCE AND HEALTH directly at [phone].  Normal or non-critical lab and imaging results will be communicated to you by Ahalogyhart, letter or phone within 4 business days after the clinic has received the results. If you do not hear from us within 7 days, please contact the clinic through Ahalogyhart or phone. If you have a critical or abnormal lab result, we will notify you by phone as soon as possible.  Submit refill requests through NPS or call your pharmacy and they will forward the refill request to us. Please allow 3 business days for your refill to be completed.          Additional Information About Your Visit        MyChart Information     NPS gives you secure access to your electronic health record. If you see a primary care provider, you can also send messages to your care team and make appointments. If you have questions, please call your primary care clinic.  If you do not have a primary care provider, please call [phone] and they will assist you.        Care EveryWhere ID     This is your Care EveryWhere ID. This could be used by other organizations to access your [place] medical records  [id number]         Blood Pressure from Last 3  Encounters:   04/10/17 104/67   02/28/17 103/60   01/18/17 96/61    Weight from Last 3 Encounters:   04/10/17 77.1 kg (170 lb)   02/28/17 78 kg (172 lb)   01/10/17 76.2 kg (168 lb)              Today, you had the following     No orders found for display       Primary Care Provider Office Phone # Fax #    Robert Sher [phone]        PHYSICIANS [address] [facility] [address]  [facility] [address]        Thank you!     Thank you for choosing [facility] FOR LUNG SCIENCE AND HEALTH  for your care. Our goal is always to provide you with excellent care. Hearing back from our patients is one way we can continue to improve our services. Please take a few minutes to complete the written survey that you may receive in the mail after your visit with us. Thank you!             Your Updated Medication List - Protect others around you: Learn how to safely use, store and throw away your medicines at www.disposemymeds.org.          This list is accurate as of: 5/26/17  9:02 AM.  Always use your most recent med list.                   Brand Name Dispense Instructions for use    azelastine 0.1 % spray    ASTELIN    1 Bottle    Spray 1-2 sprays into both nostrils 2 times daily       CLARITIN PO      Take 10 mg by mouth as needed       fluticasone 50 MCG/ACT spray    FLONASE    1 Bottle    Spray 2 sprays into both nostrils daily       montelukast 10 MG tablet    SINGULAIR    30 tablet    Take 1 tablet (10 mg) by mouth every evening       vitamin D3 2000 UNITS Caps

## 2017-05-26 NOTE — NURSING NOTE
Patient came in for weekly Allergy injection. Patient had no reaction to last weeks shots. I gave 0.4 ml of the red bottle sub cutaneous without any issues.   Brett Orosco comes into clinic today at the request of Robert Sher for allergy injections    No reaction to last injection.    This service provided today was under the direct supervision of Dr. Rivera, who was available if needed.    Kishor Holliday, OLIVIER Holliday CMA at 9:28 AM on 5/26/2017

## 2017-05-30 ENCOUNTER — ALLIED HEALTH/NURSE VISIT (OUTPATIENT)
Dept: PULMONOLOGY | Facility: CLINIC | Age: 27
End: 2017-05-30
Attending: ALLERGY & IMMUNOLOGY
Payer: COMMERCIAL

## 2017-05-30 DIAGNOSIS — Z51.6 NEED FOR DESENSITIZATION TO ALLERGENS: Primary | ICD-10-CM

## 2017-05-30 PROCEDURE — 95117 IMMUNOTHERAPY INJECTIONS: CPT | Mod: ZF

## 2017-05-30 NOTE — MR AVS SNAPSHOT
After Visit Summary   5/30/2017    Brett Orosco    MRN: [id number]           Patient Information     Date Of Birth          1990        Visit Information        Provider Department      5/30/2017 12:30 PM [facility] NURSE [facility] Lung Science and Health        Today's Diagnoses     Need for desensitization to allergens    -  1       Follow-ups after your visit        Who to contact     If you have questions or need follow up information about today's clinic visit or your schedule please contact [facility] SCIENCE AND HEALTH directly at [phone].  Normal or non-critical lab and imaging results will be communicated to you by Formspringhart, letter or phone within 4 business days after the clinic has received the results. If you do not hear from us within 7 days, please contact the clinic through [employer]t or phone. If you have a critical or abnormal lab result, we will notify you by phone as soon as possible.  Submit refill requests through [email or web address] or call your pharmacy and they will forward the refill request to us. Please allow 3 business days for your refill to be completed.          Additional Information About Your Visit        MyChart Information     [email or web address] gives you secure access to your electronic health record. If you see a primary care provider, you can also send messages to your care team and make appointments. If you have questions, please call your primary care clinic.  If you do not have a primary care provider, please call [phone] and they will assist you.        Care EveryWhere ID     This is your Care EveryWhere ID. This could be used by other organizations to access your [place] medical records  [id number]         Blood Pressure from Last 3 Encounters:   04/10/17 104/67   02/28/17 103/60   01/18/17 96/61    Weight from Last 3 Encounters:   04/10/17 77.1 kg (170 lb)   02/28/17 78 kg (172 lb)   01/10/17 76.2 kg (168 lb)              Today, you had the  following     No orders found for display       Primary Care Provider Office Phone # Fax #    Robert Sher [phone]        PHYSICIANS [address] [facility] [address]  [facility] [address]        Thank you!     Thank you for choosing [facility] LUNG SCIENCE AND HEALTH  for your care. Our goal is always to provide you with excellent care. Hearing back from our patients is one way we can continue to improve our services. Please take a few minutes to complete the written survey that you may receive in the mail after your visit with us. Thank you!             Your Updated Medication List - Protect others around you: Learn how to safely use, store and throw away your medicines at www.disposemymeds.org.          This list is accurate as of: 5/30/17  1:03 PM.  Always use your most recent med list.                   Brand Name Dispense Instructions for use    azelastine 0.1 % spray    ASTELIN    1 Bottle    Spray 1-2 sprays into both nostrils 2 times daily       CLARITIN PO      Take 10 mg by mouth as needed       fluticasone 50 MCG/ACT spray    FLONASE    1 Bottle    Spray 2 sprays into both nostrils daily       montelukast 10 MG tablet    SINGULAIR    30 tablet    Take 1 tablet (10 mg) by mouth every evening       vitamin D3 2000 UNITS Caps

## 2017-05-30 NOTE — NURSING NOTE
Patient came in for weekly Allergy injection. Patient had no reaction to last weeks shots. I gave 0.45 ml of the red bottle sub cutaneous without any issues. Brett Orosco comes into clinic today at the request of [facility] for allergy injections    No reaction to last injection.    This service provided today was under the direct supervision of Dr. Rivera, who was available if needed.    Kishor Holliday, OLIVIER Holliday CMA at 12:46 PM on 5/30/2017

## 2017-06-06 ENCOUNTER — ALLIED HEALTH/NURSE VISIT (OUTPATIENT)
Dept: PULMONOLOGY | Facility: CLINIC | Age: 27
End: 2017-06-06
Payer: COMMERCIAL

## 2017-06-06 DIAGNOSIS — Z51.6 NEED FOR DESENSITIZATION TO ALLERGENS: Primary | ICD-10-CM

## 2017-06-06 PROCEDURE — 95117 IMMUNOTHERAPY INJECTIONS: CPT | Mod: ZF

## 2017-06-06 NOTE — NURSING NOTE
Patient came in for weekly Allergy injection. Patient had no reaction to last weeks shots. I gave 0.5 ml of the red bottle sub cutaneous without any issues.   Brett Orosco comes into clinic today at the request of [facility] for allergy injections    No reaction to last injection.    This service provided today was under the direct supervision of Dr. Rivera, who was available if needed.    Kishor Holliday, OLIVIER Holliday CMA at 10:59 AM on 6/6/2017

## 2017-06-06 NOTE — MR AVS SNAPSHOT
After Visit Summary   6/6/2017    Brett Orosco    MRN: [id number]           Patient Information     Date Of Birth          1990        Visit Information        Provider Department      6/6/2017 10:00 AM [facility] NURSE [facility]         Follow-ups after your visit        Your next 10 appointments already scheduled     Jun 06, 2017 10:00 AM CDT   Nurse Visit with [facility] NURSE   [facility] ([facility])    [address]  [address] [facility] [address]   [phone]            Jun 09, 2017  9:00 AM CDT   Nurse Visit with [facility] NURSE   [facility] for [facility] Science and Health ([facility])    [address]  [address] [facility] [address]   [phone]            Jun 12, 2017  9:00 AM CDT   Nurse Visit with [facility] NURSE   [facility] ([facility])    [address]  [address] [facility] [address]   [phone]            Jun 16, 2017  9:30 AM CDT   Nurse Visit with [facility] NURSE   [facility] for [facility] ([facility])    [address]  [address] [facility] [address]   [phone]            Jun 19, 2017  9:00 AM CDT   Nurse Visit with [facility] NURSE   [facility] for [facility] ([facility])    [address]  [address] [facility] [address]   [phone]            Jun 23, 2017  9:00 AM CDT   Nurse Visit with [facility] NURSE   [facility] for [facility] ([facility])    [address]  [address] [facility] [address]   [phone]              Who to contact     If you have questions or need follow up information about today's clinic visit or your schedule please contact [facility] FOR LUNG SCIENCE AND HEALTH directly at  [phone].  Normal or non-critical lab and imaging results will be communicated to you by 2Peer (Qlipso)hart, letter or phone within 4 business days after the clinic has received the results. If you do not hear from us within 7 days, please contact the clinic through 2Peer (Qlipso)hart or phone. If you have a critical or abnormal lab result, we will notify you by phone as soon as possible.  Submit refill requests through adQ or call your pharmacy and they will forward the refill request to us. Please allow 3 business days for your refill to be completed.          Additional Information About Your Visit        2Peer (Qlipso)hart Information     adQ gives you secure access to your electronic health record. If you see a primary care provider, you can also send messages to your care team and make appointments. If you have questions, please call your primary care clinic.  If you do not have a primary care provider, please call [phone] and they will assist you.        Care EveryWhere ID     This is your Care EveryWhere ID. This could be used by other organizations to access your [facility] medical records  [id number]         Blood Pressure from Last 3 Encounters:   04/10/17 104/67   02/28/17 103/60   01/18/17 96/61    Weight from Last 3 Encounters:   04/10/17 77.1 kg (170 lb)   02/28/17 78 kg (172 lb)   01/10/17 76.2 kg (168 lb)              Today, you had the following     No orders found for display       Primary Care Provider Office Phone # Fax #    Robert Sher [phone]        PHYSICIANS [address] [place] [address] [address]  [facility] [address]        Thank you!     Thank you for choosing [facility] FOR LUNG SCIENCE AND HEALTH  for your care. Our goal is always to provide you with excellent care. Hearing back from our patients is one way we can continue to improve our services. Please take a few minutes to complete the written survey that you may receive in the mail after your visit with us. Thank you!              Your Updated Medication List - Protect others around you: Learn how to safely use, store and throw away your medicines at www.disposemymeds.org.          This list is accurate as of: 6/6/17  9:52 AM.  Always use your most recent med list.                   Brand Name Dispense Instructions for use    azelastine 0.1 % spray    ASTELIN    1 Bottle    Spray 1-2 sprays into both nostrils 2 times daily       CLARITIN PO      Take 10 mg by mouth as needed       fluticasone 50 MCG/ACT spray    FLONASE    1 Bottle    Spray 2 sprays into both nostrils daily       montelukast 10 MG tablet    SINGULAIR    30 tablet    Take 1 tablet (10 mg) by mouth every evening       vitamin D3 2000 UNITS Caps

## 2017-06-09 ENCOUNTER — ALLIED HEALTH/NURSE VISIT (OUTPATIENT)
Dept: PULMONOLOGY | Facility: CLINIC | Age: 27
End: 2017-06-09
Attending: ALLERGY & IMMUNOLOGY
Payer: COMMERCIAL

## 2017-06-09 DIAGNOSIS — Z51.6 NEED FOR DESENSITIZATION TO ALLERGENS: Primary | ICD-10-CM

## 2017-06-09 PROCEDURE — 95117 IMMUNOTHERAPY INJECTIONS: CPT | Mod: ZF

## 2017-06-09 RX ORDER — BENZOCAINE/MENTHOL 6 MG-10 MG
LOZENGE MUCOUS MEMBRANE 2 TIMES DAILY
Status: DISCONTINUED | OUTPATIENT
Start: 2017-06-09 | End: 2018-08-17

## 2017-06-09 RX ORDER — CETIRIZINE HYDROCHLORIDE 10 MG/1
10 TABLET ORAL ONCE
Status: DISCONTINUED | OUTPATIENT
Start: 2017-06-09 | End: 2019-02-25

## 2017-06-09 NOTE — NURSING NOTE
Chief Complaint   Patient presents with     Allergy Injection     Pt is here for allergy injection. 0.6 ml red top x 3 given. Pt waited in lobby. Pt given hydrocortisone cream at injection sites as well as a cirtirizine tablet.      Teresita Perez  CMA at 11:26 AM on 6/9/2017

## 2017-06-09 NOTE — MR AVS SNAPSHOT
After Visit Summary   6/9/2017    Brett Orosco    MRN: [id number]           Patient Information     Date Of Birth          1990        Visit Information        Provider Department      6/9/2017 9:00 AM [facility] NURSE [facility] Lung Science and Health        Today's Diagnoses     Need for desensitization to allergens    -  1       Follow-ups after your visit        Your next 10 appointments already scheduled     Jun 12, 2017  9:00 AM CDT   Nurse Visit with [facility] NURSE   [facility] Science [facility] Health ([facility] [place])    [address] [address] [phone]            Jun 16, 2017  9:30 AM CDT   Nurse Visit with [facility] NURSE   [facility] ([facility] [place])    [address] [address] [phone]            Jun 19, 2017  9:00 AM CDT   Nurse Visit with [facility] NURSE   [facility] ([facility] [place])    [address] [place] [address] [phone]            Jun 23, 2017  9:00 AM CDT   Nurse Visit with [facility] NURSE   [facility] ([facility] [place])    [address] [address] [phone]              Who to contact     If you have questions or need follow up information about today's clinic visit or your schedule please contact [facility] LUNG SCIENCE AND HEALTH directly at [phone].  Normal or non-critical lab and imaging results will be communicated to you by MyChart, letter or phone within 4 business days after the clinic has received the results. If you do not hear from us within 7 days, please contact the clinic through MyChart or phone. If you have a critical or abnormal lab result, we will notify you by phone as soon as possible.  Submit refill  requests through Fitnet or call your pharmacy and they will forward the refill request to us. Please allow 3 business days for your refill to be completed.          Additional Information About Your Visit        Vumanity Mediahart Information     Fitnet gives you secure access to your electronic health record. If you see a primary care provider, you can also send messages to your care team and make appointments. If you have questions, please call your primary care clinic.  If you do not have a primary care provider, please call [phone] and they will assist you.        Care EveryWhere ID     This is your Care EveryWhere ID. This could be used by other organizations to access your [place] medical records  [id number]         Blood Pressure from Last 3 Encounters:   04/10/17 104/67   02/28/17 103/60   01/18/17 96/61    Weight from Last 3 Encounters:   04/10/17 77.1 kg (170 lb)   02/28/17 78 kg (172 lb)   01/10/17 76.2 kg (168 lb)              Today, you had the following     No orders found for display       Primary Care Provider Office Phone # Fax #    Robert Sher [phone]        PHYSICIANS [address] [facility] [address]  [facility] [address]        Thank you!     Thank you for choosing [facility] FOR LUNG SCIENCE AND HEALTH  for your care. Our goal is always to provide you with excellent care. Hearing back from our patients is one way we can continue to improve our services. Please take a few minutes to complete the written survey that you may receive in the mail after your visit with us. Thank you!             Your Updated Medication List - Protect others around you: Learn how to safely use, store and throw away your medicines at www.disposemymeds.org.          This list is accurate as of: 6/9/17 10:04 AM.  Always use your most recent med list.                   Brand Name Dispense Instructions for use    azelastine 0.1 % spray    ASTELIN    1 Bottle    Spray 1-2 sprays into both nostrils 2  times daily       CLARITIN PO      Take 10 mg by mouth as needed       fluticasone 50 MCG/ACT spray    FLONASE    1 Bottle    Spray 2 sprays into both nostrils daily       montelukast 10 MG tablet    SINGULAIR    30 tablet    Take 1 tablet (10 mg) by mouth every evening       vitamin D3 2000 UNITS Caps

## 2017-06-16 ENCOUNTER — ALLIED HEALTH/NURSE VISIT (OUTPATIENT)
Dept: PULMONOLOGY | Facility: CLINIC | Age: 27
End: 2017-06-16
Attending: ALLERGY & IMMUNOLOGY
Payer: COMMERCIAL

## 2017-06-16 DIAGNOSIS — Z51.6 NEED FOR DESENSITIZATION TO ALLERGENS: Primary | ICD-10-CM

## 2017-06-16 PROCEDURE — 95117 IMMUNOTHERAPY INJECTIONS: CPT | Mod: ZF

## 2017-06-16 NOTE — NURSING NOTE
Chief Complaint   Patient presents with     Allergy Injection     Patient is here for allergy injection. 0.7 ml red top x 3 given. Pt waited in lobby. (left earlier then 30 min)      Teresita Perez CMA at 9:47 AM on 6/16/2017  Brett Jeramie Kwesi comes into clinic today at the request of [facility] for allergy injections    No reaction to last injection.    This service provided today was under the direct supervision of Dr. Lemus, who was available if needed.    Teresita Perez, CMA

## 2017-06-16 NOTE — MR AVS SNAPSHOT
After Visit Summary   6/16/2017    Brett Orosco    MRN: [id number]           Patient Information     Date Of Birth          1990        Visit Information        Provider Department      6/16/2017 9:30 AM [facility] NURSE [facility] Lung Science and Health        Today's Diagnoses     Need for desensitization to allergens    -  1       Follow-ups after your visit        Your next 10 appointments already scheduled     Jun 19, 2017  9:00 AM CDT   Nurse Visit with [facility] NURSE   [facility] Lung Science and Health ([facility] [place])    [address] [place] [address] [phone]            Jun 23, 2017  9:00 AM CDT   Nurse Visit with [facility] NURSE   [facility] Science and Health ([facility] [place])    [address] [place] [address] [phone]              Who to contact     If you have questions or need follow up information about today's clinic visit or your schedule please contact [facility] LUNG SCIENCE AND HEALTH directly at [phone].  Normal or non-critical lab and imaging results will be communicated to you by ARTA Biosciencehart, letter or phone within 4 business days after the clinic has received the results. If you do not hear from us within 7 days, please contact the clinic through [employer] or phone. If you have a critical or abnormal lab result, we will notify you by phone as soon as possible.  Submit refill requests through Sulfagenix or call your pharmacy and they will forward the refill request to us. Please allow 3 business days for your refill to be completed.          Additional Information About Your Visit        ARTA Biosciencehart Information     Sulfagenix gives you secure access to your electronic health record. If you see a primary care provider, you can also send messages to your care team and make appointments. If you have questions, please call your primary care  clinic.  If you do not have a primary care provider, please call [phone] and they will assist you.        Care EveryWhere ID     This is your Care EveryWhere ID. This could be used by other organizations to access your [place] medical records  [id number]         Blood Pressure from Last 3 Encounters:   04/10/17 104/67   02/28/17 103/60   01/18/17 96/61    Weight from Last 3 Encounters:   04/10/17 77.1 kg (170 lb)   02/28/17 78 kg (172 lb)   01/10/17 76.2 kg (168 lb)              Today, you had the following     No orders found for display       Primary Care Provider Office Phone # Fax #    Robert Sher [phone]        PHYSICIANS [address] [place] [address] [address]  [facility] [address]        Thank you!     Thank you for choosing [facility] FOR LUNG SCIENCE AND HEALTH  for your care. Our goal is always to provide you with excellent care. Hearing back from our patients is one way we can continue to improve our services. Please take a few minutes to complete the written survey that you may receive in the mail after your visit with us. Thank you!             Your Updated Medication List - Protect others around you: Learn how to safely use, store and throw away your medicines at www.disposemymeds.org.          This list is accurate as of: 6/16/17 10:12 AM.  Always use your most recent med list.                   Brand Name Dispense Instructions for use    azelastine 0.1 % spray    ASTELIN    1 Bottle    Spray 1-2 sprays into both nostrils 2 times daily       CLARITIN PO      Take 10 mg by mouth as needed       fluticasone 50 MCG/ACT spray    FLONASE    1 Bottle    Spray 2 sprays into both nostrils daily       montelukast 10 MG tablet    SINGULAIR    30 tablet    Take 1 tablet (10 mg) by mouth every evening       vitamin D3 2000 UNITS Caps

## 2017-06-23 ENCOUNTER — ALLIED HEALTH/NURSE VISIT (OUTPATIENT)
Dept: PULMONOLOGY | Facility: CLINIC | Age: 27
End: 2017-06-23
Attending: ALLERGY & IMMUNOLOGY
Payer: COMMERCIAL

## 2017-06-23 DIAGNOSIS — Z51.6 NEED FOR DESENSITIZATION TO ALLERGENS: Primary | ICD-10-CM

## 2017-06-23 PROCEDURE — 95117 IMMUNOTHERAPY INJECTIONS: CPT | Mod: ZF

## 2017-06-23 NOTE — NURSING NOTE
Patient came in for weekly Allergy injection. Patient had no reaction to last weeks shots. I gave 0.8 ml of the red bottle sub cutaneous without any issues.   Brett Orosco comes into clinic today at the request of [facility] for allergy injections    No reaction to last injection.    This service provided today was under the direct supervision of Dr. Rivera, who was available if needed.    Kishor Holliday, OLIVIER Holliday CMA at 10:29 AM on 6/23/2017

## 2017-06-27 ENCOUNTER — TELEPHONE (OUTPATIENT)
Dept: PULMONOLOGY | Facility: CLINIC | Age: 27
End: 2017-06-27

## 2017-06-27 NOTE — TELEPHONE ENCOUNTER
Called [facility] for refill of 3 red topped vials for allergy serums. Need in 2 weeks.

## 2017-06-30 ENCOUNTER — ALLIED HEALTH/NURSE VISIT (OUTPATIENT)
Dept: PULMONOLOGY | Facility: CLINIC | Age: 27
End: 2017-06-30
Payer: COMMERCIAL

## 2017-06-30 ENCOUNTER — TRANSFERRED RECORDS (OUTPATIENT)
Dept: HEALTH INFORMATION MANAGEMENT | Facility: CLINIC | Age: 27
End: 2017-06-30

## 2017-06-30 DIAGNOSIS — Z51.6 NEED FOR DESENSITIZATION TO ALLERGENS: Primary | ICD-10-CM

## 2017-06-30 PROCEDURE — 95117 IMMUNOTHERAPY INJECTIONS: CPT | Mod: ZF

## 2017-06-30 NOTE — NURSING NOTE
Chief Complaint   Patient presents with     Allergy Injection     Patient is here for allergy injections. 0.9 ml red top x 3. Pt waited in lobby for 30 minutes      Teresita Jerome CMA at 12:24 PM on 6/30/2017  Brett Orosco comes into clinic today at the request of [facility] for allergy injections    No reaction to last injection.    This service provided today was under the direct supervision of Dr. Bhatia, who was available if needed.    Teresita Jerome CMA

## 2017-06-30 NOTE — MR AVS SNAPSHOT
After Visit Summary   6/30/2017    Brett Orosco    MRN: [id number]           Patient Information     Date Of Birth          1990        Visit Information        Provider Department      6/30/2017 12:00 PM [facility] NURSE [facility]        Today's Diagnoses     Need for desensitization to allergens    -  1       Follow-ups after your visit        Who to contact     If you have questions or need follow up information about today's clinic visit or your schedule please contact [facility] directly at [phone].  Normal or non-critical lab and imaging results will be communicated to you by MSThart, letter or phone within 4 business days after the clinic has received the results. If you do not hear from us within 7 days, please contact the clinic through [employer] or phone. If you have a critical or abnormal lab result, we will notify you by phone as soon as possible.  Submit refill requests through NewsMaven or call your pharmacy and they will forward the refill request to us. Please allow 3 business days for your refill to be completed.          Additional Information About Your Visit        MyChart Information     NewsMaven gives you secure access to your electronic health record. If you see a primary care provider, you can also send messages to your care team and make appointments. If you have questions, please call your primary care clinic.  If you do not have a primary care provider, please call [phone] and they will assist you.        Care EveryWhere ID     This is your Care EveryWhere ID. This could be used by other organizations to access your [place] medical records  [id number]         Blood Pressure from Last 3 Encounters:   04/10/17 104/67   02/28/17 103/60   01/18/17 96/61    Weight from Last 3 Encounters:   04/10/17 77.1 kg (170 lb)   02/28/17 78 kg (172 lb)   01/10/17 76.2 kg (168 lb)              Today, you had the  following     No orders found for display       Primary Care Provider Office Phone # Fax #    Robert Sher [phone]       [address] [address]        Equal Access to Services     HUNTER TORRES : Hadii sheri rogers michaelao Soomaali, waaxda luqadaha, qaybta kaalmada ademichelleyada, baldomero adryin hayaaaubree treviñomichelle hahn cristobal vivas. So [facility] [phone].    ATENCIÓN: Si habla español, tiene a marquez disposición servicios gratuitos de asistencia lingüística. Llame al [phone].    We comply with applicable federal civil rights laws and [place] laws. We do not discriminate on the basis of race, color, national origin, age, disability sex, sexual orientation or gender identity.            Thank you!     Thank you for choosing [facility] FOR [facility]ENCE [facility]  for your care. Our goal is always to provide you with excellent care. Hearing back from our patients is one way we can continue to improve our services. Please take a few minutes to complete the written survey that you may receive in the mail after your visit with us. Thank you!             Your Updated Medication List - Protect others around you: Learn how to safely use, store and throw away your medicines at www.disposemymeds.org.          This list is accurate as of: 6/30/17  1:03 PM.  Always use your most recent med list.                   Brand Name Dispense Instructions for use Diagnosis    azelastine 0.1 % spray    ASTELIN    1 Bottle    Spray 1-2 sprays into both nostrils 2 times daily    Seasonal allergic rhinitis due to pollen       CLARITIN PO      Take 10 mg by mouth as needed        fluticasone 50 MCG/ACT spray    FLONASE    1 Bottle    Spray 2 sprays into both nostrils daily        montelukast 10 MG tablet    SINGULAIR    30 tablet    Take 1 tablet (10 mg) by mouth every evening    Seasonal allergic rhinitis due to pollen       vitamin D3 2000 UNITS Caps

## 2017-07-10 DIAGNOSIS — T78.40XA ALLERGIC REACTION: ICD-10-CM

## 2017-07-10 DIAGNOSIS — Z51.6 NEED FOR DESENSITIZATION TO ALLERGENS: Primary | ICD-10-CM

## 2017-07-10 DIAGNOSIS — J30.1 CHRONIC SEASONAL ALLERGIC RHINITIS DUE TO POLLEN: ICD-10-CM

## 2017-08-01 ENCOUNTER — TELEPHONE (OUTPATIENT)
Dept: PULMONOLOGY | Facility: CLINIC | Age: 27
End: 2017-08-01

## 2017-08-01 NOTE — TELEPHONE ENCOUNTER
Patient's previous allergy clinic at  was going to make refill serums for patient but have since contacted clinic on several occasion stating that patient needs to make return apt with them, they needed signed form from patient(which they had received)  and that our documentation was not consistent with their's. Unfortunately these roadblocks to filling serums, on their end, came long after they had said they would refill the serums. Contacted  and requested serum prescription. Gave to Dr Rivera so he could place orders in patient's MAR. Will send to [facility] when orders in. Spoke with patient and apologized to patient for the delay and explained 's role in delay.

## 2017-08-07 ENCOUNTER — DOCUMENTATION ONLY (OUTPATIENT)
Dept: ALLERGY | Facility: CLINIC | Age: 27
End: 2017-08-07

## 2017-08-07 DIAGNOSIS — J30.2 CHRONIC SEASONAL ALLERGIC RHINITIS DUE TO FUNGAL SPORES: ICD-10-CM

## 2017-08-07 DIAGNOSIS — J30.1 CHRONIC SEASONAL ALLERGIC RHINITIS DUE TO POLLEN: Primary | ICD-10-CM

## 2017-08-07 NOTE — PROGRESS NOTES
We will prepare his allergy solutions:  Vial A:  Renato Grass 0.2 cc, Pilo 0.3, Birch 0.3, Elm 0.3, Skytop 0.3, Maple 0.3, Oak 0.1,   Vial B: Cladosporium 0.2 cc.

## 2017-09-11 ENCOUNTER — TELEPHONE (OUTPATIENT)
Dept: ALLERGY | Facility: CLINIC | Age: 27
End: 2017-09-11

## 2017-09-11 ENCOUNTER — TELEPHONE (OUTPATIENT)
Dept: PULMONOLOGY | Facility: CLINIC | Age: 27
End: 2017-09-11

## 2017-09-11 DIAGNOSIS — J30.81 CHRONIC ALLERGIC RHINITIS DUE TO ANIMAL HAIR AND DANDER: Primary | ICD-10-CM

## 2017-09-11 NOTE — TELEPHONE ENCOUNTER
After much back and forth with serum production, have ordered all appropriate serums for patient. Patient had last dose on 6/30/17. Dr Rivera has requested patient to have .05 ml red vial for next dose with top dose being .5 ml red vial. No yellow vials needed per Dr Rivera. Other serums ordered and are here. Emailed missing cat and mold serum order to compound pharm. Will call patient when new serums arrive.

## 2017-09-22 ENCOUNTER — TELEPHONE (OUTPATIENT)
Dept: PULMONOLOGY | Facility: CLINIC | Age: 27
End: 2017-09-22

## 2017-09-22 NOTE — TELEPHONE ENCOUNTER
Spoke with patient and he will come to clinic Mon(9/25/17) to have allergy injection at 8:30 or 9 am. All serums have finally arrived.

## 2017-09-25 ENCOUNTER — ALLIED HEALTH/NURSE VISIT (OUTPATIENT)
Dept: PULMONOLOGY | Facility: CLINIC | Age: 27
End: 2017-09-25
Attending: ALLERGY & IMMUNOLOGY
Payer: COMMERCIAL

## 2017-09-25 DIAGNOSIS — Z51.6 NEED FOR DESENSITIZATION TO ALLERGENS: Primary | ICD-10-CM

## 2017-09-25 PROCEDURE — 95117 IMMUNOTHERAPY INJECTIONS: CPT | Mod: ZF

## 2017-09-25 NOTE — MR AVS SNAPSHOT
After Visit Summary   9/25/2017    Brett Orosco    MRN: [id number]           Patient Information     Date Of Birth          1990        Visit Information        Provider Department      9/25/2017 8:30 AM [facility] NURSE [facility]         Follow-ups after your visit        Your next 10 appointments already scheduled     Sep 29, 2017  8:30 AM CDT   Nurse Visit with [facility] NURSE   [facility] ([facility])    [address] [place] [id number] [phone]            Oct 02, 2017  8:30 AM CDT   Nurse Visit with [facility] NURSE   [facility] ([facility])    [address] [place] [id number] [phone]            Oct 06, 2017  8:30 AM CDT   Nurse Visit with [facility] NURSE   [facility] ([facility])    [address] [place] [id number] [phone]              Who to contact     If you have questions or need follow up information about today's clinic visit or your schedule please contact [facility] L[facility] SCIENCE [facility] HE[facility] directly at [phone].  Normal or non-critical lab and imaging results will be communicated to you by Re-Sec Technologieshart, letter or phone within 4 business days after the clinic has received the results. If you do not hear from us within 7 days, please contact the clinic through [employer] or phone. If you have a critical or abnormal lab result, we will notify you by phone as soon as possible.  Submit refill requests through FreshOffice or call your pharmacy and they will forward the refill request to us. Please allow 3 business days for your refill to be completed.          Additional Information About Your Visit        Re-[employer]vy[employer] for Parents Information     FreshOffice gives you secure access to your electronic health record.  If you see a primary care provider, you can also send messages to your care team and make appointments. If you have questions, please call your primary care clinic.  If you do not have a primary care provider, please call [phone] and they will assist you.        Care EveryWhere ID     This is your Care EveryWhere ID. This could be used by other organizations to access your [place] medical records  [id number]         Blood Pressure from Last 3 Encounters:   04/10/17 104/67   02/28/17 103/60   01/18/17 96/61    Weight from Last 3 Encounters:   04/10/17 77.1 kg (170 lb)   02/28/17 78 kg (172 lb)   01/10/17 76.2 kg (168 lb)              Today, you had the following     No orders found for display       Primary Care Provider Office Phone # Fax #    Robert Sher [phone] [address] [facility] [address]        Equal Access to Services     [facility]: Hadii aad ku hadasho Soomaali, waaxda luqadaha, qaybta kaalmada adeegyada, waxay idiin hayethann naida jain . So [facility] [phone].    ATENCIÓN: Si habla español, tiene a marquez disposición servicios gratuitos de asistencia lingüística. Llame al [phone].    We comply with applicable federal civil rights laws and [place] laws. We do not discriminate on the basis of race, color, national origin, age, disability sex, sexual orientation or gender identity.            Thank you!     Thank you for choosing [facility] FOR LUNG SCIENCE AND HEALTH  for your care. Our goal is always to provide you with excellent care. Hearing back from our patients is one way we can continue to improve our services. Please take a few minutes to complete the written survey that you may receive in the mail after your visit with us. Thank you!             Your Updated Medication List - Protect others around you: Learn how to safely use, store and throw away your medicines at www.disposemymeds.org.          This list is accurate as of:  9/25/17  9:53 AM.  Always use your most recent med list.                   Brand Name Dispense Instructions for use Diagnosis    * ALLERGEN IMMUNOTHERAPY PRESCRIPTION     5 mL    Name of Mix: Mix #1  Grass, Tree  Pilo, White  GLY 1:20 w/v, HS  0.3 ml Birch Mix GLY 1:20 w/v, HS  0.3 ml Elm, American GLY 1:20 w/v, HS  0.3 ml [place], Shagbark GLY 1:20 w/v, HS  0.3 ml Maple, Hard/Sugar GLY 1:20 w/v, HS  0.3 ml Oak Mix RVW GLY 1:20 w/v, HS 0.1 ml Renato (std) 100,000 BAU/mL Gly, HS 0.2 ml Diluent: HSA qs to 5ml    Chronic seasonal allergic rhinitis due to pollen       * ALLERGEN IMMUNOTHERAPY PRESCRIPTION     5 mL    Name of Mix: Mix #1  Mold Cladosporium Fpbiqnnyfzalct93,000 pnu/mL (1:20 w/v), ALK  0.2 ml Diluent: HSA qs to 5ml    Chronic seasonal allergic rhinitis due to fungal spores       * ALLERGEN IMMUNOTHERAPY PRESCRIPTION     5 mL    Name of Mix: Mix #3  Dust Mite, Dog, Weeds Dog Hair-Dander, A. P.  1:100 w/v, HS  0.3 ml Dust Mites DF. 10,000 AU/mL, HS  0.5 ml  Lamb's Quarters 1:20 w/v, HS 0.3 ml Nettle 1:20 w/v, HS 0.3 ml Pigweed, Rough Redroot 1:20 w/v, HS 0.3 ml Ragweed, Mix (Giant, Short) 1:20 w/v, HS 0.3 ml Sagebrush, Mugwort 1:20 w/v, HS 0.3 ml Sorrel, Sheep 1:20 w/v, HS 0.3 ml Diluent: HSA qs to 5ml    Chronic seasonal allergic rhinitis due to pollen, Need for desensitization to allergens       * ALLERGEN IMMUNOTHERAPY PRESCRIPTION     5 mL    Name of Mix: Mix #4  Mold, Cat Cat Hair, Standardized 10,000 BAU/mL, ALK  1.0 ml  Alternaria 10,000 pnu/mL (1:20 w/v), ALK  0.2 ml Diluent: HSA qs to 5ml    Chronic allergic rhinitis due to animal hair and dander       azelastine 0.1 % spray    ASTELIN    1 Bottle    Spray 1-2 sprays into both nostrils 2 times daily    Seasonal allergic rhinitis due to pollen       CLARITIN PO      Take 10 mg by mouth as needed        COMPOUNDED NON-CONTROLLED SUBSTANCE - PHARMACY TO MIX COMPOUNDED MEDICATION    CMPD RX    3 vial    Per MD instruction    Need for desensitization to  allergens       fluticasone 50 MCG/ACT spray    FLONASE    1 Bottle    Spray 2 sprays into both nostrils daily        montelukast 10 MG tablet    SINGULAIR    30 tablet    Take 1 tablet (10 mg) by mouth every evening    Seasonal allergic rhinitis due to pollen       vitamin D3 2000 UNITS Caps           * Notice:  This list has 4 medication(s) that are the same as other medications prescribed for you. Read the directions carefully, and ask your doctor or other care provider to review them with you.

## 2017-09-25 NOTE — NURSING NOTE
Chief Complaint   Patient presents with     Allergy Injection     Patient is here for allergy injections. 0.05 ml red top x 4 given. Pt waited in lobby for 30 minutes      Teresita Perez  CMA at 10:50 AM on 9/25/2017

## 2017-09-29 ENCOUNTER — ALLIED HEALTH/NURSE VISIT (OUTPATIENT)
Dept: PULMONOLOGY | Facility: CLINIC | Age: 27
End: 2017-09-29
Attending: ALLERGY & IMMUNOLOGY
Payer: COMMERCIAL

## 2017-09-29 DIAGNOSIS — Z51.6 NEED FOR DESENSITIZATION TO ALLERGENS: Primary | ICD-10-CM

## 2017-09-29 PROCEDURE — 95117 IMMUNOTHERAPY INJECTIONS: CPT | Mod: ZF

## 2017-09-29 NOTE — NURSING NOTE
Patient came in for weekly Allergy injection. Patient had no reaction to last weeks shots. I gave 0.1 ml of the red bottle sub cutaneous without any issues.   Brett Orosco comes into clinic today at the request of Charlie Rivera for allergy injections    No reaction to last injection.    This service provided today was under the direct supervision of Dr. Rivera, who was available if needed.    Kishor Holliday, OLIVIER Holliday CMA at 8:55 AM on 9/29/2017

## 2017-09-29 NOTE — MR AVS SNAPSHOT
After Visit Summary   9/29/2017    Brett Orosco    MRN: [id number]           Patient Information     Date Of Birth          1990        Visit Information        Provider Department      9/29/2017 8:30 AM [facility] NURSE [facility]         Follow-ups after your visit        Your next 10 appointments already scheduled     Oct 02, 2017  8:30 AM CDT   Nurse Visit with [facility] NURSE   [facility] ([facility] [place])    [address] [place] [id number] [phone]            Oct 06, 2017  8:30 AM CDT   Nurse Visit with [facility] NURSE   [facility] ([facility] [place])    [address] [address] [phone]              Who to contact     If you have questions or need follow up information about today's clinic visit or your schedule please contact [facility] L[facility] SCIENCE AND HE[facility] directly at [phone].  Normal or non-critical lab and imaging results will be communicated to you by Entredahart, letter or phone within 4 business days after the clinic has received the results. If you do not hear from us within 7 days, please contact the clinic through RTF Logict or phone. If you have a critical or abnormal lab result, we will notify you by phone as soon as possible.  Submit refill requests through [employer] or call your pharmacy and they will forward the refill request to us. Please allow 3 business days for your refill to be completed.          Additional Information About Your Visit        Entredahart Information     [employer] gives you secure access to your electronic health record. If you see a primary care provider, you can also send messages to your care team and make appointments. If you have questions, please call your primary care clinic.  If you do not have a primary care provider, please call  [phone] and they will assist you.        Care EveryWhere ID     This is your Care EveryWhere ID. This could be used by other organizations to access your [place] medical records  [id number]         Blood Pressure from Last 3 Encounters:   04/10/17 104/67   02/28/17 103/60   01/18/17 96/61    Weight from Last 3 Encounters:   04/10/17 77.1 kg (170 lb)   02/28/17 78 kg (172 lb)   01/10/17 76.2 kg (168 lb)              Today, you had the following     No orders found for display       Primary Care Provider Office Phone # Fax #    Robert Sher [phone]       [address] [facility] [address]        Equal Access to Services     HUNTER TORRES : Hadii sheri rogers hadasho Soomaali, waaxda luqadaha, qaybta kaalmada adeegyada, waxay valarie hayethann naida jain . So [facility] [phone].    ATENCIÓN: Si habla español, tiene a marquez disposición servicios gratuitos de asistencia lingüística. Llame al [phone].    We comply with applicable federal civil rights laws and [place] laws. We do not discriminate on the basis of race, color, national origin, age, disability sex, sexual orientation or gender identity.            Thank you!     Thank you for choosing [facility] FOR LUNG SCIENCE AND HEALTH  for your care. Our goal is always to provide you with excellent care. Hearing back from our patients is one way we can continue to improve our services. Please take a few minutes to complete the written survey that you may receive in the mail after your visit with us. Thank you!             Your Updated Medication List - Protect others around you: Learn how to safely use, store and throw away your medicines at www.disposemymeds.org.          This list is accurate as of: 9/29/17  8:50 AM.  Always use your most recent med list.                   Brand Name Dispense Instructions for use Diagnosis    * ALLERGEN IMMUNOTHERAPY PRESCRIPTION     5 mL    Name of Mix: Mix #1  Grass, Tree  Pilo, White   GLY 1:20 w/v, HS  0.3 ml Birch Mix GLY 1:20 w/v, HS  0.3 ml Elm, American GLY 1:20 w/v, HS  0.3 ml [place], Shagbark GLY 1:20 w/v, HS  0.3 ml Maple, Hard/Sugar GLY 1:20 w/v, HS  0.3 ml Oak Mix RVW GLY 1:20 w/v, HS 0.1 ml Renato (std) 100,000 BAU/mL Gly, HS 0.2 ml Diluent: HSA qs to 5ml    Chronic seasonal allergic rhinitis due to pollen       * ALLERGEN IMMUNOTHERAPY PRESCRIPTION     5 mL    Name of Mix: Mix #1  Mold Cladosporium Dinsikdyeazzoa64,000 pnu/mL (1:20 w/v), ALK  0.2 ml Diluent: HSA qs to 5ml    Chronic seasonal allergic rhinitis due to fungal spores       * ALLERGEN IMMUNOTHERAPY PRESCRIPTION     5 mL    Name of Mix: Mix #3  Dust Mite, Dog, Weeds Dog Hair-Dander, A. P.  1:100 w/v, HS  0.3 ml Dust Mites DF. 10,000 AU/mL, HS  0.5 ml  Lamb's Quarters 1:20 w/v, HS 0.3 ml Nettle 1:20 w/v, HS 0.3 ml Pigweed, Rough Redroot 1:20 w/v, HS 0.3 ml Ragweed, Mix (Giant, Short) 1:20 w/v, HS 0.3 ml Sagebrush, Mugwort 1:20 w/v, HS 0.3 ml Sorrel, Sheep 1:20 w/v, HS 0.3 ml Diluent: HSA qs to 5ml    Chronic seasonal allergic rhinitis due to pollen, Need for desensitization to allergens       * ALLERGEN IMMUNOTHERAPY PRESCRIPTION     5 mL    Name of Mix: Mix #4  Mold, Cat Cat Hair, Standardized 10,000 BAU/mL, ALK  1.0 ml  Alternaria 10,000 pnu/mL (1:20 w/v), ALK  0.2 ml Diluent: HSA qs to 5ml    Chronic allergic rhinitis due to animal hair and dander       azelastine 0.1 % spray    ASTELIN    1 Bottle    Spray 1-2 sprays into both nostrils 2 times daily    Seasonal allergic rhinitis due to pollen       CLARITIN PO      Take 10 mg by mouth as needed        COMPOUNDED NON-CONTROLLED SUBSTANCE - PHARMACY TO MIX COMPOUNDED MEDICATION    CMPD RX    3 vial    Per MD instruction    Need for desensitization to allergens       fluticasone 50 MCG/ACT spray    FLONASE    1 Bottle    Spray 2 sprays into both nostrils daily        montelukast 10 MG tablet    SINGULAIR    30 tablet    Take 1 tablet (10 mg) by mouth every evening     Seasonal allergic rhinitis due to pollen       vitamin D3 2000 UNITS Caps           * Notice:  This list has 4 medication(s) that are the same as other medications prescribed for you. Read the directions carefully, and ask your doctor or other care provider to review them with you.

## 2017-10-06 ENCOUNTER — ALLIED HEALTH/NURSE VISIT (OUTPATIENT)
Dept: PULMONOLOGY | Facility: CLINIC | Age: 27
End: 2017-10-06
Attending: ALLERGY & IMMUNOLOGY
Payer: COMMERCIAL

## 2017-10-06 DIAGNOSIS — Z51.6 NEED FOR DESENSITIZATION TO ALLERGENS: Primary | ICD-10-CM

## 2017-10-06 PROCEDURE — 95117 IMMUNOTHERAPY INJECTIONS: CPT | Mod: ZF

## 2017-10-06 NOTE — NURSING NOTE
Patient came in for weekly Allergy injection. Patient had no reaction to last weeks shots. I gave 0.2 ml of the bottle sub cutaneous without any issues. Brett Orosco comes into clinic today at the request of Charlie Rivera for allergy injections    No reaction to last injection.    This service provided today was under the direct supervision of Dr. Rivera, who was available if needed.    Kishor Holliday, OLIVIER Holliday CMA at 12:35 PM on 10/6/2017

## 2017-10-06 NOTE — MR AVS SNAPSHOT
After Visit Summary   10/6/2017    Brett Orosco    MRN: [id number]           Patient Information     Date Of Birth          1990        Visit Information        Provider Department      10/6/2017 8:30 AM [facility] NURSE [facility] for Lung Science and Health         Follow-ups after your visit        Who to contact     If you have questions or need follow up information about today's clinic visit or your schedule please contact [facility] LUNG SCIENCE AND HEALTH directly at [phone].  Normal or non-critical lab and imaging results will be communicated to you by [employer], letter or phone within 4 business days after the clinic has received the results. If you do not hear from us within 7 days, please contact the clinic through [employer] or phone. If you have a critical or abnormal lab result, we will notify you by phone as soon as possible.  Submit refill requests through [employer] or call your pharmacy and they will forward the refill request to us. Please allow 3 business days for your refill to be completed.          Additional Information About Your Visit        MyChart Information     [employer] gives you secure access to your electronic health record. If you see a primary care provider, you can also send messages to your care team and make appointments. If you have questions, please call your primary care clinic.  If you do not have a primary care provider, please call [phone] and they will assist you.        Care EveryWhere ID     This is your Care EveryWhere ID. This could be used by other organizations to access your [place] medical records  [id number]         Blood Pressure from Last 3 Encounters:   04/10/17 104/67   02/28/17 103/60   01/18/17 96/61    Weight from Last 3 Encounters:   04/10/17 77.1 kg (170 lb)   02/28/17 78 kg (172 lb)   01/10/17 76.2 kg (168 lb)              Today, you had the following     No orders found for display       Primary Care Provider  Office Phone # Fax #    Robert Sher [phone] [address] [facility] [address]        Equal Access to Services     HUNTER TORRES : Hadpro sheri rogers michaelao Sovioletaali, waaxda luqadaha, qaybta kaalmada ademichelleyada, baldomero castillo hudsonmichelle hahn laRicklori vivas. So [facility] [phone].    ATENCIÓN: Si darwinla español, tiene a marquez disposición servicios gratuitos de asistencia lingüística. Llame al [phone].    We comply with applicable federal civil rights laws and [place] laws. We do not discriminate on the basis of race, color, national origin, age, disability, sex, sexual orientation, or gender identity.            Thank you!     Thank you for choosing [facility] FOR LUNG SCIENCE AND HEALTH  for your care. Our goal is always to provide you with excellent care. Hearing back from our patients is one way we can continue to improve our services. Please take a few minutes to complete the written survey that you may receive in the mail after your visit with us. Thank you!             Your Updated Medication List - Protect others around you: Learn how to safely use, store and throw away your medicines at www.disposemymeds.org.          This list is accurate as of: 10/6/17 11:41 AM.  Always use your most recent med list.                   Brand Name Dispense Instructions for use Diagnosis    * ALLERGEN IMMUNOTHERAPY PRESCRIPTION     5 mL    Name of Mix: Mix #1  Grass, Tree  Pilo, White  GLY 1:20 w/v, HS  0.3 ml Birch Mix GLY 1:20 w/v, HS  0.3 ml Elm, American GLY 1:20 w/v, HS  0.3 ml Chelan, Shagbark GLY 1:20 w/v, HS  0.3 ml Maple, Hard/Sugar GLY 1:20 w/v, HS  0.3 ml Oak Mix RVW GLY 1:20 w/v, HS 0.1 ml Renato (std) 100,000 BAU/mL Gly, HS 0.2 ml Diluent: HSA qs to 5ml    Chronic seasonal allergic rhinitis due to pollen       * ALLERGEN IMMUNOTHERAPY PRESCRIPTION     5 mL    Name of Mix: Mix #1  Mold Cladosporium Lrtbyienefqfow76,000 pnu/mL (1:20 w/v), ALK  0.2 ml Diluent: HSA qs to 5ml     Chronic seasonal allergic rhinitis due to fungal spores       * ALLERGEN IMMUNOTHERAPY PRESCRIPTION     5 mL    Name of Mix: Mix #3  Dust Mite, Dog, Weeds Dog Hair-Dander, A. P.  1:100 w/v, HS  0.3 ml Dust Mites DF. 10,000 AU/mL, HS  0.5 ml  Lamb's Quarters 1:20 w/v, HS 0.3 ml Nettle 1:20 w/v, HS 0.3 ml Pigweed, Rough Redroot 1:20 w/v, HS 0.3 ml Ragweed, Mix (Giant, Short) 1:20 w/v, HS 0.3 ml Sagebrush, Mugwort 1:20 w/v, HS 0.3 ml Sorrel, Sheep 1:20 w/v, HS 0.3 ml Diluent: HSA qs to 5ml    Chronic seasonal allergic rhinitis due to pollen, Need for desensitization to allergens       * ALLERGEN IMMUNOTHERAPY PRESCRIPTION     5 mL    Name of Mix: Mix #4  Mold, Cat Cat Hair, Standardized 10,000 BAU/mL, ALK  1.0 ml  Alternaria 10,000 pnu/mL (1:20 w/v), ALK  0.2 ml Diluent: HSA qs to 5ml    Chronic allergic rhinitis due to animal hair and dander       azelastine 0.1 % spray    ASTELIN    1 Bottle    Spray 1-2 sprays into both nostrils 2 times daily    Seasonal allergic rhinitis due to pollen       CLARITIN PO      Take 10 mg by mouth as needed        COMPOUNDED NON-CONTROLLED SUBSTANCE - PHARMACY TO MIX COMPOUNDED MEDICATION    CMPD RX    3 vial    Per MD instruction    Need for desensitization to allergens       fluticasone 50 MCG/ACT spray    FLONASE    1 Bottle    Spray 2 sprays into both nostrils daily        montelukast 10 MG tablet    SINGULAIR    30 tablet    Take 1 tablet (10 mg) by mouth every evening    Seasonal allergic rhinitis due to pollen       vitamin D3 2000 UNITS Caps           * Notice:  This list has 4 medication(s) that are the same as other medications prescribed for you. Read the directions carefully, and ask your doctor or other care provider to review them with you.

## 2017-10-18 ENCOUNTER — ALLIED HEALTH/NURSE VISIT (OUTPATIENT)
Dept: PULMONOLOGY | Facility: CLINIC | Age: 27
End: 2017-10-18
Payer: COMMERCIAL

## 2017-10-18 DIAGNOSIS — Z51.6 NEED FOR DESENSITIZATION TO ALLERGENS: Primary | ICD-10-CM

## 2017-10-18 PROCEDURE — 95117 IMMUNOTHERAPY INJECTIONS: CPT | Mod: ZF

## 2017-10-18 NOTE — NURSING NOTE
Chief Complaint   Patient presents with     Allergy Injection     Patient is here for allergy injections. 0.3 ml red top x 4 given. Pt waited in lobby for 30 minutes      Teresita Jerome CMA at 2:04 PM on 10/18/2017  Brett Orosco comes into clinic today at the request of [facility] for allergy injections    No reaction to last injection.    This service provided today was under the direct supervision of Dr. Heck, who was available if needed.    Teresita Jerome CMA

## 2017-10-18 NOTE — MR AVS SNAPSHOT
After Visit Summary   10/18/2017    Brett Orosco    MRN: [id number]           Patient Information     Date Of Birth          1990        Visit Information        Provider Department      10/18/2017 12:00 PM [facility] NURSE [facility] Lung Science and Health        Today's Diagnoses     Need for desensitization to allergens    -  1       Follow-ups after your visit        Who to contact     If you have questions or need follow up information about today's clinic visit or your schedule please contact [facility] HE[facility] directly at [phone].  Normal or non-critical lab and imaging results will be communicated to you by [employer], letter or phone within 4 business days after the clinic has received the results. If you do not hear from us within 7 days, please contact the clinic through [employer] or phone. If you have a critical or abnormal lab result, we will notify you by phone as soon as possible.  Submit refill requests through [employer] or call your pharmacy and they will forward the refill request to us. Please allow 3 business days for your refill to be completed.          Additional Information About Your Visit        MyChart Information     [employer] gives you secure access to your electronic health record. If you see a primary care provider, you can also send messages to your care team and make appointments. If you have questions, please call your primary care clinic.  If you do not have a primary care provider, please call [phone] and they will assist you.        Care EveryWhere ID     This is your Care EveryWhere ID. This could be used by other organizations to access your [place] medical records  [id number]         Blood Pressure from Last 3 Encounters:   04/10/17 104/67   02/28/17 103/60   01/18/17 96/61    Weight from Last 3 Encounters:   04/10/17 77.1 kg (170 lb)   02/28/17 78 kg (172 lb)   01/10/17 76.2 kg (168 lb)              Today, you had the  following     No orders found for display       Primary Care Provider Office Phone # Fax #    Robert Sher [phone]       [address] [facility] [address]        Equal Access to Services     HUNTER TORRES : Hadii aad ku michaelao Sovioletaali, waaxda luqadaha, qaybta kaalmada adeegyada, waxlizz hilarion naida hahn cristobal vivas. So [facility] [phone].    ATENCIÓN: Si habla español, tiene a marquez disposición servicios gratuitos de asistencia lingüística. Llame al [phone].    We comply with applicable federal civil rights laws and [place] laws. We do not discriminate on the basis of race, color, national origin, age, disability, sex, sexual orientation, or gender identity.            Thank you!     Thank you for choosing [facility] LUNG SCIENCE AND HEALTH  for your care. Our goal is always to provide you with excellent care. Hearing back from our patients is one way we can continue to improve our services. Please take a few minutes to complete the written survey that you may receive in the mail after your visit with us. Thank you!             Your Updated Medication List - Protect others around you: Learn how to safely use, store and throw away your medicines at www.disposemymeds.org.          This list is accurate as of: 10/18/17  2:09 PM.  Always use your most recent med list.                   Brand Name Dispense Instructions for use Diagnosis    * ALLERGEN IMMUNOTHERAPY PRESCRIPTION     5 mL    Name of Mix: Mix #1  Grass, Tree  Pilo, White  GLY 1:20 w/v, HS  0.3 ml Birch Mix GLY 1:20 w/v, HS  0.3 ml Elm, American GLY 1:20 w/v, HS  0.3 ml [place], Shagbark GLY 1:20 w/v, HS  0.3 ml Maple, Hard/Sugar GLY 1:20 w/v, HS  0.3 ml Oak Mix RVW GLY 1:20 w/v, HS 0.1 ml Renato (std) 100,000 BAU/mL Gly, HS 0.2 ml Diluent: HSA qs to 5ml    Chronic seasonal allergic rhinitis due to pollen       * ALLERGEN IMMUNOTHERAPY PRESCRIPTION     5 mL    Name of Mix: Mix #1  Mold Cladosporium  Jyczgviyehrhye69,000 pnu/mL (1:20 w/v), ALK  0.2 ml Diluent: HSA qs to 5ml    Chronic seasonal allergic rhinitis due to fungal spores       * ALLERGEN IMMUNOTHERAPY PRESCRIPTION     5 mL    Name of Mix: Mix #3  Dust Mite, Dog, Weeds Dog Hair-Dander, A. P.  1:100 w/v, HS  0.3 ml Dust Mites DF. 10,000 AU/mL, HS  0.5 ml  Lamb's Quarters 1:20 w/v, HS 0.3 ml Nettle 1:20 w/v, HS 0.3 ml Pigweed, Rough Redroot 1:20 w/v, HS 0.3 ml Ragweed, Mix (Giant, Short) 1:20 w/v, HS 0.3 ml Sagebrush, Mugwort 1:20 w/v, HS 0.3 ml Sorrel, Sheep 1:20 w/v, HS 0.3 ml Diluent: HSA qs to 5ml    Chronic seasonal allergic rhinitis due to pollen, Need for desensitization to allergens       * ALLERGEN IMMUNOTHERAPY PRESCRIPTION     5 mL    Name of Mix: Mix #4  Mold, Cat Cat Hair, Standardized 10,000 BAU/mL, ALK  1.0 ml  Alternaria 10,000 pnu/mL (1:20 w/v), ALK  0.2 ml Diluent: HSA qs to 5ml    Chronic allergic rhinitis due to animal hair and dander       azelastine 0.1 % spray    ASTELIN    1 Bottle    Spray 1-2 sprays into both nostrils 2 times daily    Seasonal allergic rhinitis due to pollen       CLARITIN PO      Take 10 mg by mouth as needed        COMPOUNDED NON-CONTROLLED SUBSTANCE - PHARMACY TO MIX COMPOUNDED MEDICATION    CMPD RX    3 vial    Per MD instruction    Need for desensitization to allergens       fluticasone 50 MCG/ACT spray    FLONASE    1 Bottle    Spray 2 sprays into both nostrils daily        montelukast 10 MG tablet    SINGULAIR    30 tablet    Take 1 tablet (10 mg) by mouth every evening    Seasonal allergic rhinitis due to pollen       vitamin D3 2000 UNITS Caps           * Notice:  This list has 4 medication(s) that are the same as other medications prescribed for you. Read the directions carefully, and ask your doctor or other care provider to review them with you.

## 2017-10-31 ENCOUNTER — ALLIED HEALTH/NURSE VISIT (OUTPATIENT)
Dept: PULMONOLOGY | Facility: CLINIC | Age: 27
End: 2017-10-31
Attending: ALLERGY & IMMUNOLOGY
Payer: COMMERCIAL

## 2017-10-31 DIAGNOSIS — Z51.6 NEED FOR DESENSITIZATION TO ALLERGENS: Primary | ICD-10-CM

## 2017-10-31 PROCEDURE — 95117 IMMUNOTHERAPY INJECTIONS: CPT | Mod: ZF

## 2017-10-31 NOTE — MR AVS SNAPSHOT
After Visit Summary   10/31/2017    Brett Orosco    MRN: [id number]           Patient Information     Date Of Birth          1990        Visit Information        Provider Department      10/31/2017 9:00 AM [facility] NURSE [facility] Lung Science and Health        Today's Diagnoses     Need for desensitization to allergens    -  1       Follow-ups after your visit        Who to contact     If you have questions or need follow up information about today's clinic visit or your schedule please contact [facility] SCIENCE AND HEALTH directly at [phone].  Normal or non-critical lab and imaging results will be communicated to you by zoiduhart, letter or phone within 4 business days after the clinic has received the results. If you do not hear from us within 7 days, please contact the clinic through [employer] or phone. If you have a critical or abnormal lab result, we will notify you by phone as soon as possible.  Submit refill requests through [employer] or call your pharmacy and they will forward the refill request to us. Please allow 3 business days for your refill to be completed.          Additional Information About Your Visit        MyChart Information     [employer] gives you secure access to your electronic health record. If you see a primary care provider, you can also send messages to your care team and make appointments. If you have questions, please call your primary care clinic.  If you do not have a primary care provider, please call [phone] and they will assist you.        Care EveryWhere ID     This is your Care EveryWhere ID. This could be used by other organizations to access your [place] medical records  [id number]         Blood Pressure from Last 3 Encounters:   04/10/17 104/67   02/28/17 103/60   01/18/17 96/61    Weight from Last 3 Encounters:   04/10/17 77.1 kg (170 lb)   02/28/17 78 kg (172 lb)   01/10/17 76.2 kg (168 lb)              Today, you had the  following     No orders found for display       Primary Care Provider Office Phone # Fax #    Robert Sher [phone] [address] [facility] [address]        Equal Access to Services     HUNTER TORRES : Hadii aad ku michaelao Sovioletaali, waaxda luqadaha, qaybta kaalmada adeegyada, waxlizz hilarion naida hahn cristobal vivas. So [facility] [phone].    ATENCIÓN: Si habla español, tiene a marquez disposición servicios gratuitos de asistencia lingüística. Llame al [phone].    We comply with applicable federal civil rights laws and [place] laws. We do not discriminate on the basis of race, color, national origin, age, disability, sex, sexual orientation, or gender identity.            Thank you!     Thank you for choosing [facility] LUNG SCIENCE AND HEALTH  for your care. Our goal is always to provide you with excellent care. Hearing back from our patients is one way we can continue to improve our services. Please take a few minutes to complete the written survey that you may receive in the mail after your visit with us. Thank you!             Your Updated Medication List - Protect others around you: Learn how to safely use, store and throw away your medicines at www.disposemymeds.org.          This list is accurate as of: 10/31/17 11:59 PM.  Always use your most recent med list.                   Brand Name Dispense Instructions for use Diagnosis    * ALLERGEN IMMUNOTHERAPY PRESCRIPTION     5 mL    Name of Mix: Mix #1  Grass, Tree  Pilo, White  GLY 1:20 w/v, HS  0.3 ml Birch Mix GLY 1:20 w/v, HS  0.3 ml Elm, American GLY 1:20 w/v, HS  0.3 ml Simpson, Shagbark GLY 1:20 w/v, HS  0.3 ml Maple, Hard/Sugar GLY 1:20 w/v, HS  0.3 ml Oak Mix RVW GLY 1:20 w/v, HS 0.1 ml Renato (std) 100,000 BAU/mL Gly, HS 0.2 ml Diluent: HSA qs to 5ml    Chronic seasonal allergic rhinitis due to pollen       * ALLERGEN IMMUNOTHERAPY PRESCRIPTION     5 mL    Name of Mix: Mix #1  Mold Cladosporium  Thqbmqvqrpfxzq61,000 pnu/mL (1:20 w/v), ALK  0.2 ml Diluent: HSA qs to 5ml    Chronic seasonal allergic rhinitis due to fungal spores       * ALLERGEN IMMUNOTHERAPY PRESCRIPTION     5 mL    Name of Mix: Mix #3  Dust Mite, Dog, Weeds Dog Hair-Dander, A. P.  1:100 w/v, HS  0.3 ml Dust Mites DF. 10,000 AU/mL, HS  0.5 ml  Lamb's Quarters 1:20 w/v, HS 0.3 ml Nettle 1:20 w/v, HS 0.3 ml Pigweed, Rough Redroot 1:20 w/v, HS 0.3 ml Ragweed, Mix (Giant, Short) 1:20 w/v, HS 0.3 ml Sagebrush, Mugwort 1:20 w/v, HS 0.3 ml Sorrel, Sheep 1:20 w/v, HS 0.3 ml Diluent: HSA qs to 5ml    Chronic seasonal allergic rhinitis due to pollen, Need for desensitization to allergens       * ALLERGEN IMMUNOTHERAPY PRESCRIPTION     5 mL    Name of Mix: Mix #4  Mold, Cat Cat Hair, Standardized 10,000 BAU/mL, ALK  1.0 ml  Alternaria 10,000 pnu/mL (1:20 w/v), ALK  0.2 ml Diluent: HSA qs to 5ml    Chronic allergic rhinitis due to animal hair and dander       azelastine 0.1 % spray    ASTELIN    1 Bottle    Spray 1-2 sprays into both nostrils 2 times daily    Seasonal allergic rhinitis due to pollen       CLARITIN PO      Take 10 mg by mouth as needed        COMPOUNDED NON-CONTROLLED SUBSTANCE - PHARMACY TO MIX COMPOUNDED MEDICATION    CMPD RX    3 vial    Per MD instruction    Need for desensitization to allergens       fluticasone 50 MCG/ACT spray    FLONASE    1 Bottle    Spray 2 sprays into both nostrils daily        montelukast 10 MG tablet    SINGULAIR    30 tablet    Take 1 tablet (10 mg) by mouth every evening    Seasonal allergic rhinitis due to pollen       vitamin D3 2000 UNITS Caps           * Notice:  This list has 4 medication(s) that are the same as other medications prescribed for you. Read the directions carefully, and ask your doctor or other care provider to review them with you.

## 2017-10-31 NOTE — NURSING NOTE
Chief Complaint   Patient presents with     Allergy Injection     Patient is here for allergy injection. 0.4 ml red top x 4 given. Pt waited 30 minutes in cindy Jerome CMA at 11:47 AM on 10/31/2017  Brett Orosco comes into clinic today at the request of [facility] for allergy injections    No reaction to last injection.    This service provided today was under the direct supervision of Dr. Heck, who was available if needed.    Teresita Jerome CMA

## 2017-11-15 ENCOUNTER — OFFICE VISIT (OUTPATIENT)
Dept: FAMILY MEDICINE | Facility: CLINIC | Age: 27
End: 2017-11-15

## 2017-11-15 VITALS
BODY MASS INDEX: 21.77 KG/M2 | HEART RATE: 90 BPM | RESPIRATION RATE: 18 BRPM | SYSTOLIC BLOOD PRESSURE: 107 MMHG | TEMPERATURE: 97.5 F | DIASTOLIC BLOOD PRESSURE: 70 MMHG | WEIGHT: 165 LBS | OXYGEN SATURATION: 96 %

## 2017-11-15 DIAGNOSIS — F41.9 ANXIETY: ICD-10-CM

## 2017-11-15 DIAGNOSIS — Z23 NEED FOR PROPHYLACTIC VACCINATION AND INOCULATION AGAINST INFLUENZA: Primary | ICD-10-CM

## 2017-11-15 DIAGNOSIS — Z11.3 SCREENING EXAMINATION FOR SEXUALLY TRANSMITTED DISEASE: ICD-10-CM

## 2017-11-15 RX ORDER — PROPRANOLOL HYDROCHLORIDE 10 MG/1
10 TABLET ORAL
Qty: 60 TABLET | Refills: 0 | Status: SHIPPED | OUTPATIENT
Start: 2017-11-15 | End: 2019-01-17

## 2017-11-15 RX ORDER — HYDROXYZINE PAMOATE 25 MG/1
25 CAPSULE ORAL
Qty: 60 CAPSULE | Refills: 1 | Status: SHIPPED | OUTPATIENT
Start: 2017-11-15 | End: 2019-02-18

## 2017-11-15 ASSESSMENT — ANXIETY QUESTIONNAIRES
GAD7 TOTAL SCORE: 14
1. FEELING NERVOUS, ANXIOUS, OR ON EDGE: NEARLY EVERY DAY
7. FEELING AFRAID AS IF SOMETHING AWFUL MIGHT HAPPEN: MORE THAN HALF THE DAYS
3. WORRYING TOO MUCH ABOUT DIFFERENT THINGS: MORE THAN HALF THE DAYS
6. BECOMING EASILY ANNOYED OR IRRITABLE: SEVERAL DAYS
IF YOU CHECKED OFF ANY PROBLEMS ON THIS QUESTIONNAIRE, HOW DIFFICULT HAVE THESE PROBLEMS MADE IT FOR YOU TO DO YOUR WORK, TAKE CARE OF THINGS AT HOME, OR GET ALONG WITH OTHER PEOPLE: SOMEWHAT DIFFICULT
2. NOT BEING ABLE TO STOP OR CONTROL WORRYING: MORE THAN HALF THE DAYS
5. BEING SO RESTLESS THAT IT IS HARD TO SIT STILL: SEVERAL DAYS

## 2017-11-15 ASSESSMENT — PATIENT HEALTH QUESTIONNAIRE - PHQ9
5. POOR APPETITE OR OVEREATING: NEARLY EVERY DAY
SUM OF ALL RESPONSES TO PHQ QUESTIONS 1-9: 14

## 2017-11-15 ASSESSMENT — PAIN SCALES - GENERAL: PAINLEVEL: NO PAIN (0)

## 2017-11-15 NOTE — PROGRESS NOTES
HPI:  Brett Orosco is a 27 year old male who presents today with a c/o anxiety due to relationship and meeting people on dating application. He feels very stressed, met a girl and slept with her used protection but in his head he feels anxiety related to infection. He is concerned about exposure to STI.  He had the encounter on Saturday.   He is wondering if it would be worth testing at this time  He has had HX of herpes 1 cold sores therefore does not want to have herpes testing at this time.  He feels he has anxiety related to recent dating concerns but does not want to start SSRIs he's been on Zoloft in the past. He is having trouble sleeping at night thinking about possible exposure to socially transmitted infection but feels the likelihood is very low as he used a condom and inquired about risk from the sexual partner. He understands he should not engage and risky sexual behavior as this is really interfering with his ability to relax and his goal is to have a long-term relationship therefore does not want to put himself at risk and will practice safer sexual habits.  He has a history of anxiety prior to speaking and is wondering if he could get a prescription for propranolol to assist intermittently with anxiety. He also is wondering about hydroxyzine which I think is a great idea to assist with helping him sleep as he does have allergies.  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  ROS:  CONSTITUTIONAL: no fatigue, he has lost weight, no fever  SKIN: no worrisome rashes, no worrisome moles, no worrisome lesions  : no frequency, no dysuria, no hematuria  PSYCHIATRIC: He does not feel depressed just notes situational anxiety.      Physical Exam:  Vitals: /70  Pulse 90  Temp  97.5  F (36.4  C) (Oral)  Resp 18  Wt 74.8 kg (165 lb)  SpO2 96%  BMI 21.77 kg/m2  BMI= Body mass index is 21.77 kg/(m^2).    GENERAL APPEARANCE: healthy, alert and no distress, smiling and interactive understands safer sexual practices  EYES: Eyes grossly normal to inspection, PERRL and conjunctivae and sclerae normal  HENT: nose and mouth without ulcers or lesions, oropharynx clear and oral mucous membranes moist  RESP: lungs clear to auscultation - no rales, rhonchi or wheezes  CV: regular rates and rhythm, normal S1 S2, no S3 or S4, no murmur, click or rub  SKIN: no suspicious lesions or rashes   deferred  PSYCH: mentation appears normal and affect normal/bright  ALTA-7 SCORE 4/5/2017 11/15/2017   Total Score 6 14     PHQ-9 SCORE 4/5/2017 11/15/2017   Total Score 8 14     Brett was seen today for anxiety, std and medication question. He had a recent sexual encounter after 1 date used condoms but has remorse after that experience. We discussed safer sexual practices and relationship issues. The likelihood of him having a sexual transmitted infection from one encounter is very low. I discussed I will order labs to be done if he chooses. He has had previous testing which was negative. Discussed need for testing in 3-6 months from recent sexual encounter if he has no current lesions.  He has anxiety which is situational therefore hydroxyzine at bedtime if needed also may help with his allergy symptoms. He also may use propranolol prior to speaking or if needed but only once daily if needed. We also discussed other antidepressant antianxiety medications but he would not like to start them at this time. He is interested in health psychology and will check with his insurance to see if covered referral placed. He should follow up with me in 3-6 months or earlier if needed.  Brett was seen today for anxiety, std and medication question.    Diagnoses and all orders for this visit:    Need for prophylactic vaccination  and inoculation against influenza  -     FLU VACCINE, 3 YRS +, IM  [46857]    Anxiety  -     propranolol (INDERAL) 10 MG tablet; Take 1 tablet (10 mg) by mouth once as needed  -     hydrOXYzine (VISTARIL) 25 MG capsule; Take 1 capsule (25 mg) by mouth nightly as needed for anxiety  -     PSYCHOLOGY ([facility]) REFERRAL    Screening examination for sexually transmitted disease  -     C. trachomatis PCR - Urine, Lab Collect; Future  -     N. gonorrhea PCR - Urine, Lab Collect; Future  -     HIV Antigen Antibody Combo; Future        He will f/u in  3-6 months  Options for treatment and follow-up care were reviewed with the patient. Brett Orosco was engaged and actively involved in the decision making process and verbalized understanding of the options discussed and was satisfied with the final plan.    Robert Sher MD

## 2017-11-15 NOTE — PATIENT INSTRUCTIONS
[facility]: [phone]     [facility] Medication Refill Request Information:  * Please contact your pharmacy regarding ANY request for medication refills.  ** [facility] Prescription Fax = [phone]  * Please allow 3 business days for routine medication refills.  * Please allow 5 business days for controlled substance medication refills.     Primary Care Center Test Result notification information:  *You will be notified with in 7-10 days of your appointment day regarding the results of your test.  If you are on MyChart you will be notified as soon as the provider has reviewed the results and signed off on them.    Follow up in 3 months with Dr. Sher    Lab [phone] (1st Floor [facility])    Health Psychology (Dr. Michael Alexis) [phone] (4th Floor [facility])   Health Psychology (Dr. Gerri Leo) [phone] (4th [facility])   Health Psychology (Dr. Ruperto Mcqueen) [phone] (4th Floor [facility])   Health Psychology (Dr. Mariluz Bobo) [phone] (4th Floor [facility])  Health Psychology (Dr. Magdalena Arboleda) [phone] ([address] [place] [address])

## 2017-11-15 NOTE — NURSING NOTE
Chief Complaint   Patient presents with     Anxiety     Patient is here to discuss anxiety.      STD     Patient would like to be screened for STDs.      Medication Question     Patient is here to discuss a possible new anxiety medication.      Mis Palumbo LPN at 5:38 PM on 11/15/2017.

## 2017-11-15 NOTE — MR AVS SNAPSHOT
After Visit Summary   11/15/2017    Brett Orosco    MRN: [id number]           Patient Information     Date Of Birth          1990        Visit Information        Provider Department      11/15/2017 5:30 PM Robert Sher MD M [facility]        Today's Diagnoses     Need for prophylactic vaccination and inoculation against influenza    -  1    Anxiety        Screening examination for sexually transmitted disease          Care Instructions    Primary Care Center: [phone]     [facility] Medication Refill Request Information:  * Please contact your pharmacy regarding ANY request for medication refills.  ** [facility] Prescription Fax = [phone]  * Please allow 3 business days for routine medication refills.  * Please allow 5 business days for controlled substance medication refills.     Primary Care Center Test Result notification information:  *You will be notified with in 7-10 days of your appointment day regarding the results of your test.  If you are on MyChart you will be notified as soon as the provider has reviewed the results and signed off on them.    Follow up in 3 months with Dr. Sher    Lab [phone] (1st Floor [facility])    Health Psychology (Dr. Michael Alexis) [phone] (4th Floor [facility])   Health Psychology (Dr. Gerri Leo) [phone] ([address] [place] [address])   Health Psychology (Dr. Ruperto Mcqueen) [phone] ([address] [place] [address])   Health Psychology (Dr. Mariluz Bobo) [phone] ([address] [place] [address])  Health Psychology (Dr. Magdalena Arboleda) [phone] ([address] [place] [address])          Follow-ups after your visit        Additional Services     PSYCHOLOGY ([facility] HEALTH PSYCHOLOGY) REFERRAL       Your provider has referred you to:  PREFERRED PROVIDERS: Dr Alexis or associates    Please be aware that coverage of these services is subject to the terms and limitations of your health insurance plan.  Call member services  at your health plan with any benefit or coverage questions.      Please bring the following to your appointment:    >>   Any x-rays, CTs or MRIs which have been performed.  Contact the facility where they were done to arrange for  prior to your scheduled appointment.   >>   List of current medications   >>   This referral request   >>   Any documents/labs given to you for this referral                  Follow-up notes from your care team     Write patient with results Return in about 3 months (around 2/15/2018).      Future tests that were ordered for you today     Open Future Orders        Priority Expected Expires Ordered    C. trachomatis PCR - Urine, Lab Collect Routine 11/15/2017 11/15/2018 11/15/2017    N. gonorrhea PCR - Urine, Lab Collect Routine 11/15/2017 11/15/2018 11/15/2017    HIV Antigen Antibody Combo Routine 11/15/2017 11/15/2018 11/15/2017            Who to contact     Please call your clinic at [phone] to:    Ask questions about your health    Make or cancel appointments    Discuss your medicines    Learn about your test results    Speak to your doctor   If you have compliments or concerns about an experience at your clinic, or if you wish to file a complaint, please contact [facility] Patient Relations at [phone] or email us at [email or web address]         Additional Information About Your Visit        Bizen Information     Bizen gives you secure access to your electronic health record. If you see a primary care provider, you can also send messages to your care team and make appointments. If you have questions, please call your primary care clinic.  If you do not have a primary care provider, please call [phone] and they will assist you.      Bizen is an electronic gateway that provides easy, online access to your medical records. With Bizen, you can request a clinic appointment, read your test results, renew a prescription or  communicate with your care team.     To access your existing account, please contact your [facility] or call [phone] for assistance.        Care EveryWhere ID     This is your Care EveryWhere ID. This could be used by other organizations to access your [place] medical records  [id number]        Your Vitals Were     Pulse Temperature Respirations Pulse Oximetry BMI (Body Mass Index)       90 97.5  F (36.4  C) (Oral) 18 96% 21.77 kg/m2        Blood Pressure from Last 3 Encounters:   11/15/17 107/70   04/10/17 104/67   02/28/17 103/60    Weight from Last 3 Encounters:   11/15/17 74.8 kg (165 lb)   04/10/17 77.1 kg (170 lb)   02/28/17 78 kg (172 lb)              We Performed the Following     FLU VACCINE, 3 YRS +, IM  [27610]     PSYCHOLOGY ([facility] HEALTH PSYCHOLOGY) REFERRAL          Today's Medication Changes          These changes are accurate as of: 11/15/17  6:23 PM.  If you have any questions, ask your nurse or doctor.               Start taking these medicines.        Dose/Directions    hydrOXYzine 25 MG capsule   Commonly known as:  VISTARIL   Used for:  Anxiety   Started by:  Robert Sher MD        Dose:  25 mg   Take 1 capsule (25 mg) by mouth nightly as needed for anxiety   Quantity:  60 capsule   Refills:  1       propranolol 10 MG tablet   Commonly known as:  INDERAL   Used for:  Anxiety   Started by:  Robert Sher MD        Dose:  10 mg   Take 1 tablet (10 mg) by mouth once as needed   Quantity:  60 tablet   Refills:  0         Stop taking these medicines if you haven't already. Please contact your care team if you have questions.     montelukast 10 MG tablet   Commonly known as:  SINGULAIR   Stopped by:  Robert Sher MD                Where to get your medicines      These medications were sent to [facility] [place], [place] - [address] [facility] [address]  [address] [facility] [address] [address]    Hours:   TRANSPLANT PHONE NUMBER [phone] Phone:  [phone]     hydrOXYzine 25 MG capsule    propranolol 10 MG tablet                Primary Care Provider Office Phone # Fax #    Robert Sher [phone] [address] [facility] [address]        Equal Access to Services     ASHLEYLÁZARO MELISSA : Hadii aad ku hadasho Soomaali, waaxda luqadaha, qaybta kaalmada adeegyada, waxay idiin hayaan adeeg kharash la'aan ah. So [facility] [phone].    ATENCIÓN: Si habla español, tiene a marquez disposición servicios gratuitos de asistencia lingüística. Llame al [phone].    We comply with applicable federal civil rights laws and [place] laws. We do not discriminate on the basis of race, color, national origin, age, disability, sex, sexual orientation, or gender identity.            Thank you!     Thank you for choosing [facility]  for your care. Our goal is always to provide you with excellent care. Hearing back from our patients is one way we can continue to improve our services. Please take a few minutes to complete the written survey that you may receive in the mail after your visit with us. Thank you!             Your Updated Medication List - Protect others around you: Learn how to safely use, store and throw away your medicines at www.disposemymeds.org.          This list is accurate as of: 11/15/17  6:23 PM.  Always use your most recent med list.                   Brand Name Dispense Instructions for use Diagnosis    * ALLERGEN IMMUNOTHERAPY PRESCRIPTION     5 mL    Name of Mix: Mix #1  Grass, Tree  Pilo, White  GLY 1:20 w/v, HS  0.3 ml Birch Mix GLY 1:20 w/v, HS  0.3 ml Elm, American GLY 1:20 w/v, HS  0.3 ml Wheeler, Shagbark GLY 1:20 w/v, HS  0.3 ml Maple, Hard/Sugar GLY 1:20 w/v, HS  0.3 ml Oak Mix RVW GLY 1:20 w/v, HS 0.1 ml Renato (std) 100,000 BAU/mL Gly, HS 0.2 ml Diluent: HSA qs to 5ml    Chronic seasonal allergic rhinitis due to pollen       * ALLERGEN IMMUNOTHERAPY  PRESCRIPTION     5 mL    Name of Mix: Mix #1  Mold Cladosporium Rwbgkybnhpjnbx45,000 pnu/mL (1:20 w/v), ALK  0.2 ml Diluent: HSA qs to 5ml    Chronic seasonal allergic rhinitis due to fungal spores       * ALLERGEN IMMUNOTHERAPY PRESCRIPTION     5 mL    Name of Mix: Mix #3  Dust Mite, Dog, Weeds Dog Hair-Dander, A. P.  1:100 w/v, HS  0.3 ml Dust Mites DF. 10,000 AU/mL, HS  0.5 ml  Lamb's Quarters 1:20 w/v, HS 0.3 ml Nettle 1:20 w/v, HS 0.3 ml Pigweed, Rough Redroot 1:20 w/v, HS 0.3 ml Ragweed, Mix (Giant, Short) 1:20 w/v, HS 0.3 ml Sagebrush, Mugwort 1:20 w/v, HS 0.3 ml Sorrel, Sheep 1:20 w/v, HS 0.3 ml Diluent: HSA qs to 5ml    Chronic seasonal allergic rhinitis due to pollen, Need for desensitization to allergens       * ALLERGEN IMMUNOTHERAPY PRESCRIPTION     5 mL    Name of Mix: Mix #4  Mold, Cat Cat Hair, Standardized 10,000 BAU/mL, ALK  1.0 ml  Alternaria 10,000 pnu/mL (1:20 w/v), ALK  0.2 ml Diluent: HSA qs to 5ml    Chronic allergic rhinitis due to animal hair and dander       azelastine 0.1 % spray    ASTELIN    1 Bottle    Spray 1-2 sprays into both nostrils 2 times daily    Seasonal allergic rhinitis due to pollen       CLARITIN PO      Take 10 mg by mouth as needed        COMPOUNDED NON-CONTROLLED SUBSTANCE - PHARMACY TO MIX COMPOUNDED MEDICATION    CMPD RX    3 vial    Per MD instruction    Need for desensitization to allergens       fluticasone 50 MCG/ACT spray    FLONASE    1 Bottle    Spray 2 sprays into both nostrils daily        hydrOXYzine 25 MG capsule    VISTARIL    60 capsule    Take 1 capsule (25 mg) by mouth nightly as needed for anxiety    Anxiety       propranolol 10 MG tablet    INDERAL    60 tablet    Take 1 tablet (10 mg) by mouth once as needed    Anxiety       vitamin D3 2000 UNITS Caps           * Notice:  This list has 4 medication(s) that are the same as other medications prescribed for you. Read the directions carefully, and ask your doctor or other care provider to review them  with you.

## 2017-11-16 ENCOUNTER — ALLIED HEALTH/NURSE VISIT (OUTPATIENT)
Dept: PULMONOLOGY | Facility: CLINIC | Age: 27
End: 2017-11-16
Attending: ALLERGY & IMMUNOLOGY
Payer: COMMERCIAL

## 2017-11-16 DIAGNOSIS — Z51.6 NEED FOR DESENSITIZATION TO ALLERGENS: Primary | ICD-10-CM

## 2017-11-16 PROCEDURE — 95117 IMMUNOTHERAPY INJECTIONS: CPT | Mod: ZF

## 2017-11-16 ASSESSMENT — ANXIETY QUESTIONNAIRES: GAD7 TOTAL SCORE: 14

## 2017-11-16 NOTE — NURSING NOTE
Chief Complaint   Patient presents with     Allergy Injection     Patient is here for allergy injection. 0.5 ml red top x4 given. Pt waited in cindy Orosco comes into clinic today at the request of [facility] for allergy injections    No reaction to last injection.    This service provided today was under the direct supervision of Dr. Mixon, who was available if needed.    OLIVIER Whitfield CMA at 1:26 PM on 11/16/2017

## 2017-11-16 NOTE — PROGRESS NOTES
Rooming Note    Health Maintenance  Health Maintenance Due   Topic Date Due     INFLUENZA VACCINE (SYSTEM ASSIGNED)  09/01/2017   Health maintenance items discussed and ordered/forwarded to PCP  Mis Palumbo LPN at 6:04 PM on 11/15/2017.

## 2017-11-16 NOTE — NURSING NOTE
"Injectable Influenza Immunization Documentation    1.  Has the patient received the information for the injectable influenza vaccine? YES     2. Is the patient 6 months of age or older? YES     3. Does the patient have any of the following contraindications?         Severe allergy to eggs?  No     Severe allergic reaction to previous influenza vaccines? No   Severe allergy to latex? No       History of Guillain-[place] syndrome? No     Currently have a temperature greater than 100.4F? No        4.  Severely egg allergic patients should have flu vaccine eligibility assessed by an MD, RN, or pharmacist, and those who received flu vaccine should be observed for 15 min by an MD, RN, Pharmacist, Medical Technician, or member of clinic staff.\": YES    5. Latex-allergic patients should be given latex-free influenza vaccine Yes. Please reference the Vaccine latex table to determine if your clinic s product is latex-containing.    Patient tolerated well.     Vaccination given by Mis Palumbo LPN at 6:24 PM on 11/15/2017.        "

## 2017-11-16 NOTE — MR AVS SNAPSHOT
After Visit Summary   11/16/2017    Brett Orosco    MRN: [id number]           Patient Information     Date Of Birth          1990        Visit Information        Provider Department      11/16/2017 1:00 PM [facility] NURSE [facility]        Today's Diagnoses     Need for desensitization to allergens    -  1       Follow-ups after your visit        Future tests that were ordered for you today     Open Future Orders        Priority Expected Expires Ordered    C. trachomatis PCR - Urine, Lab Collect Routine 11/15/2017 11/15/2018 11/15/2017    N. gonorrhea PCR - Urine, Lab Collect Routine 11/15/2017 11/15/2018 11/15/2017    HIV Antigen Antibody Combo Routine 11/15/2017 11/15/2018 11/15/2017            Who to contact     If you have questions or need follow up information about today's clinic visit or your schedule please contact [facility] directly at [phone].  Normal or non-critical lab and imaging results will be communicated to you by MyChart, letter or phone within 4 business days after the clinic has received the results. If you do not hear from us within 7 days, please contact the clinic through Erydelhart or phone. If you have a critical or abnormal lab result, we will notify you by phone as soon as possible.  Submit refill requests through Newsle or call your pharmacy and they will forward the refill request to us. Please allow 3 business days for your refill to be completed.          Additional Information About Your Visit        Erydelhart Information     Newsle gives you secure access to your electronic health record. If you see a primary care provider, you can also send messages to your care team and make appointments. If you have questions, please call your primary care clinic.  If you do not have a primary care provider, please call [phone] and they will assist you.        Care EveryWhere ID     This is your Care  EveryWhere ID. This could be used by other organizations to access your [place] medical records  [id number]         Blood Pressure from Last 3 Encounters:   11/15/17 107/70   04/10/17 104/67   02/28/17 103/60    Weight from Last 3 Encounters:   11/15/17 74.8 kg (165 lb)   04/10/17 77.1 kg (170 lb)   02/28/17 78 kg (172 lb)              Today, you had the following     No orders found for display       Primary Care Provider Office Phone # Fax #    Robert Sher [phone]        [facility] [address]        Equal Access to Services     LÁZARO TORRES : Hadii aad ken hadskinnyo Sopepe, waaxda luqadaha, qaybta kaalmada adeegyada, baldomero jain . So [facility] [phone].    ATENCIÓN: Si habla español, tiene a marquez disposición servicios gratuitos de asistencia lingüística. Llame al [phone].    We comply with applicable federal civil rights laws and [place] laws. We do not discriminate on the basis of race, color, national origin, age, disability, sex, sexual orientation, or gender identity.            Thank you!     Thank you for choosing [facility]  for your care. Our goal is always to provide you with excellent care. Hearing back from our patients is one way we can continue to improve our services. Please take a few minutes to complete the written survey that you may receive in the mail after your visit with us. Thank you!             Your Updated Medication List - Protect others around you: Learn how to safely use, store and throw away your medicines at www.disposemymeds.org.          This list is accurate as of: 11/16/17  1:29 PM.  Always use your most recent med list.                   Brand Name Dispense Instructions for use Diagnosis    * ALLERGEN IMMUNOTHERAPY PRESCRIPTION     5 mL    Name of Mix: Mix #1  Grass, Tree  Skinny, White  GLY 1:20 w/v, HS  0.3 ml Birch Mix GLY 1:20 w/v, HS  0.3 ml Elm, American GLY 1:20  w/v, HS  0.3 ml Caldwell, Shagbark GLY 1:20 w/v, HS  0.3 ml Maple, Hard/Sugar GLY 1:20 w/v, HS  0.3 ml Oak Mix RVW GLY 1:20 w/v, HS 0.1 ml Renato (std) 100,000 BAU/mL Gly, HS 0.2 ml Diluent: HSA qs to 5ml    Chronic seasonal allergic rhinitis due to pollen       * ALLERGEN IMMUNOTHERAPY PRESCRIPTION     5 mL    Name of Mix: Mix #1  Mold Cladosporium Plbmmczrhqfbxe00,000 pnu/mL (1:20 w/v), ALK  0.2 ml Diluent: HSA qs to 5ml    Chronic seasonal allergic rhinitis due to fungal spores       * ALLERGEN IMMUNOTHERAPY PRESCRIPTION     5 mL    Name of Mix: Mix #3  Dust Mite, Dog, Weeds Dog Hair-Dander, A. P.  1:100 w/v, HS  0.3 ml Dust Mites DF. 10,000 AU/mL, HS  0.5 ml  Lamb's Quarters 1:20 w/v, HS 0.3 ml Nettle 1:20 w/v, HS 0.3 ml Pigweed, Rough Redroot 1:20 w/v, HS 0.3 ml Ragweed, Mix (Giant, Short) 1:20 w/v, HS 0.3 ml Sagebrush, Mugwort 1:20 w/v, HS 0.3 ml Sorrel, Sheep 1:20 w/v, HS 0.3 ml Diluent: HSA qs to 5ml    Chronic seasonal allergic rhinitis due to pollen, Need for desensitization to allergens       * ALLERGEN IMMUNOTHERAPY PRESCRIPTION     5 mL    Name of Mix: Mix #4  Mold, Cat Cat Hair, Standardized 10,000 BAU/mL, ALK  1.0 ml  Alternaria 10,000 pnu/mL (1:20 w/v), ALK  0.2 ml Diluent: HSA qs to 5ml    Chronic allergic rhinitis due to animal hair and dander       azelastine 0.1 % spray    ASTELIN    1 Bottle    Spray 1-2 sprays into both nostrils 2 times daily    Seasonal allergic rhinitis due to pollen       CLARITIN PO      Take 10 mg by mouth as needed        COMPOUNDED NON-CONTROLLED SUBSTANCE - PHARMACY TO MIX COMPOUNDED MEDICATION    CMPD RX    3 vial    Per MD instruction    Need for desensitization to allergens       fluticasone 50 MCG/ACT spray    FLONASE    1 Bottle    Spray 2 sprays into both nostrils daily        hydrOXYzine 25 MG capsule    VISTARIL    60 capsule    Take 1 capsule (25 mg) by mouth nightly as needed for anxiety    Anxiety       propranolol 10 MG tablet    INDERAL    60 tablet    Take  1 tablet (10 mg) by mouth once as needed    Anxiety       vitamin D3 2000 UNITS Caps           * Notice:  This list has 4 medication(s) that are the same as other medications prescribed for you. Read the directions carefully, and ask your doctor or other care provider to review them with you.

## 2017-11-16 NOTE — NURSING NOTE
"Injectable Influenza Immunization Documentation    1.  Has the patient received the information for the injectable influenza vaccine? YES     2. Is the patient 6 months of age or older? YES     3. Does the patient have any of the following contraindications?         Severe allergy to eggs?  No     Severe allergic reaction to previous influenza vaccines? No   Severe allergy to latex? No       History of Guillain-[place] syndrome? No     Currently have a temperature greater than 100.4F? No        4.  Severely egg allergic patients should have flu vaccine eligibility assessed by an MD, RN, or pharmacist, and those who received flu vaccine should be observed for 15 min by an MD, RN, Pharmacist, Medical Technician, or member of clinic staff.\": YES    5. Latex-allergic patients should be given latex-free influenza vaccine Yes. Please reference the Vaccine latex table to determine if your clinic s product is latex-containing.       Vaccination given by Mis Palumbo LPN at 6:41 PM on 11/15/2017.        "

## 2017-12-18 ENCOUNTER — APPOINTMENT (OUTPATIENT)
Dept: PULMONOLOGY | Facility: CLINIC | Age: 27
End: 2017-12-18
Payer: COMMERCIAL

## 2018-01-08 ENCOUNTER — ALLIED HEALTH/NURSE VISIT (OUTPATIENT)
Dept: PULMONOLOGY | Facility: CLINIC | Age: 28
End: 2018-01-08
Payer: COMMERCIAL

## 2018-01-08 DIAGNOSIS — Z51.6 NEED FOR DESENSITIZATION TO ALLERGENS: Primary | ICD-10-CM

## 2018-01-08 PROCEDURE — 95117 IMMUNOTHERAPY INJECTIONS: CPT | Mod: ZF

## 2018-01-08 NOTE — NURSING NOTE
Chief Complaint   Patient presents with     Allergy Injection     Patient is being seen for allergy injections. Pt was given. 0.5 ml red top x 4. Pt waited in lobby.      Teresita Perez CMA at 2:43 PM on 1/8/2018

## 2018-01-08 NOTE — MR AVS SNAPSHOT
After Visit Summary   1/8/2018    Brett Orosco    MRN: [id number]           Patient Information     Date Of Birth          1990        Visit Information        Provider Department      1/8/2018 2:30 PM [facility] NURSE [facility]        Today's Diagnoses     Need for desensitization to allergens    -  1       Follow-ups after your visit        Who to contact     If you have questions or need follow up information about today's clinic visit or your schedule please contact [facility] directly at [phone].  Normal or non-critical lab and imaging results will be communicated to you by Live Mobilehart, letter or phone within 4 business days after the clinic has received the results. If you do not hear from us within 7 days, please contact the clinic through Oncodesignt or phone. If you have a critical or abnormal lab result, we will notify you by phone as soon as possible.  Submit refill requests through BookThatDoc or call your pharmacy and they will forward the refill request to us. Please allow 3 business days for your refill to be completed.          Additional Information About Your Visit        MyChart Information     BookThatDoc gives you secure access to your electronic health record. If you see a primary care provider, you can also send messages to your care team and make appointments. If you have questions, please call your primary care clinic.  If you do not have a primary care provider, please call [phone] and they will assist you.        Care EveryWhere ID     This is your Care EveryWhere ID. This could be used by other organizations to access your [place] medical records  [id number]         Blood Pressure from Last 3 Encounters:   11/15/17 107/70   04/10/17 104/67   02/28/17 103/60    Weight from Last 3 Encounters:   11/15/17 74.8 kg (165 lb)   04/10/17 77.1 kg (170 lb)   02/28/17 78 kg (172 lb)              Today, you had the  following     No orders found for display       Primary Care Provider Office Phone # Fax #    Robert Sher [phone]       [address] [facility] [address]        Equal Access to Services     HUNTER TORRES : Hadii aad ku michaelao Sovioletaali, waaxda luqadaha, qaybta kaalmada adeegyada, waxlizz hilarion naida hahn cristobal vivas. So [facility] [phone].    ATENCIÓN: Si habla español, tiene a marquez disposición servicios gratuitos de asistencia lingüística. Llame al [phone].    We comply with applicable federal civil rights laws and [place] laws. We do not discriminate on the basis of race, color, national origin, age, disability, sex, sexual orientation, or gender identity.            Thank you!     Thank you for choosing [facility]  for your care. Our goal is always to provide you with excellent care. Hearing back from our patients is one way we can continue to improve our services. Please take a few minutes to complete the written survey that you may receive in the mail after your visit with us. Thank you!             Your Updated Medication List - Protect others around you: Learn how to safely use, store and throw away your medicines at www.disposemymeds.org.          This list is accurate as of: 1/8/18  2:46 PM.  Always use your most recent med list.                   Brand Name Dispense Instructions for use Diagnosis    * ALLERGEN IMMUNOTHERAPY PRESCRIPTION     5 mL    Name of Mix: Mix #1  Grass, Tree  Pilo, White  GLY 1:20 w/v, HS  0.3 ml Birch Mix GLY 1:20 w/v, HS  0.3 ml Elm, American GLY 1:20 w/v, HS  0.3 ml [place], Shagbark GLY 1:20 w/v, HS  0.3 ml Maple, Hard/Sugar GLY 1:20 w/v, HS  0.3 ml Oak Mix RVW GLY 1:20 w/v, HS 0.1 ml Renato (std) 100,000 BAU/mL Gly, HS 0.2 ml Diluent: HSA qs to 5ml    Chronic seasonal allergic rhinitis due to pollen       * ALLERGEN IMMUNOTHERAPY PRESCRIPTION     5 mL    Name of Mix: Mix #1  Mold Cladosporium  Rkazkjhjxywoqm24,000 pnu/mL (1:20 w/v), ALK  0.2 ml Diluent: HSA qs to 5ml    Chronic seasonal allergic rhinitis due to fungal spores       * ALLERGEN IMMUNOTHERAPY PRESCRIPTION     5 mL    Name of Mix: Mix #3  Dust Mite, Dog, Weeds Dog Hair-Dander, A. P.  1:100 w/v, HS  0.3 ml Dust Mites DF. 10,000 AU/mL, HS  0.5 ml  Lamb's Quarters 1:20 w/v, HS 0.3 ml Nettle 1:20 w/v, HS 0.3 ml Pigweed, Rough Redroot 1:20 w/v, HS 0.3 ml Ragweed, Mix (Giant, Short) 1:20 w/v, HS 0.3 ml Sagebrush, Mugwort 1:20 w/v, HS 0.3 ml Sorrel, Sheep 1:20 w/v, HS 0.3 ml Diluent: HSA qs to 5ml    Chronic seasonal allergic rhinitis due to pollen, Need for desensitization to allergens       * ALLERGEN IMMUNOTHERAPY PRESCRIPTION     5 mL    Name of Mix: Mix #4  Mold, Cat Cat Hair, Standardized 10,000 BAU/mL, ALK  1.0 ml  Alternaria 10,000 pnu/mL (1:20 w/v), ALK  0.2 ml Diluent: HSA qs to 5ml    Chronic allergic rhinitis due to animal hair and dander       azelastine 0.1 % spray    ASTELIN    1 Bottle    Spray 1-2 sprays into both nostrils 2 times daily    Seasonal allergic rhinitis due to pollen       CLARITIN PO      Take 10 mg by mouth as needed        COMPOUNDED NON-CONTROLLED SUBSTANCE - PHARMACY TO MIX COMPOUNDED MEDICATION    CMPD RX    3 vial    Per MD instruction    Need for desensitization to allergens       fluticasone 50 MCG/ACT spray    FLONASE    1 Bottle    Spray 2 sprays into both nostrils daily        hydrOXYzine 25 MG capsule    VISTARIL    60 capsule    Take 1 capsule (25 mg) by mouth nightly as needed for anxiety    Anxiety       propranolol 10 MG tablet    INDERAL    60 tablet    Take 1 tablet (10 mg) by mouth once as needed    Anxiety       vitamin D3 2000 UNITS Caps           * Notice:  This list has 4 medication(s) that are the same as other medications prescribed for you. Read the directions carefully, and ask your doctor or other care provider to review them with you.

## 2018-01-16 ENCOUNTER — MYC MEDICAL ADVICE (OUTPATIENT)
Dept: FAMILY MEDICINE | Facility: CLINIC | Age: 28
End: 2018-01-16

## 2018-01-16 ENCOUNTER — OFFICE VISIT (OUTPATIENT)
Dept: INTERNAL MEDICINE | Facility: CLINIC | Age: 28
End: 2018-01-16
Payer: COMMERCIAL

## 2018-01-16 VITALS
RESPIRATION RATE: 16 BRPM | SYSTOLIC BLOOD PRESSURE: 126 MMHG | BODY MASS INDEX: 21.7 KG/M2 | HEART RATE: 90 BPM | TEMPERATURE: 97.7 F | WEIGHT: 164.5 LBS | DIASTOLIC BLOOD PRESSURE: 74 MMHG

## 2018-01-16 DIAGNOSIS — Z91.89 AT RISK FOR SEXUALLY TRANSMITTED DISEASE DUE TO UNPROTECTED SEX: Primary | ICD-10-CM

## 2018-01-16 DIAGNOSIS — Z91.89 AT RISK FOR SEXUALLY TRANSMITTED DISEASE DUE TO UNPROTECTED SEX: ICD-10-CM

## 2018-01-16 ASSESSMENT — PAIN SCALES - GENERAL: PAINLEVEL: NO PAIN (0)

## 2018-01-16 NOTE — MR AVS SNAPSHOT
After Visit Summary   1/16/2018    Brett Orosco    MRN: [id number]           Patient Information     Date Of Birth          1990        Visit Information        Provider Department      1/16/2018 4:00 PM Chantal Sarah APRN [facility]        Today's Diagnoses     At risk for sexually transmitted disease due to unprotected sex    -  1      Care Instructions    Primary Care Center Phone Number [phone]  Primary Care Center Medication Refill Request Information:  * Please contact your pharmacy regarding ANY request for medication refills.  ** [facility] Prescription Fax = [phone]  * Please allow 3 business days for routine medication refills.  * Please allow 5 business days for controlled substance medication refills.     Primary Care Center Test Result notification information:  *You will be notified with in 7-10 days of your appointment day regarding the results of your test.  If you are on MyChart you will be notified as soon as the provider has reviewed the results and signed off on them.                  Follow-ups after your visit        Your next 10 appointments already scheduled     Jan 16, 2018  5:15 PM CST   LAB with [facility] ([facility] [place])    [address] [place] [id number] [phone]           Please do not eat 10-12 hours before your appointment if you are coming in fasting for labs on lipids, cholesterol, or glucose (sugar). This does not apply to pregnant women. Water, hot tea and black coffee (with nothing added) are okay. Do not drink other fluids, diet soda or chew gum.              Who to contact     Please call your clinic at [phone] to:    Ask questions about your health    Make or cancel appointments    Discuss your medicines    Learn about your test results    Speak to your doctor   If you have compliments or concerns about an experience at your clinic, or if you wish to  file a complaint, please contact [facility] Patient Relations at [phone] or email us at [email or web address]         Additional Information About Your Visit        Chicfyhart Information     [email or web address] gives you secure access to your electronic health record. If you see a primary care provider, you can also send messages to your care team and make appointments. If you have questions, please call your primary care clinic.  If you do not have a primary care provider, please call [phone] and they will assist you.      [email or web address] is an electronic gateway that provides easy, online access to your medical records. With [email or web address], you can request a clinic appointment, read your test results, renew a prescription or communicate with your care team.     To access your existing account, please contact your [facility] Clinic or call [phone] for assistance.        Care EveryWhere ID     This is your Care EveryWhere ID. This could be used by other organizations to access your [place] medical records  [id number]        Your Vitals Were     Pulse Temperature Respirations BMI (Body Mass Index)          90 97.7  F (36.5  C) (Oral) 16 21.7 kg/m2         Blood Pressure from Last 3 Encounters:   01/16/18 126/74   11/15/17 107/70   04/10/17 104/67    Weight from Last 3 Encounters:   01/16/18 74.6 kg (164 lb 8 oz)   11/15/17 74.8 kg (165 lb)   04/10/17 77.1 kg (170 lb)              Today, you had the following     No orders found for display       Primary Care Provider Office Phone # Fax #    Robert Sher [phone] [address] [facility] [address]        Equal Access to Services     [facility] Hospital[facility]HELLE : Hadii sheri Dove, miriamda luelizaadaha, antonia kaalmada wyatt, baldomero jain . So [facility] [phone].    ATENCIÓN: Si habla español, tiene a marquez disposición servicios gratuitos de asistencia  lingüísticálvaroBooker Baldwin al [phone].    We comply with applicable federal civil rights laws and [place] laws. We do not discriminate on the basis of race, color, national origin, age, disability, sex, sexual orientation, or gender identity.            Thank you!     Thank you for choosing [facility]  for your care. Our goal is always to provide you with excellent care. Hearing back from our patients is one way we can continue to improve our services. Please take a few minutes to complete the written survey that you may receive in the mail after your visit with us. Thank you!             Your Updated Medication List - Protect others around you: Learn how to safely use, store and throw away your medicines at www.disposemymeds.org.          This list is accurate as of: 1/16/18  4:21 PM.  Always use your most recent med list.                   Brand Name Dispense Instructions for use Diagnosis    * ALLERGEN IMMUNOTHERAPY PRESCRIPTION     5 mL    Name of Mix: Mix #1  Grass, Tree  Pilo, White  GLY 1:20 w/v, HS  0.3 ml Birch Mix GLY 1:20 w/v, HS  0.3 ml Elm, American GLY 1:20 w/v, HS  0.3 ml [place], Shagbark GLY 1:20 w/v, HS  0.3 ml Maple, Hard/Sugar GLY 1:20 w/v, HS  0.3 ml Oak Mix RVW GLY 1:20 w/v, HS 0.1 ml Renato (std) 100,000 BAU/mL Gly, HS 0.2 ml Diluent: HSA qs to 5ml    Chronic seasonal allergic rhinitis due to pollen       * ALLERGEN IMMUNOTHERAPY PRESCRIPTION     5 mL    Name of Mix: Mix #1  Mold Cladosporium Xzqeuyqptlzkis38,000 pnu/mL (1:20 w/v), ALK  0.2 ml Diluent: HSA qs to 5ml    Chronic seasonal allergic rhinitis due to fungal spores       * ALLERGEN IMMUNOTHERAPY PRESCRIPTION     5 mL    Name of Mix: Mix #3  Dust Mite, Dog, Weeds Dog Hair-Dander, A. P.  1:100 w/v, HS  0.3 ml Dust Mites DF. 10,000 AU/mL, HS  0.5 ml  Lamb's Quarters 1:20 w/v, HS 0.3 ml Nettle 1:20 w/v, HS 0.3 ml Pigweed, Rough Redroot 1:20 w/v, HS 0.3 ml Ragweed, Mix (Giant, Short) 1:20 w/v, HS 0.3 ml Sagebrush, Mugwort  1:20 w/v, HS 0.3 ml Sorrel, Sheep 1:20 w/v, HS 0.3 ml Diluent: HSA qs to 5ml    Chronic seasonal allergic rhinitis due to pollen, Need for desensitization to allergens       * ALLERGEN IMMUNOTHERAPY PRESCRIPTION     5 mL    Name of Mix: Mix #4  Mold, Cat Cat Hair, Standardized 10,000 BAU/mL, ALK  1.0 ml  Alternaria 10,000 pnu/mL (1:20 w/v), ALK  0.2 ml Diluent: HSA qs to 5ml    Chronic allergic rhinitis due to animal hair and dander       azelastine 0.1 % spray    ASTELIN    1 Bottle    Spray 1-2 sprays into both nostrils 2 times daily    Seasonal allergic rhinitis due to pollen       CLARITIN PO      Take 10 mg by mouth as needed        COMPOUNDED NON-CONTROLLED SUBSTANCE - PHARMACY TO MIX COMPOUNDED MEDICATION    CMPD RX    3 vial    Per MD instruction    Need for desensitization to allergens       fluticasone 50 MCG/ACT spray    FLONASE    1 Bottle    Spray 2 sprays into both nostrils daily        hydrOXYzine 25 MG capsule    VISTARIL    60 capsule    Take 1 capsule (25 mg) by mouth nightly as needed for anxiety    Anxiety       propranolol 10 MG tablet    INDERAL    60 tablet    Take 1 tablet (10 mg) by mouth once as needed    Anxiety       vitamin D3 2000 UNITS Caps           * Notice:  This list has 4 medication(s) that are the same as other medications prescribed for you. Read the directions carefully, and ask your doctor or other care provider to review them with you.

## 2018-01-16 NOTE — PROGRESS NOTES
Brett Orosco is a 27 year old male who comes in for    CC: rash  HPI:    Mr. Orosco reports small red bumps on the penis started 2-3 days ago. Has been having frequent unprotected sex over last 1.5 weeks with one new female partner. She told him she did not have any STIs and that they did not need to use condoms. He started experiencing some superficial pain/irritation at the base of his penis, thought related to friction, last week, the next day developed red bumps. He thinks it looks more like inflamed hair follicles than anything else. He tried using neosporin on the site, does not think it made a difference. No urinary symptoms.  Otherwise feeling healthy, just mild cold symptoms. Wondered about swollen glands this afternoon, but thinks he's just being paranoid.  No fevers, nasal congestion, cough, sore throat or ear pain.     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)     Decreased cardiac ejection fraction       Current Outpatient Prescriptions   Medication Sig Dispense Refill     propranolol (INDERAL) 10 MG tablet Take 1 tablet (10 mg) by mouth once as needed 60 tablet 0     hydrOXYzine (VISTARIL) 25 MG capsule Take 1 capsule (25 mg) by mouth nightly as needed for anxiety 60 capsule 1     ORDER FOR ALLERGEN IMMUNOTHERAPY Name of Mix: Mix #4  Mold, Cat  Cat Hair, Standardized 10,000 BAU/mL, ALK  1.0 ml   Alternaria 10,000 pnu/mL (1:20 w/v), ALK  0.2 ml  Diluent: HSA qs to 5ml 5 mL PRN     ORDER FOR ALLERGEN IMMUNOTHERAPY Name of Mix: Mix #3  Dust Mite, Dog, Weeds  Dog Hair-Dander, A. P.  1:100 w/v, HS  0.3 ml  Dust Mites DF. 10,000 AU/mL, HS  0.5 ml   Lamb's Quarters 1:20 w/v, HS 0.3 ml  Nettle 1:20 w/v, HS 0.3 ml  Pigweed, Rough Redroot 1:20 w/v, HS 0.3 ml  Ragweed, Mix (Giant, Short) 1:20 w/v, HS 0.3 ml  Sagebrush, Mugwort 1:20 w/v, HS 0.3 ml  Sorrel,  Sheep 1:20 w/v, HS 0.3 ml  Diluent: HSA qs to 5ml 5 mL PRN     ORDER FOR ALLERGEN IMMUNOTHERAPY Name of Mix: Mix #1  Grass, Tree   Pilo, White  GLY 1:20 w/v, HS  0.3 ml  Birch Mix GLY 1:20 w/v, HS  0.3 ml  Elm, American GLY 1:20 w/v, HS  0.3 ml  [place], Shagbark GLY 1:20 w/v, HS  0.3 ml  Maple, Hard/Sugar GLY 1:20 w/v, HS  0.3 ml  Oak Mix RVW GLY 1:20 w/v, HS 0.1 ml  Renato (std) 100,000 BAU/mL Gly, HS 0.2 ml  Diluent: HSA qs to 5ml 5 mL PRN     ORDER FOR ALLERGEN IMMUNOTHERAPY Name of Mix: Mix #1  Mold  Cladosporium Btdbpugzhyyrcx24,000 pnu/mL (1:20 w/v), ALK  0.2 ml  Diluent: HSA qs to 5ml 5 mL PRN     COMPOUND (CMPD RX) - PHARMACY TO MIX COMPOUNDED MEDICATION Per MD instruction 3 vial 0     azelastine (ASTELIN) 0.1 % spray Spray 1-2 sprays into both nostrils 2 times daily 1 Bottle 3     fluticasone (FLONASE) 50 MCG/ACT spray Spray 2 sprays into both nostrils daily 1 Bottle 11     Cholecalciferol (VITAMIN D3) 2000 UNITS CAPS        Loratadine (CLARITIN PO) Take 10 mg by mouth as needed           ALLERGIES: Cats and Seasonal allergies    PAST MEDICAL HX:   Past Medical History:   Diagnosis Date     Clubbing of nail      LILI (obstructive sleep apnea)      Seasonal allergies     requires immunotherapy       PAST SURGICAL HX:   Past Surgical History:   Procedure Laterality Date     left arm fracture      open repair     TONSILLECTOMY & ADENOIDECTOMY  1993       IMMUNIZATION HX:   Immunization History   Administered Date(s) Administered     Influenza (IIV3) PF 11/01/2016, 11/15/2017     TDAP Vaccine (Boostrix) 10/31/2016       SOCIAL HX:   Social History     Social History Narrative    Works in research [employer]     Significant other female       ROS:   CONSTITUTIONAL: no fatigue, no unexpected change in weight  SKIN: see HPI  EYES: no acute vision problems or changes  ENT: no ear problems, no mouth problems, no throat problems  RESP: no significant cough, no shortness of breath  CV: no chest pain, no  palpitations, no new or worsening peripheral edema    OBJECTIVE:  /74  Pulse 90  Temp 97.7  F (36.5  C) (Oral)  Resp 16  Wt 74.6 kg (164 lb 8 oz)  BMI 21.7 kg/m2   Wt Readings from Last 1 Encounters:   01/16/18 74.6 kg (164 lb 8 oz)     Constitutional: no distress, comfortable, pleasant, well-groomed  Eyes: anicteric, conjunctiva pink, normal extra-ocular movements   Ears, Nose and Throat: tympanic membranes pearly gray with positive light reflex, EACs clear bilaterally, nose clear and free of lesions, throat clear, mucosa pink and moist.   Neck: supple with full range of motion, no thyromegaly.   Cardiovascular: regular rate and rhythm, normal S1 and S2, no murmurs, rubs or gallops, peripheral pulses full and symmetric, cap refill <2 sec  Respiratory: clear to auscultation with good air movement bilaterally, no wheezes or crackles, non-labored  : scattered 2-3 mm erythematous vesicles at base of penis, no ulcerated lesions or genital warts    ASSESSMENT/PLAN:    1. At risk for sexually transmitted disease due to unprotected sex  Recommended screening for STIs given recent unprotected intercourse. Advised using condoms to protect from mingo STI. Swab sent for small vesicles to screen for HSV, however, suspect these are more related to glandular inflammation. Does not appear to be folliculitis. Recommended good hygiene with mild soap and water, using water-based lubricant to avoid friction, allow sites to heal prior to intercourse.  - C. trachomatis PCR - Urine, Lab Collect; Future  - N. gonorrhea PCR - Urine, Lab Collect; Future  - Anti Treponema; Future  - Hepatitis B surface antigen; Future  - HIV Antigen Antibody Combo; Future  - HSV 1 and 2 DNA by PCR    FOLLOW UP: If not improving or if worsening  GAURAV Madrid CNP

## 2018-01-16 NOTE — NURSING NOTE
Chief Complaint   Patient presents with     STD     pt is here to discuss possible exposure. Redness and irritation        Jania Mchugh CMA at 3:54 PM on 1/16/2018

## 2018-01-16 NOTE — PATIENT INSTRUCTIONS
Primary Care Center Phone Number [phone]  Primary Care Center Medication Refill Request Information:  * Please contact your pharmacy regarding ANY request for medication refills.  ** [facility] Prescription Fax = [phone]  * Please allow 3 business days for routine medication refills.  * Please allow 5 business days for controlled substance medication refills.     Primary Care Center Test Result notification information:  *You will be notified with in 7-10 days of your appointment day regarding the results of your test.  If you are on MyChart you will be notified as soon as the provider has reviewed the results and signed off on them.

## 2018-01-16 NOTE — PROGRESS NOTES
Rooming Note  Health Maintenance   There are no preventive care reminders to display for this patient. All health maintenance items UP TO DATE

## 2018-01-17 LAB
C TRACH DNA SPEC QL NAA+PROBE: NEGATIVE
HBV SURFACE AG SERPL QL IA: NONREACTIVE
HIV 1+2 AB+HIV1 P24 AG SERPL QL IA: NONREACTIVE
HSV1 DNA SPEC QL NAA+PROBE: NEGATIVE
HSV2 DNA SPEC QL NAA+PROBE: NEGATIVE
N GONORRHOEA DNA SPEC QL NAA+PROBE: NEGATIVE
SPECIMEN SOURCE: NORMAL
T PALLIDUM IGG+IGM SER QL: NEGATIVE

## 2018-02-22 ENCOUNTER — ALLIED HEALTH/NURSE VISIT (OUTPATIENT)
Dept: PULMONOLOGY | Facility: CLINIC | Age: 28
End: 2018-02-22
Payer: COMMERCIAL

## 2018-02-22 DIAGNOSIS — Z51.6 NEED FOR DESENSITIZATION TO ALLERGENS: Primary | ICD-10-CM

## 2018-02-22 PROCEDURE — 95117 IMMUNOTHERAPY INJECTIONS: CPT | Mod: ZF

## 2018-02-22 NOTE — NURSING NOTE
Chief Complaint   Patient presents with     Allergy Injection     Patient is being seen for allergy injections. 0.5 ml red top x 4. Pt waited in lobby for 30 minutes      Teresita Jerome CMA at 12:58 PM on 2/22/2018  Brett Orosco comes into clinic today at the request of [facility] for allergy injections    No reaction to last injection.    This service provided today was under the direct supervision of Dr. Alcantar, who was available if needed.    Teresita Jerome CMA

## 2018-02-22 NOTE — MR AVS SNAPSHOT
After Visit Summary   2/22/2018    Brett Orosco    MRN: [id number]           Patient Information     Date Of Birth          1990        Visit Information        Provider Department      2/22/2018 12:30 PM [facility] NURSE [facility]        Today's Diagnoses     Need for desensitization to allergens    -  1       Follow-ups after your visit        Who to contact     If you have questions or need follow up information about today's clinic visit or your schedule please contact [facility] [facility] directly at [phone].  Normal or non-critical lab and imaging results will be communicated to you by Playdekhart, letter or phone within 4 business days after the clinic has received the results. If you do not hear from us within 7 days, please contact the clinic through DAVIDsTEAt or phone. If you have a critical or abnormal lab result, we will notify you by phone as soon as possible.  Submit refill requests through Welzoo or call your pharmacy and they will forward the refill request to us. Please allow 3 business days for your refill to be completed.          Additional Information About Your Visit        MyChart Information     Welzoo gives you secure access to your electronic health record. If you see a primary care provider, you can also send messages to your care team and make appointments. If you have questions, please call your primary care clinic.  If you do not have a primary care provider, please call [phone] and they will assist you.        Care EveryWhere ID     This is your Care EveryWhere ID. This could be used by other organizations to access your [place] medical records  [id number]         Blood Pressure from Last 3 Encounters:   01/16/18 126/74   11/15/17 107/70   04/10/17 104/67    Weight from Last 3 Encounters:   01/16/18 74.6 kg (164 lb 8 oz)   11/15/17 74.8 kg (165 lb)   04/10/17 77.1 kg (170 lb)              Today, you  had the following     No orders found for display       Primary Care Provider Office Phone # Fax #    Robert Sher [phone]       [address] [facility] [address]        Equal Access to Services     HUNTER TORRES : Hadii aad ku michaelao Soomaali, waaxda luqadaha, qaybta kaalmada adeegyada, waxlizz idiin hayaan ademichelle hahn laRicklori vivas. So [facility] [phone].    ATENCIÓN: Si habla español, tiene a marquez disposición servicios gratuitos de asistencia lingüística. Llame al [phone].    We comply with applicable federal civil rights laws and [place] laws. We do not discriminate on the basis of race, color, national origin, age, disability, sex, sexual orientation, or gender identity.            Thank you!     Thank you for choosing [facility]  for your care. Our goal is always to provide you with excellent care. Hearing back from our patients is one way we can continue to improve our services. Please take a few minutes to complete the written survey that you may receive in the mail after your visit with us. Thank you!             Your Updated Medication List - Protect others around you: Learn how to safely use, store and throw away your medicines at www.disposemymeds.org.          This list is accurate as of 2/22/18  3:52 PM.  Always use your most recent med list.                   Brand Name Dispense Instructions for use Diagnosis    * ALLERGEN IMMUNOTHERAPY PRESCRIPTION     5 mL    Name of Mix: Mix #1  Grass, Tree  Pilo, White  GLY 1:20 w/v, HS  0.3 ml Birch Mix GLY 1:20 w/v, HS  0.3 ml Elm, American GLY 1:20 w/v, HS  0.3 ml [place], Shagbark GLY 1:20 w/v, HS  0.3 ml Maple, Hard/Sugar GLY 1:20 w/v, HS  0.3 ml Oak Mix RVW GLY 1:20 w/v, HS 0.1 ml Renato (std) 100,000 BAU/mL Gly, HS 0.2 ml Diluent: HSA qs to 5ml    Chronic seasonal allergic rhinitis due to pollen       * ALLERGEN IMMUNOTHERAPY PRESCRIPTION     5 mL    Name of Mix: Mix #1  Mold Cladosporium  Egpwcgxycutsdf20,000 pnu/mL (1:20 w/v), ALK  0.2 ml Diluent: HSA qs to 5ml    Chronic seasonal allergic rhinitis due to fungal spores       * ALLERGEN IMMUNOTHERAPY PRESCRIPTION     5 mL    Name of Mix: Mix #3  Dust Mite, Dog, Weeds Dog Hair-Dander, A. P.  1:100 w/v, HS  0.3 ml Dust Mites DF. 10,000 AU/mL, HS  0.5 ml  Lamb's Quarters 1:20 w/v, HS 0.3 ml Nettle 1:20 w/v, HS 0.3 ml Pigweed, Rough Redroot 1:20 w/v, HS 0.3 ml Ragweed, Mix (Giant, Short) 1:20 w/v, HS 0.3 ml Sagebrush, Mugwort 1:20 w/v, HS 0.3 ml Sorrel, Sheep 1:20 w/v, HS 0.3 ml Diluent: HSA qs to 5ml    Chronic seasonal allergic rhinitis due to pollen, Need for desensitization to allergens       * ALLERGEN IMMUNOTHERAPY PRESCRIPTION     5 mL    Name of Mix: Mix #4  Mold, Cat Cat Hair, Standardized 10,000 BAU/mL, ALK  1.0 ml  Alternaria 10,000 pnu/mL (1:20 w/v), ALK  0.2 ml Diluent: HSA qs to 5ml    Chronic allergic rhinitis due to animal hair and dander       azelastine 0.1 % spray    ASTELIN    1 Bottle    Spray 1-2 sprays into both nostrils 2 times daily    Seasonal allergic rhinitis due to pollen       CLARITIN PO      Take 10 mg by mouth as needed        COMPOUNDED NON-CONTROLLED SUBSTANCE - PHARMACY TO MIX COMPOUNDED MEDICATION    CMPD RX    3 vial    Per MD instruction    Need for desensitization to allergens       fluticasone 50 MCG/ACT spray    FLONASE    1 Bottle    Spray 2 sprays into both nostrils daily        hydrOXYzine 25 MG capsule    VISTARIL    60 capsule    Take 1 capsule (25 mg) by mouth nightly as needed for anxiety    Anxiety       propranolol 10 MG tablet    INDERAL    60 tablet    Take 1 tablet (10 mg) by mouth once as needed    Anxiety       vitamin D3 2000 UNITS Caps           * Notice:  This list has 4 medication(s) that are the same as other medications prescribed for you. Read the directions carefully, and ask your doctor or other care provider to review them with you.

## 2018-06-04 ENCOUNTER — ALLIED HEALTH/NURSE VISIT (OUTPATIENT)
Dept: PULMONOLOGY | Facility: CLINIC | Age: 28
End: 2018-06-04
Payer: COMMERCIAL

## 2018-06-04 DIAGNOSIS — Z51.6 NEED FOR DESENSITIZATION TO ALLERGENS: Primary | ICD-10-CM

## 2018-06-04 NOTE — MR AVS SNAPSHOT
After Visit Summary   6/4/2018    Brett Orosco    MRN: [id number]           Patient Information     Date Of Birth          1990        Visit Information        Provider Department      6/4/2018 11:00 AM [facility] NURSE [facility]        Today's Diagnoses     Need for desensitization to allergens    -  1       Follow-ups after your visit        Who to contact     If you have questions or need follow up information about today's clinic visit or your schedule please contact [facility] directly at [phone].  Normal or non-critical lab and imaging results will be communicated to you by TouchPalhart, letter or phone within 4 business days after the clinic has received the results. If you do not hear from us within 7 days, please contact the clinic through BurudaConcertt or phone. If you have a critical or abnormal lab result, we will notify you by phone as soon as possible.  Submit refill requests through "[employer]" or call your pharmacy and they will forward the refill request to us. Please allow 3 business days for your refill to be completed.          Additional Information About Your Visit        MyChart Information     "[employer]" gives you secure access to your electronic health record. If you see a primary care provider, you can also send messages to your care team and make appointments. If you have questions, please call your primary care clinic.  If you do not have a primary care provider, please call [phone] and they will assist you.        Care EveryWhere ID     This is your Care EveryWhere ID. This could be used by other organizations to access your [place] medical records  [id number]         Blood Pressure from Last 3 Encounters:   01/16/18 126/74   11/15/17 107/70   04/10/17 104/67    Weight from Last 3 Encounters:   01/16/18 74.6 kg (164 lb 8 oz)   11/15/17 74.8 kg (165 lb)   04/10/17 77.1 kg (170 lb)              Today, you had  the following     No orders found for display       Primary Care Provider Office Phone # Fax #    Robert Sher [phone]       [address] [facility] [address]        Equal Access to Services     HUNTER TORRES : Hadii sheri ku michaelao Sovioletaali, waaxda luqadaha, qaybta kaalmada adeegyada, waxlizz idiin hayaan naida hahn cristobal vivas. So [facility] [phone].    ATENCIÓN: Si habla español, tiene a marquez disposición servicios gratuitos de asistencia lingüística. Llame al [phone].    We comply with applicable federal civil rights laws and [place] laws. We do not discriminate on the basis of race, color, national origin, age, disability, sex, sexual orientation, or gender identity.            Thank you!     Thank you for choosing [facility] FOR [facility]  for your care. Our goal is always to provide you with excellent care. Hearing back from our patients is one way we can continue to improve our services. Please take a few minutes to complete the written survey that you may receive in the mail after your visit with us. Thank you!             Your Updated Medication List - Protect others around you: Learn how to safely use, store and throw away your medicines at www.disposemymeds.org.          This list is accurate as of 6/4/18 11:13 AM.  Always use your most recent med list.                   Brand Name Dispense Instructions for use Diagnosis    * ALLERGEN IMMUNOTHERAPY PRESCRIPTION     5 mL    Name of Mix: Mix #1  Grass, Tree  Pilo, White  GLY 1:20 w/v, HS  0.3 ml Birch Mix GLY 1:20 w/v, HS  0.3 ml Elm, American GLY 1:20 w/v, HS  0.3 ml [place], Shagbark GLY 1:20 w/v, HS  0.3 ml Maple, Hard/Sugar GLY 1:20 w/v, HS  0.3 ml Oak Mix RVW GLY 1:20 w/v, HS 0.1 ml Renato (std) 100,000 BAU/mL Gly, HS 0.2 ml Diluent: HSA qs to 5ml    Chronic seasonal allergic rhinitis due to pollen       * ALLERGEN IMMUNOTHERAPY PRESCRIPTION     5 mL    Name of Mix: Mix #1  Mold Cladosporium  Aiyacgmqlborrs10,000 pnu/mL (1:20 w/v), ALK  0.2 ml Diluent: HSA qs to 5ml    Chronic seasonal allergic rhinitis due to fungal spores       * ALLERGEN IMMUNOTHERAPY PRESCRIPTION     5 mL    Name of Mix: Mix #3  Dust Mite, Dog, Weeds Dog Hair-Dander, A. P.  1:100 w/v, HS  0.3 ml Dust Mites DF. 10,000 AU/mL, HS  0.5 ml  Lamb's Quarters 1:20 w/v, HS 0.3 ml Nettle 1:20 w/v, HS 0.3 ml Pigweed, Rough Redroot 1:20 w/v, HS 0.3 ml Ragweed, Mix (Giant, Short) 1:20 w/v, HS 0.3 ml Sagebrush, Mugwort 1:20 w/v, HS 0.3 ml Sorrel, Sheep 1:20 w/v, HS 0.3 ml Diluent: HSA qs to 5ml    Chronic seasonal allergic rhinitis due to pollen, Need for desensitization to allergens       * ALLERGEN IMMUNOTHERAPY PRESCRIPTION     5 mL    Name of Mix: Mix #4  Mold, Cat Cat Hair, Standardized 10,000 BAU/mL, ALK  1.0 ml  Alternaria 10,000 pnu/mL (1:20 w/v), ALK  0.2 ml Diluent: HSA qs to 5ml    Chronic allergic rhinitis due to animal hair and dander       azelastine 0.1 % spray    ASTELIN    1 Bottle    Spray 1-2 sprays into both nostrils 2 times daily    Seasonal allergic rhinitis due to pollen       CLARITIN PO      Take 10 mg by mouth as needed        COMPOUNDED NON-CONTROLLED SUBSTANCE - PHARMACY TO MIX COMPOUNDED MEDICATION    CMPD RX    3 vial    Per MD instruction    Need for desensitization to allergens       fluticasone 50 MCG/ACT spray    FLONASE    1 Bottle    Spray 2 sprays into both nostrils daily        hydrOXYzine 25 MG capsule    VISTARIL    60 capsule    Take 1 capsule (25 mg) by mouth nightly as needed for anxiety    Anxiety       propranolol 10 MG tablet    INDERAL    60 tablet    Take 1 tablet (10 mg) by mouth once as needed    Anxiety       vitamin D3 2000 units Caps           * Notice:  This list has 4 medication(s) that are the same as other medications prescribed for you. Read the directions carefully, and ask your doctor or other care provider to review them with you.

## 2018-06-04 NOTE — NURSING NOTE
Chief Complaint   Patient presents with     RECHECK     Allergy Injection     Patient instructed to wait in building for at least 30 min. after injections  Patient came in for weekly Allergy injection. Patient had no reaction to last weeks shots. I gave 0.1 ml of the red bottle x4 sub cutaneous without any issues.  Brett Oorsco comes into clinic today at the request of [facility] for allergy injections    No reaction to last injection.    This service provided today was under the direct supervision of Dr. Rivera, who was available if needed.    Kathy Rogers, CMA

## 2018-06-15 ENCOUNTER — OFFICE VISIT (OUTPATIENT)
Dept: DERMATOLOGY | Facility: CLINIC | Age: 28
End: 2018-06-15
Payer: COMMERCIAL

## 2018-06-15 DIAGNOSIS — D22.60 MELANOCYTIC NEVUS OF UPPER EXTREMITY, UNSPECIFIED LATERALITY: Primary | ICD-10-CM

## 2018-06-15 ASSESSMENT — PAIN SCALES - GENERAL: PAINLEVEL: NO PAIN (0)

## 2018-06-15 NOTE — NURSING NOTE
Dermatology Rooming Note    Brett Orosco's goals for this visit include:   Chief Complaint   Patient presents with     Derm Problem     Brett is here to have a skin check. He states that there are some moles he is concerned about.      Varsha Candelaria LPN

## 2018-06-15 NOTE — PROGRESS NOTES
[facility] Dermatology Note      Dermatology Problem List:  1. Melanocytic nevus     Encounter Date: Willard 15, 2018    CC:   Chief Complaint   Patient presents with     Derm Problem     Brett is here to have a skin check. He states that there are some moles he is concerned about.          History of Present Illness:  Mr. Brett Orosco is a 28 year old male who presents in self referral for skin check. Patient has a history of multiple melanocytic nevus but is particularly concerned about a lesion on his right lower back. He has noticed a difference in color with black doted center compared to previous lesions. He denies increase in size, discharge, surrounding erythema or tenderness of concerned lesion.  He has a family history of melanoma (Great grand mother and grand mother on his mother side). He uses sun protection and denies tanning booths. He is doing well otherwise and in no other acute distress. Patient denies fever, chills, headaches, blurry vision, chest pain, shortness of breath, abdominal pain, nausea, vomiting, diarrhea, constipation, and lower extremity edema.      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 The patient denies use of tanning beds.    Family History:  Family History of melanoma on his mother's side.    Medications:  Current Outpatient Prescriptions   Medication Sig Dispense Refill     azelastine (ASTELIN) 0.1 % spray Spray 1-2 sprays into both nostrils 2  times daily 1 Bottle 3     Cholecalciferol (VITAMIN D3) 2000 UNITS CAPS        COMPOUND (CMPD RX) - PHARMACY TO MIX COMPOUNDED MEDICATION Per MD instruction 3 vial 0     fluticasone (FLONASE) 50 MCG/ACT spray Spray 2 sprays into both nostrils daily 1 Bottle 11     hydrOXYzine (VISTARIL) 25 MG capsule Take 1 capsule (25 mg) by mouth nightly as needed for anxiety 60 capsule 1     Loratadine (CLARITIN PO) Take 10 mg by mouth as needed       ORDER FOR ALLERGEN IMMUNOTHERAPY Name of Mix: Mix #4  Mold, Cat  Cat Hair, Standardized 10,000 BAU/mL, ALK  1.0 ml   Alternaria 10,000 pnu/mL (1:20 w/v), ALK  0.2 ml  Diluent: HSA qs to 5ml 5 mL PRN     ORDER FOR ALLERGEN IMMUNOTHERAPY Name of Mix: Mix #3  Dust Mite, Dog, Weeds  Dog Hair-Dander, A. P.  1:100 w/v, HS  0.3 ml  Dust Mites DF. 10,000 AU/mL, HS  0.5 ml   Lamb's Quarters 1:20 w/v, HS 0.3 ml  Nettle 1:20 w/v, HS 0.3 ml  Pigweed, Rough Redroot 1:20 w/v, HS 0.3 ml  Ragweed, Mix (Giant, Short) 1:20 w/v, HS 0.3 ml  Sagebrush, Mugwort 1:20 w/v, HS 0.3 ml  Sorrel, Sheep 1:20 w/v, HS 0.3 ml  Diluent: HSA qs to 5ml 5 mL PRN     ORDER FOR ALLERGEN IMMUNOTHERAPY Name of Mix: Mix #1  Grass, Tree   Pilo, White  GLY 1:20 w/v, HS  0.3 ml  Birch Mix GLY 1:20 w/v, HS  0.3 ml  Elm, American GLY 1:20 w/v, HS  0.3 ml  [place], Shagbark GLY 1:20 w/v, HS  0.3 ml  Maple, Hard/Sugar GLY 1:20 w/v, HS  0.3 ml  Oak Mix RVW GLY 1:20 w/v, HS 0.1 ml  Renato (std) 100,000 BAU/mL Gly, HS 0.2 ml  Diluent: HSA qs to 5ml 5 mL PRN     ORDER FOR ALLERGEN IMMUNOTHERAPY Name of Mix: Mix #1  Mold  Cladosporium Pdaevoaoodjcry38,000 pnu/mL (1:20 w/v), ALK  0.2 ml  Diluent: HSA qs to 5ml 5 mL PRN     propranolol (INDERAL) 10 MG tablet Take 1 tablet (10 mg) by mouth once as needed 60 tablet 0        Allergies   Allergen Reactions     Cats      Seasonal Allergies Other (See Comments)         Review of Systems:  -As per HPI  -Constitutional: The patient denies fatigue, fevers, chills, unintended weight loss, and  night sweats.  -HEENT: Patient denies nonhealing oral sores.  -Skin: As above in HPI. No additional skin concerns.    Physical exam:  Vitals: There were no vitals taken for this visit.  GEN: This is a well developed, well-nourished male in no acute distress, in a pleasant mood.    SKIN: Full skin, which includes the head/face, both arms, chest, back, abdomen,both legs, groin buttocks, digits and/or nails, was examined.  -There is a skin colored fleshy papule(s) located on the chest and back. Biopsied lesion is located on the right lower back. He has one single colored fleshy papule on the right hallux.   -No other lesions of concern on areas examined.     Impression/Plan:  1. Melanocytic Nevus     ABCDs of melanoma were discussed and self skin checks were advised. Patient was reassured of the benign nature of the lesion but elected to a biopsy performed. Consent form was signed and risk of scarring was discussed.  Punch biopsy:  After discussion of benefits and risks including but not limited to bleeding/bruising, pain/swelling, infection, scar, incomplete removal, nerve damage/numbness, recurrence, and non-diagnostic biopsy, written consent, verbal consent and photographs were obtained. Time-out was performed. The area was cleaned with isopropyl alcohol. 1.5 mL of 1% lidocaine was injected to obtain adequate anesthesia of the lesion on the right lower back. A 6 mm punch biopsy was performed.  4-0 ethilon sutures were utilized to approximate the epidermal edges.  White petroleum jelly/VaselineTM and a bandage was applied to the wound.  Explicit verbal and written wound care instructions were provided.  The patient left the Dermatology Clinic in good condition. The patient was counseled to follow up for suture removal in approximately 8 days., Sun precaution was advised including the use of sun screens of SPF 30 or higher, sun protective clothing, and avoidance of tanning beds.        Follow-up in 1 and prn for new  or changing lesions.        staffed the patient.    Staff Involved:  Resident(Krystal Flores)/Staff(as above)    Staff Physician Comments:  I saw and evaluated the patient with the resident and I agree with the assessment and plan as documented in the resident's note. I was present for the key portions of the procedure.    South Evans MD  Professor  Head of Dermato-Allergy Division  Department of Dermatology  [facility]

## 2018-06-15 NOTE — MR AVS SNAPSHOT
After Visit Summary   6/15/2018    Brett Orosco    MRN: [id number]           Patient Information     Date Of Birth          1990        Visit Information        Provider Department      6/15/2018 1:45 PM [place] Evans MD [facility]         Follow-ups after your visit        Who to contact     Please call your clinic at [phone] to:    Ask questions about your health    Make or cancel appointments    Discuss your medicines    Learn about your test results    Speak to your doctor            Additional Information About Your Visit        MyChart Information     [employer] gives you secure access to your electronic health record. If you see a primary care provider, you can also send messages to your care team and make appointments. If you have questions, please call your primary care clinic.  If you do not have a primary care provider, please call [phone] and they will assist you.      [employer] is an electronic gateway that provides easy, online access to your medical records. With [employer], you can request a clinic appointment, read your test results, renew a prescription or communicate with your care team.     To access your existing account, please contact your [facility] or call [phone] for assistance.        Care EveryWhere ID     This is your Care EveryWhere ID. This could be used by other organizations to access your [place] medical records  [id number]         Blood Pressure from Last 3 Encounters:   01/16/18 126/74   11/15/17 107/70   04/10/17 104/67    Weight from Last 3 Encounters:   01/16/18 74.6 kg (164 lb 8 oz)   11/15/17 74.8 kg (165 lb)   04/10/17 77.1 kg (170 lb)              Today, you had the following     No orders found for display       Primary Care Provider Office Phone # Fax #    Robert Sher [phone]       [address] [facility] [address]        Equal Access to Services     HUNTER TORRES  AH: Hadii aad ku hadskinnyo Soomaali, waaxda luqadaha, qaybta kaalmada adepema, waxlizz valarie sulaimanaubree reaveselianeveronica vivas. So [facility] [phone].    ATENCIÓN: Si roxane landis, tiene a marquez disposición servicios gratuitos de asistencia lingüística. Llame al [phone].    We comply with applicable federal civil rights laws and [place] laws. We do not discriminate on the basis of race, color, national origin, age, disability, sex, sexual orientation, or gender identity.            Thank you!     Thank you for choosing [facility] DERMATOLOGY  for your care. Our goal is always to provide you with excellent care. Hearing back from our patients is one way we can continue to improve our services. Please take a few minutes to complete the written survey that you may receive in the mail after your visit with us. Thank you!             Your Updated Medication List - Protect others around you: Learn how to safely use, store and throw away your medicines at www.disposemymeds.org.          This list is accurate as of 6/15/18  2:38 PM.  Always use your most recent med list.                   Brand Name Dispense Instructions for use Diagnosis    * ALLERGEN IMMUNOTHERAPY PRESCRIPTION     5 mL    Name of Mix: Mix #1  Grass, Tree  Skinny, White  GLY 1:20 w/v, HS  0.3 ml Birch Mix GLY 1:20 w/v, HS  0.3 ml Elm, American GLY 1:20 w/v, HS  0.3 ml Hand, Shagbark GLY 1:20 w/v, HS  0.3 ml Maple, Hard/Sugar GLY 1:20 w/v, HS  0.3 ml Oak Mix RVW GLY 1:20 w/v, HS 0.1 ml Renato (std) 100,000 BAU/mL Gly, HS 0.2 ml Diluent: HSA qs to 5ml    Chronic seasonal allergic rhinitis due to pollen       * ALLERGEN IMMUNOTHERAPY PRESCRIPTION     5 mL    Name of Mix: Mix #1  Mold Cladosporium [id number],000 pnu/mL (1:20 w/v), ALK  0.2 ml Diluent: HSA qs to 5ml    Chronic seasonal allergic rhinitis due to fungal spores       * ALLERGEN IMMUNOTHERAPY PRESCRIPTION     5 mL    Name of Mix: Mix #3  Dust Mite, Dog, Weeds Dog Hair-Dander, A. P.  1:100 w/v, HS   0.3 ml Dust Mites DF. 10,000 AU/mL, HS  0.5 ml  Lamb's Quarters 1:20 w/v, HS 0.3 ml Nettle 1:20 w/v, HS 0.3 ml Pigweed, Rough Redroot 1:20 w/v, HS 0.3 ml Ragweed, Mix (Giant, Short) 1:20 w/v, HS 0.3 ml Sagebrush, Mugwort 1:20 w/v, HS 0.3 ml Sorrel, Sheep 1:20 w/v, HS 0.3 ml Diluent: HSA qs to 5ml    Chronic seasonal allergic rhinitis due to pollen, Need for desensitization to allergens       * ALLERGEN IMMUNOTHERAPY PRESCRIPTION     5 mL    Name of Mix: Mix #4  Mold, Cat Cat Hair, Standardized 10,000 BAU/mL, ALK  1.0 ml  Alternaria 10,000 pnu/mL (1:20 w/v), ALK  0.2 ml Diluent: HSA qs to 5ml    Chronic allergic rhinitis due to animal hair and dander       azelastine 0.1 % spray    ASTELIN    1 Bottle    Spray 1-2 sprays into both nostrils 2 times daily    Seasonal allergic rhinitis due to pollen       CLARITIN PO      Take 10 mg by mouth as needed        COMPOUNDED NON-CONTROLLED SUBSTANCE - PHARMACY TO MIX COMPOUNDED MEDICATION    CMPD RX    3 vial    Per MD instruction    Need for desensitization to allergens       fluticasone 50 MCG/ACT spray    FLONASE    1 Bottle    Spray 2 sprays into both nostrils daily        hydrOXYzine 25 MG capsule    VISTARIL    60 capsule    Take 1 capsule (25 mg) by mouth nightly as needed for anxiety    Anxiety       propranolol 10 MG tablet    INDERAL    60 tablet    Take 1 tablet (10 mg) by mouth once as needed    Anxiety       vitamin D3 2000 units Caps           * Notice:  This list has 4 medication(s) that are the same as other medications prescribed for you. Read the directions carefully, and ask your doctor or other care provider to review them with you.

## 2018-06-15 NOTE — LETTER
6/15/2018     RE: Brett Orosco  [address]  [place] [address]     Dear Colleague,    Thank you for referring your patient, Brett Orosco, to the [facility] DERMATOLOGY at [facility]. Please see a copy of my visit note below.    [facility] Dermatology Note      Dermatology Problem List:  1. Melanocytic nevus     Encounter Date: Willard 15, 2018    CC:   Chief Complaint   Patient presents with     Derm Problem     Brett is here to have a skin check. He states that there are some moles he is concerned about.          History of Present Illness:  Mr. Brett Orosco is a 28 year old male who presents in self referral for skin check. Patient has a history of multiple melanocytic nevus but is particularly concerned about a lesion on his right lower back. He has noticed a difference in color with black doted center compared to previous lesions. He denies increase in size, discharge, surrounding erythema or tenderness of concerned lesion.  He has a family history of melanoma (Great grand mother and grand mother on his mother side). He uses sun protection and denies tanning booths. He is doing well otherwise and in no other acute distress. Patient denies fever, chills, headaches, blurry vision, chest pain, shortness of breath, abdominal pain, nausea, vomiting, diarrhea, constipation, and lower extremity edema.      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 The patient denies use of tanning beds.    Family History:  Family History of melanoma on his mother's side.    Medications:  Current Outpatient Prescriptions   Medication Sig Dispense Refill     azelastine (ASTELIN) 0.1 % spray Spray 1-2 sprays into both nostrils 2 times daily 1 Bottle 3     Cholecalciferol (VITAMIN D3) 2000 UNITS CAPS        COMPOUND (CMPD RX) - PHARMACY TO MIX COMPOUNDED MEDICATION Per MD instruction 3 vial 0     fluticasone (FLONASE) 50 MCG/ACT spray Spray 2 sprays into both nostrils daily 1 Bottle 11     hydrOXYzine (VISTARIL) 25 MG capsule Take 1 capsule (25 mg) by mouth nightly as needed for anxiety 60 capsule 1     Loratadine (CLARITIN PO) Take 10 mg by mouth as needed       ORDER FOR ALLERGEN IMMUNOTHERAPY Name of Mix: Mix #4  Mold, Cat  Cat Hair, Standardized 10,000 BAU/mL, ALK  1.0 ml   Alternaria 10,000 pnu/mL (1:20 w/v), ALK  0.2 ml  Diluent: HSA qs to 5ml 5 mL PRN     ORDER FOR ALLERGEN IMMUNOTHERAPY Name of Mix: Mix #3  Dust Mite, Dog, Weeds  Dog Hair-Dander, A. P.  1:100 w/v, HS  0.3 ml  Dust Mites DF. 10,000 AU/mL, HS  0.5 ml   Lamb's Quarters 1:20 w/v, HS 0.3 ml  Nettle 1:20 w/v, HS 0.3 ml  Pigweed, Rough Redroot 1:20 w/v, HS 0.3 ml  Ragweed, Mix (Giant, Short) 1:20 w/v, HS 0.3 ml  Sagebrush, Mugwort 1:20 w/v, HS 0.3 ml  Sorrel, Sheep 1:20 w/v, HS 0.3 ml  Diluent: HSA qs to 5ml 5 mL PRN     ORDER FOR ALLERGEN IMMUNOTHERAPY Name of Mix: Mix #1  Grass, Tree   Pilo, White  GLY 1:20 w/v, HS  0.3 ml  Birch Mix GLY 1:20 w/v, HS  0.3 ml  Elm, American GLY 1:20 w/v, HS  0.3 ml  [place], Shagbark GLY 1:20 w/v, HS  0.3 ml  Maple, Hard/Sugar GLY 1:20 w/v, HS  0.3 ml  Oak Mix RVW GLY 1:20 w/v, HS 0.1 ml  Renato (std) 100,000 BAU/mL Gly, HS 0.2 ml  Diluent: HSA qs to 5ml 5 mL PRN     ORDER FOR ALLERGEN IMMUNOTHERAPY Name of Mix: Mix #1  Mold  Cladosporium [id number],000 pnu/mL (1:20 w/v), ALK  0.2 ml  Diluent: HSA qs to 5ml 5 mL PRN     propranolol  (INDERAL) 10 MG tablet Take 1 tablet (10 mg) by mouth once as needed 60 tablet 0        Allergies   Allergen Reactions     Cats      Seasonal Allergies Other (See Comments)     Review of Systems:  -As per HPI  -Constitutional: The patient denies fatigue, fevers, chills, unintended weight loss, and night sweats.  -HEENT: Patient denies nonhealing oral sores.  -Skin: As above in HPI. No additional skin concerns.    Physical exam:  Vitals: There were no vitals taken for this visit.  GEN: This is a well developed, well-nourished male in no acute distress, in a pleasant mood.    SKIN: Full skin, which includes the head/face, both arms, chest, back, abdomen,both legs, groin buttocks, digits and/or nails, was examined.  -There is a skin colored fleshy papule(s) located on the chest and back. Biopsied lesion is located on the right lower back. He has one single colored fleshy papule on the right hallux.   -No other lesions of concern on areas examined.     Impression/Plan:  1. Melanocytic Nevus     ABCDs of melanoma were discussed and self skin checks were advised. Patient was reassured of the benign nature of the lesion but elected to a biopsy performed. Consent form was signed and risk of scarring was discussed.  Punch biopsy:  After discussion of benefits and risks including but not limited to bleeding/bruising, pain/swelling, infection, scar, incomplete removal, nerve damage/numbness, recurrence, and non-diagnostic biopsy, written consent, verbal consent and photographs were obtained. Time-out was performed. The area was cleaned with isopropyl alcohol. 1.5 mL of 1% lidocaine was injected to obtain adequate anesthesia of the lesion on the right lower back. A 6 mm punch biopsy was performed.  4-0 ethilon sutures were utilized to approximate the epidermal edges.  White petroleum jelly/VaselineTM and a bandage was applied to the wound.  Explicit verbal and written wound care instructions were provided.  The patient left the  Dermatology Clinic in good condition. The patient was counseled to follow up for suture removal in approximately 8 days., Sun precaution was advised including the use of sun screens of SPF 30 or higher, sun protective clothing, and avoidance of tanning beds.      Follow-up in 1 and prn for new or changing lesions.        staffed the patient.    Staff Involved:  Resident(Krystal Flores)/Staff(as above)    Staff Physician Comments:  I saw and evaluated the patient with the resident and I agree with the assessment and plan as documented in the resident's note. I was present for the key portions of the procedure.    South Evans MD  Professor  Head of Dermato-Allergy Division  Department of Dermatology  [facility]

## 2018-06-20 LAB — COPATH REPORT: NORMAL

## 2018-06-25 ENCOUNTER — ALLIED HEALTH/NURSE VISIT (OUTPATIENT)
Dept: DERMATOLOGY | Facility: CLINIC | Age: 28
End: 2018-06-25
Payer: COMMERCIAL

## 2018-06-25 DIAGNOSIS — Z48.02 VISIT FOR SUTURE REMOVAL: Primary | ICD-10-CM

## 2018-06-25 NOTE — MR AVS SNAPSHOT
After Visit Summary   6/25/2018    Brett Orosco    MRN: [id number]           Patient Information     Date Of Birth          1990        Visit Information        Provider Department      6/25/2018 10:00 AM Nurse,  Dermatology [facility] Dermatology        Today's Diagnoses     Visit for suture removal    -  1       Follow-ups after your visit        Who to contact     Please call your clinic at [phone] to:    Ask questions about your health    Make or cancel appointments    Discuss your medicines    Learn about your test results    Speak to your doctor            Additional Information About Your Visit        MyChart Information     [employer] gives you secure access to your electronic health record. If you see a primary care provider, you can also send messages to your care team and make appointments. If you have questions, please call your primary care clinic.  If you do not have a primary care provider, please call [phone] and they will assist you.      [employer] is an electronic gateway that provides easy, online access to your medical records. With [employer], you can request a clinic appointment, read your test results, renew a prescription or communicate with your care team.     To access your existing account, please contact your [facility] or call [phone] for assistance.        Care EveryWhere ID     This is your Care EveryWhere ID. This could be used by other organizations to access your [place] medical records  [id number]         Blood Pressure from Last 3 Encounters:   01/16/18 126/74   11/15/17 107/70   04/10/17 104/67    Weight from Last 3 Encounters:   01/16/18 74.6 kg (164 lb 8 oz)   11/15/17 74.8 kg (165 lb)   04/10/17 77.1 kg (170 lb)              Today, you had the following     No orders found for display       Primary Care Provider Office Phone # Fax #    Robert Sher [phone] [address] [address]        Equal Access to Services     [facility] [place] MELISSA : Hadii sheri ku hadskinnyroxann Sovioletaali, waaxda luqadaha, qaybta kaalmada wyatt, baldomero vivas. So [facility] [phone].    ATENCIÓN: Si habla español, tiene a marquez disposición servicios gratuitos de asistencia lingüística. Ravename al [phone].    We comply with applicable federal civil rights laws and [place] laws. We do not discriminate on the basis of race, color, national origin, age, disability, sex, sexual orientation, or gender identity.            Thank you!     Thank you for choosing [facility] DERMATOLOGY  for your care. Our goal is always to provide you with excellent care. Hearing back from our patients is one way we can continue to improve our services. Please take a few minutes to complete the written survey that you may receive in the mail after your visit with us. Thank you!             Your Updated Medication List - Protect others around you: Learn how to safely use, store and throw away your medicines at www.disposemymeds.org.          This list is accurate as of 6/25/18 11:59 PM.  Always use your most recent med list.                   Brand Name Dispense Instructions for use Diagnosis    * ALLERGEN IMMUNOTHERAPY PRESCRIPTION     5 mL    Name of Mix: Mix #1  Grass, Tree  Skinny, White  GLY 1:20 w/v, HS  0.3 ml Birch Mix GLY 1:20 w/v, HS  0.3 ml Elm, American GLY 1:20 w/v, HS  0.3 ml [place], Shagbark GLY 1:20 w/v, HS  0.3 ml Maple, Hard/Sugar GLY 1:20 w/v, HS  0.3 ml Oak Mix RVW GLY 1:20 w/v, HS 0.1 ml Renato (std) 100,000 BAU/mL Gly, HS 0.2 ml Diluent: HSA qs to 5ml    Chronic seasonal allergic rhinitis due to pollen       * ALLERGEN IMMUNOTHERAPY PRESCRIPTION     5 mL    Name of Mix: Mix #1  Mold Cladosporium [id number],000 pnu/mL (1:20 w/v), ALK  0.2 ml Diluent: HSA qs to 5ml    Chronic seasonal allergic rhinitis due to fungal spores       * ALLERGEN IMMUNOTHERAPY PRESCRIPTION     5 mL    Name of  Mix: Mix #3  Dust Mite, Dog, Weeds Dog Hair-Dander, A. P.  1:100 w/v, HS  0.3 ml Dust Mites DF. 10,000 AU/mL, HS  0.5 ml  Lamb's Quarters 1:20 w/v, HS 0.3 ml Nettle 1:20 w/v, HS 0.3 ml Pigweed, Rough Redroot 1:20 w/v, HS 0.3 ml Ragweed, Mix (Giant, Short) 1:20 w/v, HS 0.3 ml Sagebrush, Mugwort 1:20 w/v, HS 0.3 ml Sorrel, Sheep 1:20 w/v, HS 0.3 ml Diluent: HSA qs to 5ml    Chronic seasonal allergic rhinitis due to pollen, Need for desensitization to allergens       * ALLERGEN IMMUNOTHERAPY PRESCRIPTION     5 mL    Name of Mix: Mix #4  Mold, Cat Cat Hair, Standardized 10,000 BAU/mL, ALK  1.0 ml  Alternaria 10,000 pnu/mL (1:20 w/v), ALK  0.2 ml Diluent: HSA qs to 5ml    Chronic allergic rhinitis due to animal hair and dander       azelastine 0.1 % spray    ASTELIN    1 Bottle    Spray 1-2 sprays into both nostrils 2 times daily    Seasonal allergic rhinitis due to pollen       CLARITIN PO      Take 10 mg by mouth as needed        COMPOUNDED NON-CONTROLLED SUBSTANCE - PHARMACY TO MIX COMPOUNDED MEDICATION    CMPD RX    3 vial    Per MD instruction    Need for desensitization to allergens       fluticasone 50 MCG/ACT spray    FLONASE    1 Bottle    Spray 2 sprays into both nostrils daily        hydrOXYzine 25 MG capsule    VISTARIL    60 capsule    Take 1 capsule (25 mg) by mouth nightly as needed for anxiety    Anxiety       propranolol 10 MG tablet    INDERAL    60 tablet    Take 1 tablet (10 mg) by mouth once as needed    Anxiety       vitamin D3 2000 units Caps           * Notice:  This list has 4 medication(s) that are the same as other medications prescribed for you. Read the directions carefully, and ask your doctor or other care provider to review them with you.

## 2018-06-25 NOTE — NURSING NOTE
Brett Orosco comes into clinic today at the request of Dr Evans Ordering Provider for Suture removal. Biopsy site clean dry and intact. Mild erythema noted at the site. No s/s of infection. Denies pain. Suture was removed without complication. Discussed results with patient. He understands they are benign and no further action is needed.    This service provided today was under the supervising provider of the day Dr Reyes, who was available if needed.    Alexandra Camarena RN

## 2018-07-12 ENCOUNTER — ALLIED HEALTH/NURSE VISIT (OUTPATIENT)
Dept: PULMONOLOGY | Facility: CLINIC | Age: 28
End: 2018-07-12
Payer: COMMERCIAL

## 2018-07-12 DIAGNOSIS — Z51.6 NEED FOR DESENSITIZATION TO ALLERGENS: Primary | ICD-10-CM

## 2018-07-12 PROCEDURE — 95117 IMMUNOTHERAPY INJECTIONS: CPT | Mod: ZF

## 2018-07-12 NOTE — MR AVS SNAPSHOT
After Visit Summary   7/12/2018    Brett Orosco    MRN: [id number]           Patient Information     Date Of Birth          1990        Visit Information        Provider Department      7/12/2018 12:00 PM [facility] NURSE [facility]        Today's Diagnoses     Need for desensitization to allergens    -  1       Follow-ups after your visit        Who to contact     If you have questions or need follow up information about today's clinic visit or your schedule please contact [facility] [employer] [facility] directly at [phone].  Normal or non-critical lab and imaging results will be communicated to you by Property Partnerhart, letter or phone within 4 business days after the clinic has received the results. If you do not hear from us within 7 days, please contact the clinic through Riffynt or phone. If you have a critical or abnormal lab result, we will notify you by phone as soon as possible.  Submit refill requests through Offermobi or call your pharmacy and they will forward the refill request to us. Please allow 3 business days for your refill to be completed.          Additional Information About Your Visit        MyChart Information     [employer] gives you secure access to your electronic health record. If you see a primary care provider, you can also send messages to your care team and make appointments. If you have questions, please call your primary care clinic.  If you do not have a primary care provider, please call [phone] and they will assist you.        Care EveryWhere ID     This is your Care EveryWhere ID. This could be used by other organizations to access your [place] medical records  [id number]         Blood Pressure from Last 3 Encounters:   01/16/18 126/74   11/15/17 107/70   04/10/17 104/67    Weight from Last 3 Encounters:   01/16/18 74.6 kg (164 lb 8 oz)   11/15/17 74.8 kg (165 lb)   04/10/17 77.1 kg (170 lb)              Today, you  had the following     No orders found for display       Primary Care Provider Office Phone # Fax #    Robert Sher [phone]       [address] [facility] [address]        Equal Access to Services     HUNTER TORRES : Hadii aad ku michaelao Soomaali, waaxda luqadaha, qaybta kaalmada adeegyada, waxlizz idiin hayaan ademichelle hahn laRicklori vivas. So [facility] [phone].    ATENCIÓN: Si habla español, tiene a marquez disposición servicios gratuitos de asistencia lingüística. Llame al [phone].    We comply with applicable federal civil rights laws and [place] laws. We do not discriminate on the basis of race, color, national origin, age, disability, sex, sexual orientation, or gender identity.            Thank you!     Thank you for choosing [facility]  for your care. Our goal is always to provide you with excellent care. Hearing back from our patients is one way we can continue to improve our services. Please take a few minutes to complete the written survey that you may receive in the mail after your visit with us. Thank you!             Your Updated Medication List - Protect others around you: Learn how to safely use, store and throw away your medicines at www.disposemymeds.org.          This list is accurate as of 7/12/18 12:19 PM.  Always use your most recent med list.                   Brand Name Dispense Instructions for use Diagnosis    * ALLERGEN IMMUNOTHERAPY PRESCRIPTION     5 mL    Name of Mix: Mix #1  Grass, Tree  Pilo, White  GLY 1:20 w/v, HS  0.3 ml Birch Mix GLY 1:20 w/v, HS  0.3 ml Elm, American GLY 1:20 w/v, HS  0.3 ml [place], Shagbark GLY 1:20 w/v, HS  0.3 ml Maple, Hard/Sugar GLY 1:20 w/v, HS  0.3 ml Oak Mix RVW GLY 1:20 w/v, HS 0.1 ml Renato (std) 100,000 BAU/mL Gly, HS 0.2 ml Diluent: HSA qs to 5ml    Chronic seasonal allergic rhinitis due to pollen       * ALLERGEN IMMUNOTHERAPY PRESCRIPTION     5 mL    Name of Mix: Mix #1  Mold Cladosporium  [id number],000 pnu/mL (1:20 w/v), ALK  0.2 ml Diluent: HSA qs to 5ml    Chronic seasonal allergic rhinitis due to fungal spores       * ALLERGEN IMMUNOTHERAPY PRESCRIPTION     5 mL    Name of Mix: Mix #3  Dust Mite, Dog, Weeds Dog Hair-Dander, A. P.  1:100 w/v, HS  0.3 ml Dust Mites DF. 10,000 AU/mL, HS  0.5 ml  Lamb's Quarters 1:20 w/v, HS 0.3 ml Nettle 1:20 w/v, HS 0.3 ml Pigweed, Rough Redroot 1:20 w/v, HS 0.3 ml Ragweed, Mix (Giant, Short) 1:20 w/v, HS 0.3 ml Sagebrush, Mugwort 1:20 w/v, HS 0.3 ml Sorrel, Sheep 1:20 w/v, HS 0.3 ml Diluent: HSA qs to 5ml    Chronic seasonal allergic rhinitis due to pollen, Need for desensitization to allergens       * ALLERGEN IMMUNOTHERAPY PRESCRIPTION     5 mL    Name of Mix: Mix #4  Mold, Cat Cat Hair, Standardized 10,000 BAU/mL, ALK  1.0 ml  Alternaria 10,000 pnu/mL (1:20 w/v), ALK  0.2 ml Diluent: HSA qs to 5ml    Chronic allergic rhinitis due to animal hair and dander       azelastine 0.1 % spray    ASTELIN    1 Bottle    Spray 1-2 sprays into both nostrils 2 times daily    Seasonal allergic rhinitis due to pollen       CLARITIN PO      Take 10 mg by mouth as needed        COMPOUNDED NON-CONTROLLED SUBSTANCE - PHARMACY TO MIX COMPOUNDED MEDICATION    CMPD RX    3 vial    Per MD instruction    Need for desensitization to allergens       fluticasone 50 MCG/ACT spray    FLONASE    1 Bottle    Spray 2 sprays into both nostrils daily        hydrOXYzine 25 MG capsule    VISTARIL    60 capsule    Take 1 capsule (25 mg) by mouth nightly as needed for anxiety    Anxiety       propranolol 10 MG tablet    INDERAL    60 tablet    Take 1 tablet (10 mg) by mouth once as needed    Anxiety       vitamin D3 2000 units Caps           * Notice:  This list has 4 medication(s) that are the same as other medications prescribed for you. Read the directions carefully, and ask your doctor or other care provider to review them with you.

## 2018-07-12 NOTE — NURSING NOTE
Chief Complaint   Patient presents with     Allergy Injection     PATIENT IS HERE FOR ALLERGY INJECTION. PT GIVEN 0.2 ML RED TOP X 4. PT WAITED IN ANGELITA Jerome CMA at 12:14 PM on 7/12/2018  Brett Orosco comes into clinic today at the request of [facility] for allergy injections    No reaction to last injection.    This service provided today was under the direct supervision of Dr. Doran, who was available if needed.    Teresita Jerome CMA

## 2018-08-01 ENCOUNTER — ALLIED HEALTH/NURSE VISIT (OUTPATIENT)
Dept: PULMONOLOGY | Facility: CLINIC | Age: 28
End: 2018-08-01
Attending: ALLERGY & IMMUNOLOGY
Payer: COMMERCIAL

## 2018-08-01 DIAGNOSIS — J31.0 CHRONIC RHINITIS: Primary | ICD-10-CM

## 2018-08-01 DIAGNOSIS — Z51.6 NEED FOR DESENSITIZATION TO ALLERGENS: Primary | ICD-10-CM

## 2018-08-01 PROCEDURE — 95117 IMMUNOTHERAPY INJECTIONS: CPT | Mod: ZF

## 2018-08-01 RX ORDER — FLUTICASONE PROPIONATE 50 MCG
2 SPRAY, SUSPENSION (ML) NASAL DAILY
Qty: 1 BOTTLE | Refills: 0 | Status: SHIPPED | OUTPATIENT
Start: 2018-08-01 | End: 2018-12-17

## 2018-08-01 NOTE — MR AVS SNAPSHOT
After Visit Summary   8/1/2018    Brett Orosco    MRN: [id number]           Patient Information     Date Of Birth          1990        Visit Information        Provider Department      8/1/2018 1:00 PM [facility] NURSE [facility]        Today's Diagnoses     Need for desensitization to allergens    -  1       Follow-ups after your visit        Who to contact     If you have questions or need follow up information about today's clinic visit or your schedule please contact [facility] directly at [phone].  Normal or non-critical lab and imaging results will be communicated to you by Comprehend Systemshart, letter or phone within 4 business days after the clinic has received the results. If you do not hear from us within 7 days, please contact the clinic through [employer] or phone. If you have a critical or abnormal lab result, we will notify you by phone as soon as possible.  Submit refill requests through Minor [employer] or call your pharmacy and they will forward the refill request to us. Please allow 3 business days for your refill to be completed.          Additional Information About Your Visit        MyChart Information     [employer] gives you secure access to your electronic health record. If you see a primary care provider, you can also send messages to your care team and make appointments. If you have questions, please call your primary care clinic.  If you do not have a primary care provider, please call [phone] and they will assist you.        Care EveryWhere ID     This is your Care EveryWhere ID. This could be used by other organizations to access your [place] medical records  [id number]         Blood Pressure from Last 3 Encounters:   01/16/18 126/74   11/15/17 107/70   04/10/17 104/67    Weight from Last 3 Encounters:   01/16/18 74.6 kg (164 lb 8 oz)   11/15/17 74.8 kg (165 lb)   04/10/17 77.1 kg (170 lb)              Today, you had  the following     No orders found for display         Where to get your medicines      These medications were sent to [place], [place] - [address]  [address], [facility] [address]    Hours:  TRANSPLANT PHONE NUMBER [phone] Phone:  [phone]     fluticasone 50 MCG/ACT spray          Primary Care Provider Office Phone # Fax #    Robert Sher [phone]       [address] [place] [address]        Equal Access to Services     [facility]: Hadii aad ku hadasho Soomaali, waaxda luqadaha, qaybta kaalmada adeegyada, waxay idiin hayaan adeeg kharaveronica larafita . So [facility] [phone].    ATENCIÓN: Si habla español, tiene a marquez disposición servicios gratuitos de asistencia lingüística. [facility] [phone].    We comply with applicable federal civil rights laws and [place] laws. We do not discriminate on the basis of race, color, national origin, age, disability, sex, sexual orientation, or gender identity.            Thank you!     Thank you for choosing [facility]  for your care. Our goal is always to provide you with excellent care. Hearing back from our patients is one way we can continue to improve our services. Please take a few minutes to complete the written survey that you may receive in the mail after your visit with us. Thank you!             Your Updated Medication List - Protect others around you: Learn how to safely use, store and throw away your medicines at www.disposemymeds.org.          This list is accurate as of 8/1/18  4:23 PM.  Always use your most recent med list.                   Brand Name Dispense Instructions for use Diagnosis    * ALLERGEN IMMUNOTHERAPY PRESCRIPTION     5 mL    Name of Mix: Mix #1  Grass, Tree  Pilo, White  GLY 1:20 w/v, HS  0.3 ml Birch Mix GLY 1:20 w/v, HS  0.3 ml Elm, American GLY 1:20 w/v, HS  0.3 ml [place], Shagbark GLY 1:20 w/v, HS   0.3 ml Maple, Hard/Sugar GLY 1:20 w/v, HS  0.3 ml Oak Mix RVW GLY 1:20 w/v, HS 0.1 ml Renato (std) 100,000 BAU/mL Gly, HS 0.2 ml Diluent: HSA qs to 5ml    Chronic seasonal allergic rhinitis due to pollen       * ALLERGEN IMMUNOTHERAPY PRESCRIPTION     5 mL    Name of Mix: Mix #1  Mold Cladosporium Wgugkfivoemckd98,000 pnu/mL (1:20 w/v), ALK  0.2 ml Diluent: HSA qs to 5ml    Chronic seasonal allergic rhinitis due to fungal spores       * ALLERGEN IMMUNOTHERAPY PRESCRIPTION     5 mL    Name of Mix: Mix #3  Dust Mite, Dog, Weeds Dog Hair-Dander, A. P.  1:100 w/v, HS  0.3 ml Dust Mites DF. 10,000 AU/mL, HS  0.5 ml  Lamb's Quarters 1:20 w/v, HS 0.3 ml Nettle 1:20 w/v, HS 0.3 ml Pigweed, Rough Redroot 1:20 w/v, HS 0.3 ml Ragweed, Mix (Giant, Short) 1:20 w/v, HS 0.3 ml Sagebrush, Mugwort 1:20 w/v, HS 0.3 ml Sorrel, Sheep 1:20 w/v, HS 0.3 ml Diluent: HSA qs to 5ml    Chronic seasonal allergic rhinitis due to pollen, Need for desensitization to allergens       * ALLERGEN IMMUNOTHERAPY PRESCRIPTION     5 mL    Name of Mix: Mix #4  Mold, Cat Cat Hair, Standardized 10,000 BAU/mL, ALK  1.0 ml  Alternaria 10,000 pnu/mL (1:20 w/v), ALK  0.2 ml Diluent: HSA qs to 5ml    Chronic allergic rhinitis due to animal hair and dander       azelastine 0.1 % spray    ASTELIN    1 Bottle    Spray 1-2 sprays into both nostrils 2 times daily    Seasonal allergic rhinitis due to pollen       CLARITIN PO      Take 10 mg by mouth as needed        COMPOUNDED NON-CONTROLLED SUBSTANCE - PHARMACY TO MIX COMPOUNDED MEDICATION    CMPD RX    3 vial    Per MD instruction    Need for desensitization to allergens       fluticasone 50 MCG/ACT spray    FLONASE    1 Bottle    Spray 2 sprays into both nostrils daily    Chronic rhinitis       hydrOXYzine 25 MG capsule    VISTARIL    60 capsule    Take 1 capsule (25 mg) by mouth nightly as needed for anxiety    Anxiety       propranolol 10 MG tablet    INDERAL    60 tablet    Take 1 tablet (10 mg) by mouth once  as needed    Anxiety       vitamin D3 2000 units Caps           * Notice:  This list has 4 medication(s) that are the same as other medications prescribed for you. Read the directions carefully, and ask your doctor or other care provider to review them with you.

## 2018-08-01 NOTE — NURSING NOTE
Chief Complaint   Patient presents with     Allergy Injection     Patient is here for allergy injections. 0.3 ml red top x 4 given      Teresita Jerome CMA at 1:55 PM on 8/1/2018  .Brett Orosco comes into clinic today at the request of [facility] for allergy injections    No reaction to last injection.    This service provided today was under the direct supervision of Dr. Syed, who was available if needed.    Teresita Jerome, CMA

## 2018-08-17 ENCOUNTER — ALLIED HEALTH/NURSE VISIT (OUTPATIENT)
Dept: PULMONOLOGY | Facility: CLINIC | Age: 28
End: 2018-08-17
Attending: ALLERGY & IMMUNOLOGY
Payer: COMMERCIAL

## 2018-08-17 DIAGNOSIS — Z51.6 NEED FOR DESENSITIZATION TO ALLERGENS: ICD-10-CM

## 2018-08-17 PROCEDURE — 95117 IMMUNOTHERAPY INJECTIONS: CPT | Mod: ZF

## 2018-08-17 RX ORDER — BENZOCAINE/MENTHOL 6 MG-10 MG
LOZENGE MUCOUS MEMBRANE 2 TIMES DAILY
Status: DISPENSED | OUTPATIENT
Start: 2018-08-17 | End: 2019-08-17

## 2018-08-17 NOTE — NURSING NOTE
Chief Complaint   Patient presents with     Allergy Injection     Patient is here for allergy injection. 0.4 ml red top x 4      Teresita Jerome CMA at 3:07 PM on 8/17/2018  Brett Orosco comes into clinic today at the request of [facility] for allergy injections    No reaction to last injection.    This service provided today was under the direct supervision of Dr. Jones, who was available if needed.    Teresita Jerome CMA

## 2018-08-17 NOTE — MR AVS SNAPSHOT
After Visit Summary   8/17/2018    Brett Orosco    MRN: [id number]           Patient Information     Date Of Birth          1990        Visit Information        Provider Department      8/17/2018 2:30 PM [facility] NURSE [facility] Lung Science and [facility]        Today's Diagnoses     Need for desensitization to allergens           Follow-ups after your visit        Who to contact     If you have questions or need follow up information about today's clinic visit or your schedule please contact [facility] directly at [phone].  Normal or non-critical lab and imaging results will be communicated to you by Comat Technologieshart, letter or phone within 4 business days after the clinic has received the results. If you do not hear from us within 7 days, please contact the clinic through Peg[employer] or phone. If you have a critical or abnormal lab result, we will notify you by phone as soon as possible.  Submit refill requests through Marketcetera or call your pharmacy and they will forward the refill request to us. Please allow 3 business days for your refill to be completed.          Additional Information About Your Visit        MyChart Information     [employer] gives you secure access to your electronic health record. If you see a primary care provider, you can also send messages to your care team and make appointments. If you have questions, please call your primary care clinic.  If you do not have a primary care provider, please call [phone] and they will assist you.        Care EveryWhere ID     This is your Care EveryWhere ID. This could be used by other organizations to access your [place] medical records  [id number]         Blood Pressure from Last 3 Encounters:   01/16/18 126/74   11/15/17 107/70   04/10/17 104/67    Weight from Last 3 Encounters:   01/16/18 74.6 kg (164 lb 8 oz)   11/15/17 74.8 kg (165 lb)   04/10/17 77.1 kg (170 lb)              Today, you had  the following     No orders found for display       Primary Care Provider Office Phone # Fax #    Robert Sher [phone]       [address] [facility] [address]        Equal Access to Services     HUNTER TORRES : Hadpro sheri rogers destiny Sopepe, waaxda luqadaha, qaybta kaalmada ademichelleyada, baldomero hahn cristobal vivas. So [facility] [phone].    ATENCIÓN: Si habla español, tiene a marquez disposición servicios gratuitos de asistencia lingüística. Llame al [phone].    We comply with applicable federal civil rights laws and [place] laws. We do not discriminate on the basis of race, color, national origin, age, disability, sex, sexual orientation, or gender identity.            Thank you!     Thank you for choosing [facility] HE[facility]  for your care. Our goal is always to provide you with excellent care. Hearing back from our patients is one way we can continue to improve our services. Please take a few minutes to complete the written survey that you may receive in the mail after your visit with us. Thank you!             Your Updated Medication List - Protect others around you: Learn how to safely use, store and throw away your medicines at www.disposemymeds.org.          This list is accurate as of 8/17/18  3:07 PM.  Always use your most recent med list.                   Brand Name Dispense Instructions for use Diagnosis    azelastine 0.1 % nasal spray    ASTELIN    1 Bottle    Spray 1-2 sprays into both nostrils 2 times daily    Seasonal allergic rhinitis due to pollen       CLARITIN PO      Take 10 mg by mouth as needed        COMPOUNDED NON-CONTROLLED SUBSTANCE - PHARMACY TO MIX COMPOUNDED MEDICATION    CMPD RX    3 vial    Per MD instruction    Need for desensitization to allergens       fluticasone 50 MCG/ACT spray    FLONASE    1 Bottle    Spray 2 sprays into both nostrils daily    Chronic rhinitis       hydrOXYzine 25 MG capsule    VISTARIL    60  capsule    Take 1 capsule (25 mg) by mouth nightly as needed for anxiety    Anxiety       * ORDER FOR ALLERGEN IMMUNOTHERAPY 5 mL vial     5 mL    Name of Mix: Mix #1  Grass, Tree  Pilo, White  GLY 1:20 w/v, HS  0.3 ml Birch Mix GLY 1:20 w/v, HS  0.3 ml Elm, American GLY 1:20 w/v, HS  0.3 ml [place], Shagbark GLY 1:20 w/v, HS  0.3 ml Maple, Hard/Sugar GLY 1:20 w/v, HS  0.3 ml Oak Mix RVW GLY 1:20 w/v, HS 0.1 ml Renato (std) 100,000 BAU/mL Gly, HS 0.2 ml Diluent: HSA qs to 5ml    Chronic seasonal allergic rhinitis due to pollen       * ORDER FOR ALLERGEN IMMUNOTHERAPY 5 mL vial     5 mL    Name of Mix: Mix #1  Mold Cladosporium Btrdqpfggtzwij44,000 pnu/mL (1:20 w/v), ALK  0.2 ml Diluent: HSA qs to 5ml    Chronic seasonal allergic rhinitis due to fungal spores       * ORDER FOR ALLERGEN IMMUNOTHERAPY 5 mL vial     5 mL    Name of Mix: Mix #3  Dust Mite, Dog, Weeds Dog Hair-Dander, A. P.  1:100 w/v, HS  0.3 ml Dust Mites DF. 10,000 AU/mL, HS  0.5 ml  Lamb's Quarters 1:20 w/v, HS 0.3 ml Nettle 1:20 w/v, HS 0.3 ml Pigweed, Rough Redroot 1:20 w/v, HS 0.3 ml Ragweed, Mix (Giant, Short) 1:20 w/v, HS 0.3 ml Sagebrush, Mugwort 1:20 w/v, HS 0.3 ml Sorrel, Sheep 1:20 w/v, HS 0.3 ml Diluent: HSA qs to 5ml    Chronic seasonal allergic rhinitis due to pollen, Need for desensitization to allergens       * ORDER FOR ALLERGEN IMMUNOTHERAPY 5 mL vial     5 mL    Name of Mix: Mix #4  Mold, Cat Cat Hair, Standardized 10,000 BAU/mL, ALK  1.0 ml  Alternaria 10,000 pnu/mL (1:20 w/v), ALK  0.2 ml Diluent: HSA qs to 5ml    Chronic allergic rhinitis due to animal hair and dander       propranolol 10 MG tablet    INDERAL    60 tablet    Take 1 tablet (10 mg) by mouth once as needed    Anxiety       vitamin D3 2000 units Caps           * Notice:  This list has 4 medication(s) that are the same as other medications prescribed for you. Read the directions carefully, and ask your doctor or other care provider to review them with you.

## 2018-09-19 ENCOUNTER — ALLIED HEALTH/NURSE VISIT (OUTPATIENT)
Dept: PULMONOLOGY | Facility: CLINIC | Age: 28
End: 2018-09-19
Payer: COMMERCIAL

## 2018-09-19 DIAGNOSIS — Z51.6 NEED FOR DESENSITIZATION TO ALLERGENS: Primary | ICD-10-CM

## 2018-09-19 PROCEDURE — 95117 IMMUNOTHERAPY INJECTIONS: CPT | Mod: ZF

## 2018-09-19 NOTE — MR AVS SNAPSHOT
After Visit Summary   9/19/2018    Brett Orosco    MRN: [id number]           Patient Information     Date Of Birth          1990        Visit Information        Provider Department      9/19/2018 2:30 PM [facility] NURSE [facility]        Today's Diagnoses     Need for desensitization to allergens    -  1       Follow-ups after your visit        Who to contact     If you have questions or need follow up information about today's clinic visit or your schedule please contact [facility] directly at [phone].  Normal or non-critical lab and imaging results will be communicated to you by [facility], letter or phone within 4 business days after the clinic has received the results. If you do not hear from us within 7 days, please contact the clinic through [employer] Mobile[employer] or phone. If you have a critical or abnormal lab result, we will notify you by phone as soon as possible.  Submit refill requests through Chogger or call your pharmacy and they will forward the refill request to us. Please allow 3 business days for your refill to be completed.          Additional Information About Your Visit        MyChart Information     Chogger gives you secure access to your electronic health record. If you see a primary care provider, you can also send messages to your care team and make appointments. If you have questions, please call your primary care clinic.  If you do not have a primary care provider, please call [phone] and they will assist you.        Care EveryWhere ID     This is your Care EveryWhere ID. This could be used by other organizations to access your [place] medical records  [id number]         Blood Pressure from Last 3 Encounters:   01/16/18 126/74   11/15/17 107/70   04/10/17 104/67    Weight from Last 3 Encounters:   01/16/18 74.6 kg (164 lb 8 oz)   11/15/17 74.8 kg (165 lb)   04/10/17 77.1 kg (170 lb)              Today, you  had the following     No orders found for display       Primary Care Provider Office Phone # Fax #    Robert Sher [phone]       [address] [facility] [address]        Equal Access to Services     HUNTER TORRES : Hadii aad ku michaelao Sovioletaali, waaxda luqadaha, qaybta kaalmada ademichelleyada, baldomero hilarion naida hahn cristobal vivas. So [facility] [phone].    ATENCIÓN: Si habla español, tiene a marquez disposición servicios gratuitos de asistencia lingüística. Llame al [phone].    We comply with applicable federal civil rights laws and [place] laws. We do not discriminate on the basis of race, color, national origin, age, disability, sex, sexual orientation, or gender identity.            Thank you!     Thank you for choosing [facility]  for your care. Our goal is always to provide you with excellent care. Hearing back from our patients is one way we can continue to improve our services. Please take a few minutes to complete the written survey that you may receive in the mail after your visit with us. Thank you!             Your Updated Medication List - Protect others around you: Learn how to safely use, store and throw away your medicines at www.disposemymeds.org.          This list is accurate as of 9/19/18  2:44 PM.  Always use your most recent med list.                   Brand Name Dispense Instructions for use Diagnosis    azelastine 0.1 % nasal spray    ASTELIN    1 Bottle    Spray 1-2 sprays into both nostrils 2 times daily    Seasonal allergic rhinitis due to pollen       CLARITIN PO      Take 10 mg by mouth as needed        COMPOUNDED NON-CONTROLLED SUBSTANCE - PHARMACY TO MIX COMPOUNDED MEDICATION    CMPD RX    3 vial    Per MD instruction    Need for desensitization to allergens       fluticasone 50 MCG/ACT spray    FLONASE    1 Bottle    Spray 2 sprays into both nostrils daily    Chronic rhinitis       hydrOXYzine 25 MG capsule    VISTARIL     60 capsule    Take 1 capsule (25 mg) by mouth nightly as needed for anxiety    Anxiety       * ORDER FOR ALLERGEN IMMUNOTHERAPY 5 mL vial     5 mL    Name of Mix: Mix #1  Grass, Tree  Pilo, White  GLY 1:20 w/v, HS  0.3 ml Birch Mix GLY 1:20 w/v, HS  0.3 ml Elm, American GLY 1:20 w/v, HS  0.3 ml Howells, Shagbark GLY 1:20 w/v, HS  0.3 ml Maple, Hard/Sugar GLY 1:20 w/v, HS  0.3 ml Oak Mix RVW GLY 1:20 w/v, HS 0.1 ml Renato (std) 100,000 BAU/mL Gly, HS 0.2 ml Diluent: HSA qs to 5ml    Chronic seasonal allergic rhinitis due to pollen       * ORDER FOR ALLERGEN IMMUNOTHERAPY 5 mL vial     5 mL    Name of Mix: Mix #1  Mold Cladosporium Pwplhiamdjoqqm60,000 pnu/mL (1:20 w/v), ALK  0.2 ml Diluent: HSA qs to 5ml    Chronic seasonal allergic rhinitis due to fungal spores       * ORDER FOR ALLERGEN IMMUNOTHERAPY 5 mL vial     5 mL    Name of Mix: Mix #3  Dust Mite, Dog, Weeds Dog Hair-Dander, A. P.  1:100 w/v, HS  0.3 ml Dust Mites DF. 10,000 AU/mL, HS  0.5 ml  Lamb's Quarters 1:20 w/v, HS 0.3 ml Nettle 1:20 w/v, HS 0.3 ml Pigweed, Rough Redroot 1:20 w/v, HS 0.3 ml Ragweed, Mix (Giant, Short) 1:20 w/v, HS 0.3 ml Sagebrush, Mugwort 1:20 w/v, HS 0.3 ml Sorrel, Sheep 1:20 w/v, HS 0.3 ml Diluent: HSA qs to 5ml    Chronic seasonal allergic rhinitis due to pollen, Need for desensitization to allergens       * ORDER FOR ALLERGEN IMMUNOTHERAPY 5 mL vial     5 mL    Name of Mix: Mix #4  Mold, Cat Cat Hair, Standardized 10,000 BAU/mL, ALK  1.0 ml  Alternaria 10,000 pnu/mL (1:20 w/v), ALK  0.2 ml Diluent: HSA qs to 5ml    Chronic allergic rhinitis due to animal hair and dander       propranolol 10 MG tablet    INDERAL    60 tablet    Take 1 tablet (10 mg) by mouth once as needed    Anxiety       vitamin D3 2000 units Caps           * Notice:  This list has 4 medication(s) that are the same as other medications prescribed for you. Read the directions carefully, and ask your doctor or other care provider to review them with you.

## 2018-09-19 NOTE — NURSING NOTE
Chief Complaint   Patient presents with     RECHECK     Allergy Shot     Patient instructed to wait in building for at least 30 min. after injections  Patient came in for weekly Allergy injection. Patient had no reaction to last weeks shots. I gave 0.5 ml of the red bottle x4 sub cutaneous without any issues.   Brett Orosco comes into clinic today at the request of [facility] for allergy injections    No reaction to last injection.    This service provided today was under the direct supervision of Dr. Bermeo, who was available if needed.    Kathy Rogers, CMA

## 2018-11-19 ENCOUNTER — ALLIED HEALTH/NURSE VISIT (OUTPATIENT)
Dept: PULMONOLOGY | Facility: CLINIC | Age: 28
End: 2018-11-19
Payer: COMMERCIAL

## 2018-11-19 DIAGNOSIS — Z51.6 NEED FOR DESENSITIZATION TO ALLERGENS: Primary | ICD-10-CM

## 2018-11-19 PROCEDURE — 95117 IMMUNOTHERAPY INJECTIONS: CPT | Mod: ZF

## 2018-11-19 NOTE — MR AVS SNAPSHOT
After Visit Summary   11/19/2018    Brett Orosco    MRN: [id number]           Patient Information     Date Of Birth          1990        Visit Information        Provider Department      11/19/2018 1:30 PM [facility] NURSE [facility]        Today's Diagnoses     Need for desensitization to allergens    -  1       Follow-ups after your visit        Who to contact     If you have questions or need follow up information about today's clinic visit or your schedule please contact [facility] directly at [phone].  Normal or non-critical lab and imaging results will be communicated to you by [employer], letter or phone within 4 business days after the clinic has received the results. If you do not hear from us within 7 days, please contact the clinic through Depositphotost or phone. If you have a critical or abnormal lab result, we will notify you by phone as soon as possible.  Submit refill requests through Velteo or call your pharmacy and they will forward the refill request to us. Please allow 3 business days for your refill to be completed.          Additional Information About Your Visit        MyChart Information     Velteo gives you secure access to your electronic health record. If you see a primary care provider, you can also send messages to your care team and make appointments. If you have questions, please call your primary care clinic.  If you do not have a primary care provider, please call [phone] and they will assist you.        Care EveryWhere ID     This is your Care EveryWhere ID. This could be used by other organizations to access your [place] medical records  [id number]         Blood Pressure from Last 3 Encounters:   01/16/18 126/74   11/15/17 107/70   04/10/17 104/67    Weight from Last 3 Encounters:   01/16/18 74.6 kg (164 lb 8 oz)   11/15/17 74.8 kg (165 lb)   04/10/17 77.1 kg (170 lb)              Today, you  had the following     No orders found for display       Primary Care Provider Office Phone # Fax #    Robert Sher [phone]       [address] [address]        Equal Access to Services     HUNTER TORRES : Hadii sheri rogers michaelao Sovioletaali, waaxda luqadaha, qaybta kaalmada ademichelleyada, baldomero hilarion naida hahn cristobal . So [facility] [phone].    ATENCIÓN: Si habla español, tiene a marquez disposición servicios gratuitos de asistencia lingüística. [facility] [phone].    We comply with applicable federal civil rights laws and [place] laws. We do not discriminate on the basis of race, color, national origin, age, disability, sex, sexual orientation, or gender identity.            Thank you!     Thank you for choosing [facility]  for your care. Our goal is always to provide you with excellent care. Hearing back from our patients is one way we can continue to improve our services. Please take a few minutes to complete the written survey that you may receive in the mail after your visit with us. Thank you!             Your Updated Medication List - Protect others around you: Learn how to safely use, store and throw away your medicines at www.disposemymeds.org.          This list is accurate as of 11/19/18  2:02 PM.  Always use your most recent med list.                   Brand Name Dispense Instructions for use Diagnosis    azelastine 0.1 % nasal spray    ASTELIN    1 Bottle    Spray 1-2 sprays into both nostrils 2 times daily    Seasonal allergic rhinitis due to pollen       CLARITIN PO      Take 10 mg by mouth as needed        COMPOUNDED NON-CONTROLLED SUBSTANCE - PHARMACY TO MIX COMPOUNDED MEDICATION    CMPD RX    3 vial    Per MD instruction    Need for desensitization to allergens       fluticasone 50 MCG/ACT spray    FLONASE    1 Bottle    Spray 2 sprays into both nostrils daily    Chronic rhinitis       hydrOXYzine 25 MG capsule     VISTARIL    60 capsule    Take 1 capsule (25 mg) by mouth nightly as needed for anxiety    Anxiety       * ORDER FOR ALLERGEN IMMUNOTHERAPY 5 mL vial     5 mL    Name of Mix: Mix #1  Grass, Tree  Piol, White  GLY 1:20 w/v, HS  0.3 ml Birch Mix GLY 1:20 w/v, HS  0.3 ml Elm, American GLY 1:20 w/v, HS  0.3 ml [place], Shagbark GLY 1:20 w/v, HS  0.3 ml Maple, Hard/Sugar GLY 1:20 w/v, HS  0.3 ml Oak Mix RVW GLY 1:20 w/v, HS 0.1 ml Renato (std) 100,000 BAU/mL Gly, HS 0.2 ml Diluent: HSA qs to 5ml    Chronic seasonal allergic rhinitis due to pollen       * ORDER FOR ALLERGEN IMMUNOTHERAPY 5 mL vial     5 mL    Name of Mix: Mix #1  Mold Cladosporium Nrewelisbhphzc80,000 pnu/mL (1:20 w/v), ALK  0.2 ml Diluent: HSA qs to 5ml    Chronic seasonal allergic rhinitis due to fungal spores       * ORDER FOR ALLERGEN IMMUNOTHERAPY 5 mL vial     5 mL    Name of Mix: Mix #3  Dust Mite, Dog, Weeds Dog Hair-Dander, A. P.  1:100 w/v, HS  0.3 ml Dust Mites DF. 10,000 AU/mL, HS  0.5 ml  Lamb's Quarters 1:20 w/v, HS 0.3 ml Nettle 1:20 w/v, HS 0.3 ml Pigweed, Rough Redroot 1:20 w/v, HS 0.3 ml Ragweed, Mix (Giant, Short) 1:20 w/v, HS 0.3 ml Sagebrush, Mugwort 1:20 w/v, HS 0.3 ml Sorrel, Sheep 1:20 w/v, HS 0.3 ml Diluent: HSA qs to 5ml    Chronic seasonal allergic rhinitis due to pollen, Need for desensitization to allergens       * ORDER FOR ALLERGEN IMMUNOTHERAPY 5 mL vial     5 mL    Name of Mix: Mix #4  Mold, Cat Cat Hair, Standardized 10,000 BAU/mL, ALK  1.0 ml  Alternaria 10,000 pnu/mL (1:20 w/v), ALK  0.2 ml Diluent: HSA qs to 5ml    Chronic allergic rhinitis due to animal hair and dander       propranolol 10 MG tablet    INDERAL    60 tablet    Take 1 tablet (10 mg) by mouth once as needed    Anxiety       vitamin D3 2000 units Caps           * Notice:  This list has 4 medication(s) that are the same as other medications prescribed for you. Read the directions carefully, and ask your doctor or other care provider to review them with  you.

## 2018-11-19 NOTE — NURSING NOTE
Chief Complaint   Patient presents with     Allergy Injection     monthly allergy injections      Patient came in for monthly Allergy injection. Patient had no reaction to last weeks shots. I gave 0.4 ml of the red bottle sub X 4 cutaneous without any issues.   Patient instructed to wait in building for at least 30 min. after injections  Brett Orosco comes into clinic today at the request of [facility] for allergy injections    No reaction to last injection.    This service provided today was under the direct supervision of Dr. Rivera, who was available if needed.    Veronique Berrios, CMA

## 2018-12-17 ENCOUNTER — OFFICE VISIT (OUTPATIENT)
Dept: PULMONOLOGY | Facility: CLINIC | Age: 28
End: 2018-12-17
Attending: ALLERGY & IMMUNOLOGY
Payer: COMMERCIAL

## 2018-12-17 VITALS
DIASTOLIC BLOOD PRESSURE: 73 MMHG | HEIGHT: 73 IN | HEART RATE: 85 BPM | BODY MASS INDEX: 23.58 KG/M2 | RESPIRATION RATE: 16 BRPM | SYSTOLIC BLOOD PRESSURE: 112 MMHG | WEIGHT: 177.9 LBS | OXYGEN SATURATION: 96 %

## 2018-12-17 DIAGNOSIS — J30.1 SEASONAL ALLERGIC RHINITIS DUE TO POLLEN: ICD-10-CM

## 2018-12-17 DIAGNOSIS — J31.0 CHRONIC RHINITIS: ICD-10-CM

## 2018-12-17 PROCEDURE — G0463 HOSPITAL OUTPT CLINIC VISIT: HCPCS | Mod: ZF

## 2018-12-17 PROCEDURE — 95117 IMMUNOTHERAPY INJECTIONS: CPT | Mod: ZF

## 2018-12-17 RX ORDER — AZELASTINE 1 MG/ML
1 SPRAY, METERED NASAL 2 TIMES DAILY
Qty: 1 BOTTLE | Refills: 3 | Status: SHIPPED | OUTPATIENT
Start: 2018-12-17 | End: 2019-02-25

## 2018-12-17 RX ORDER — FLUTICASONE PROPIONATE 50 MCG
2 SPRAY, SUSPENSION (ML) NASAL DAILY
Qty: 3 BOTTLE | Refills: 1 | Status: SHIPPED | OUTPATIENT
Start: 2018-12-17 | End: 2019-02-25

## 2018-12-17 ASSESSMENT — MIFFLIN-ST. JEOR: SCORE: 1820.7

## 2018-12-17 ASSESSMENT — PAIN SCALES - GENERAL: PAINLEVEL: NO PAIN (0)

## 2018-12-17 NOTE — PROGRESS NOTES
Reason for Visit  Brett Orosco is a 28 year old male who is here for follow-up for allergies.    Allergy HPI  He is breathing in his nose better.  Using 2 sprays in each nostril.  No allergy pills.  He tried singulair and gave up after a week.  His breathing has been good.  No pills.      The patient was seen and examined by Charlie Boggs MD   Current Outpatient Medications   Medication     azelastine (ASTELIN) 0.1 % spray     Cholecalciferol (VITAMIN D3) 2000 UNITS CAPS     COMPOUND (CMPD RX) - PHARMACY TO MIX COMPOUNDED MEDICATION     Loratadine (CLARITIN PO)     ORDER FOR ALLERGEN IMMUNOTHERAPY     ORDER FOR ALLERGEN IMMUNOTHERAPY     ORDER FOR ALLERGEN IMMUNOTHERAPY     ORDER FOR ALLERGEN IMMUNOTHERAPY     propranolol (INDERAL) 10 MG tablet     fluticasone (FLONASE) 50 MCG/ACT spray     hydrOXYzine (VISTARIL) 25 MG capsule     Current Facility-Administered Medications   Medication     cetirizine (zyrTEC) tablet 10 mg     hydrocortisone (CORTAID) 1 % cream     Allergies   Allergen Reactions     Cats      Seasonal Allergies Other (See Comments)     Social History     Socioeconomic History     Marital status: Single     Spouse name: Not on file     Number of children: Not on file     Years of education: Not on file     Highest education level: Not on file   Social Needs     Financial resource strain: Not on file     Food insecurity - worry: Not on file     Food insecurity - inability: Not on file     Transportation needs - medical: Not on file     Transportation needs - non-medical: Not on file   Occupational History     Not on file   Tobacco Use     Smoking status: Never Smoker     Smokeless tobacco: Never Used   Substance and Sexual Activity     Alcohol use: No     Alcohol/week: 0.0 oz     Drug use: No     Comment: marijuana in the past     Sexual activity: Yes     Partners: Female   Other Topics Concern     Parent/sibling w/ CABG, MI or angioplasty before 65F 55M? Not Asked   Social History  "Narrative    Works in [employer]     Significant other female     Past Medical History:   Diagnosis Date     Clubbing of nail      LILI (obstructive sleep apnea)      Seasonal allergies     requires immunotherapy     Past Surgical History:   Procedure Laterality Date     left arm fracture      open repair     TONSILLECTOMY & ADENOIDECTOMY  1993     Family History   Problem Relation Age of Onset     Lupus Mother 30     Lupus Maternal Aunt 38     Skin Cancer Cousin      Melanoma Other          ROS   A complete ROS was otherwise negative except as noted in the HPI.  /73 (BP Location: Right arm, Patient Position: Chair, Cuff Size: Adult Regular)   Pulse 85   Resp 16   Ht 1.854 m (6' 0.99\")   Wt 80.7 kg (177 lb 14.4 oz)   SpO2 96%   BMI 23.48 kg/m    Exam:   GENERAL APPEARANCE: Well developed, well nourished, alert, and in no apparent distress.  EYES: EOMI, conjunctiva clear non-injected  HENT: Nasal mucosa with no edema and no discharge. No nasal polyps.    EARS: Canals clear, TMs normal.  MOUTH: Oral mucosa is moist, without any lesions, no tonsillar enlargement, no oropharyngeal exudate.  RESP: Good air flow throughout.  No crackles. No rhonchi. No wheezes.  CV: Normal S1, S2, regular rhythm, normal rate. No murmur.  No rub. No gallop. No LE edema.   MS: Extremities normal. No clubbing. No cyanosis.  SKIN: No rashes noted.  NEURO: Speech normal, normal strength and tone, normal gait and stance  PSYCH: Normal mentation, orientation to person, place, and time.  Results:      Assessment and plan: Reached top dose November 2017 then was late for 3 months.    We will continue at least 3 years on the top dose.  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    He takes Propanolol but holds it on days of shots.     Follow up mid July before he goes to med school.             "

## 2018-12-17 NOTE — NURSING NOTE
Chief Complaint   Patient presents with     RECHECK     annual follow up      Veronique Berrios CMA

## 2018-12-17 NOTE — LETTER
12/17/2018       RE: Brett Orosco  [address]  [place] [address]     Dear Colleague,    Thank you for referring your patient, Brett Orosco, to the [facility] at [facility]. Please see a copy of my visit note below.    Reason for Visit  Brett Orosco is a 28 year old male who is here for follow-up for allergies.    Allergy HPI  He is breathing in his nose better.  Using 2 sprays in each nostril.  No allergy pills.  He tried singulair and gave up after a week.  His breathing has been good.  No pills.      The patient was seen and examined by Charlie Boggs MD   Current Outpatient Medications   Medication     azelastine (ASTELIN) 0.1 % spray     Cholecalciferol (VITAMIN D3) 2000 UNITS CAPS     COMPOUND (CMPD RX) - PHARMACY TO MIX COMPOUNDED MEDICATION     Loratadine (CLARITIN PO)     ORDER FOR ALLERGEN IMMUNOTHERAPY     ORDER FOR ALLERGEN IMMUNOTHERAPY     ORDER FOR ALLERGEN IMMUNOTHERAPY     ORDER FOR ALLERGEN IMMUNOTHERAPY     propranolol (INDERAL) 10 MG tablet     fluticasone (FLONASE) 50 MCG/ACT spray     hydrOXYzine (VISTARIL) 25 MG capsule     Current Facility-Administered Medications   Medication     cetirizine (zyrTEC) tablet 10 mg     hydrocortisone (CORTAID) 1 % cream     Allergies   Allergen Reactions     Cats      Seasonal Allergies Other (See Comments)     Social History     Socioeconomic History     Marital status: Single     Spouse name: Not on file     Number of children: Not on file     Years of education: Not on file     Highest education level: Not on file   Social Needs     Financial resource strain: Not on file     Food insecurity - worry: Not on file     Food insecurity - inability: Not on file     Transportation needs - medical: Not on file     Transportation needs - non-medical: Not on file   Occupational History     Not on file   Tobacco Use     Smoking status: Never Smoker      "Smokeless tobacco: Never Used   Substance and Sexual Activity     Alcohol use: No     Alcohol/week: 0.0 oz     Drug use: No     Comment: marijuana in the past     Sexual activity: Yes     Partners: Female   Other Topics Concern     Parent/sibling w/ CABG, MI or angioplasty before 65F 55M? Not Asked   Social History Narrative    Works in [employer]     Significant other female     Past Medical History:   Diagnosis Date     Clubbing of nail      LILI (obstructive sleep apnea)      Seasonal allergies     requires immunotherapy     Past Surgical History:   Procedure Laterality Date     left arm fracture      open repair     TONSILLECTOMY & ADENOIDECTOMY  1993     Family History   Problem Relation Age of Onset     Lupus Mother 30     Lupus Maternal Aunt 38     Skin Cancer Cousin      Melanoma Other          ROS   A complete ROS was otherwise negative except as noted in the HPI.  /73 (BP Location: Right arm, Patient Position: Chair, Cuff Size: Adult Regular)   Pulse 85   Resp 16   Ht 1.854 m (6' 0.99\")   Wt 80.7 kg (177 lb 14.4 oz)   SpO2 96%   BMI 23.48 kg/m     Exam:   GENERAL APPEARANCE: Well developed, well nourished, alert, and in no apparent distress.  EYES: EOMI, conjunctiva clear non-injected  HENT: Nasal mucosa with no edema and no discharge. No nasal polyps.    EARS: Canals clear, TMs normal.  MOUTH: Oral mucosa is moist, without any lesions, no tonsillar enlargement, no oropharyngeal exudate.  RESP: Good air flow throughout.  No crackles. No rhonchi. No wheezes.  CV: Normal S1, S2, regular rhythm, normal rate. No murmur.  No rub. No gallop. No LE edema.   MS: Extremities normal. No clubbing. No cyanosis.  SKIN: No rashes noted.  NEURO: Speech normal, normal strength and tone, normal gait and stance  PSYCH: Normal mentation, orientation to person, place, and time.  Results:      Assessment and plan: Reached top dose November 2017 then was late for 3 months.    We will continue at " least 3 years on the top dose.  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    He takes Propanolol but holds it on days of shots.     Follow up mid July before he goes to med school.               Again, thank you for allowing me to participate in the care of your patient.      Sincerely,    Charlie Boggs MD

## 2018-12-17 NOTE — NURSING NOTE
Patient came in for monthly Allergy injection. Patient had no reaction to last weeks shots. I gave 0.5 ml of the red bottle sub X 4 cutaneous without any issues.   Patient instructed to wait in building for at least 30 min. after injections  Brett Orosco comes into clinic today at the request of [facility] for allergy injections    No reaction to last injection.    This service provided today was under the direct supervision of Dr. Boggs , who was available if needed.    Veronique Berrios, CMA

## 2019-01-17 ENCOUNTER — MYC MEDICAL ADVICE (OUTPATIENT)
Dept: FAMILY MEDICINE | Facility: CLINIC | Age: 29
End: 2019-01-17

## 2019-01-17 ENCOUNTER — MYC REFILL (OUTPATIENT)
Dept: FAMILY MEDICINE | Facility: CLINIC | Age: 29
End: 2019-01-17

## 2019-01-17 DIAGNOSIS — F41.9 ANXIETY: ICD-10-CM

## 2019-01-17 RX ORDER — PROPRANOLOL HYDROCHLORIDE 10 MG/1
10 TABLET ORAL
Qty: 60 TABLET | Refills: 0 | Status: CANCELLED | OUTPATIENT
Start: 2019-01-17

## 2019-01-17 RX ORDER — PROPRANOLOL HYDROCHLORIDE 10 MG/1
10 TABLET ORAL
Qty: 10 TABLET | Refills: 0 | Status: SHIPPED | OUTPATIENT
Start: 2019-01-17 | End: 2019-02-18

## 2019-01-17 RX ORDER — PROPRANOLOL HYDROCHLORIDE 10 MG/1
10 TABLET ORAL
Qty: 10 TABLET | Refills: 0 | Status: SHIPPED | OUTPATIENT
Start: 2019-01-17 | End: 2019-01-17

## 2019-01-17 NOTE — TELEPHONE ENCOUNTER
Last Clinic Visit: 1/16/2018  [facility]  Scheduling has been notified to contact the pt for appointment.

## 2019-01-23 ENCOUNTER — ALLIED HEALTH/NURSE VISIT (OUTPATIENT)
Dept: PULMONOLOGY | Facility: CLINIC | Age: 29
End: 2019-01-23
Attending: ALLERGY & IMMUNOLOGY
Payer: COMMERCIAL

## 2019-01-23 DIAGNOSIS — Z51.6 NEED FOR DESENSITIZATION TO ALLERGENS: ICD-10-CM

## 2019-01-23 PROCEDURE — 95117 IMMUNOTHERAPY INJECTIONS: CPT | Mod: ZF

## 2019-01-23 NOTE — NURSING NOTE
Chief Complaint   Patient presents with     Allied Health Visit     allgery injection     Patient came in for monthly Allergy injection. Patient had no reaction to last weeks shots. I gave 0.5 ml of the red bottle sub X 4 cutaneous without any issues.   Patient instructed to wait in building for at least 30 min. after injections  Brett Orosco comes into clinic today at the request of [facility] for allergy injections    No reaction to last injection.    This service provided today was under the direct supervision of Dr. Sofi Ramirez, who was available if needed.    Veronique Berrios, CMA

## 2019-02-18 ENCOUNTER — OFFICE VISIT (OUTPATIENT)
Dept: FAMILY MEDICINE | Facility: CLINIC | Age: 29
End: 2019-02-18
Payer: COMMERCIAL

## 2019-02-18 ENCOUNTER — TELEPHONE (OUTPATIENT)
Dept: PULMONOLOGY | Facility: CLINIC | Age: 29
End: 2019-02-18

## 2019-02-18 VITALS
DIASTOLIC BLOOD PRESSURE: 70 MMHG | WEIGHT: 175 LBS | HEIGHT: 73 IN | BODY MASS INDEX: 23.19 KG/M2 | HEART RATE: 69 BPM | OXYGEN SATURATION: 96 % | RESPIRATION RATE: 16 BRPM | SYSTOLIC BLOOD PRESSURE: 111 MMHG | TEMPERATURE: 98.5 F

## 2019-02-18 DIAGNOSIS — R93.1 DECREASED CARDIAC EJECTION FRACTION: ICD-10-CM

## 2019-02-18 DIAGNOSIS — E55.9 VITAMIN D DEFICIENCY: ICD-10-CM

## 2019-02-18 DIAGNOSIS — R09.81 NASAL CONGESTION: ICD-10-CM

## 2019-02-18 DIAGNOSIS — R68.3 CLUBBING OF NAILS: ICD-10-CM

## 2019-02-18 DIAGNOSIS — F41.9 ANXIETY: Primary | ICD-10-CM

## 2019-02-18 DIAGNOSIS — J30.89 SEASONAL ALLERGIC RHINITIS DUE TO OTHER ALLERGIC TRIGGER: ICD-10-CM

## 2019-02-18 RX ORDER — LORATADINE 10 MG/1
10 TABLET ORAL DAILY PRN
Qty: 90 TABLET | Refills: 3 | Status: SHIPPED | OUTPATIENT
Start: 2019-02-18 | End: 2020-02-18

## 2019-02-18 RX ORDER — PROPRANOLOL HYDROCHLORIDE 10 MG/1
10 TABLET ORAL
Qty: 90 TABLET | Refills: 3 | Status: SHIPPED | OUTPATIENT
Start: 2019-02-18

## 2019-02-18 ASSESSMENT — ANXIETY QUESTIONNAIRES
7. FEELING AFRAID AS IF SOMETHING AWFUL MIGHT HAPPEN: SEVERAL DAYS
3. WORRYING TOO MUCH ABOUT DIFFERENT THINGS: SEVERAL DAYS
2. NOT BEING ABLE TO STOP OR CONTROL WORRYING: MORE THAN HALF THE DAYS
6. BECOMING EASILY ANNOYED OR IRRITABLE: NOT AT ALL
5. BEING SO RESTLESS THAT IT IS HARD TO SIT STILL: NOT AT ALL
GAD7 TOTAL SCORE: 7
IF YOU CHECKED OFF ANY PROBLEMS ON THIS QUESTIONNAIRE, HOW DIFFICULT HAVE THESE PROBLEMS MADE IT FOR YOU TO DO YOUR WORK, TAKE CARE OF THINGS AT HOME, OR GET ALONG WITH OTHER PEOPLE: SOMEWHAT DIFFICULT
1. FEELING NERVOUS, ANXIOUS, OR ON EDGE: MORE THAN HALF THE DAYS

## 2019-02-18 ASSESSMENT — PATIENT HEALTH QUESTIONNAIRE - PHQ9
5. POOR APPETITE OR OVEREATING: SEVERAL DAYS
SUM OF ALL RESPONSES TO PHQ QUESTIONS 1-9: 1

## 2019-02-18 ASSESSMENT — MIFFLIN-ST. JEOR: SCORE: 1807.54

## 2019-02-18 ASSESSMENT — PAIN SCALES - GENERAL: PAINLEVEL: NO PAIN (0)

## 2019-02-18 NOTE — PROGRESS NOTES
[facility]   Robert Sher MD  02/18/2019      Chief Complaint:   Recheck Medication     History of Present Illness:   Brett Orosco is a 28 year old male with a history of  Mild Obstructive Sleep Apnea, mildly decreased cardiac ejection fraction and seasonal allergies who presents for medication recheck.    Anxiety  Reports that he is tolerating the propanolol 10 mg intermittently well and feels that it works well for controlling his anxiety. He takes it about 1 tablet per week--it is for performance anxiety, essentially. Would like refills on this, more than the ten tablets he received last time. He did not take any of this today.     Seasonal Allergies  He is seeing an allergist and has been receiving allergy shots. Reports that he was counseled not to take his propanolol prior to his injection appointments. In terms of other respiratory concerns, he was diagnosed with sleep apnea in [place], and did have a CPAP but this made his sleep worse. With treatment for his allergies, his trouble breathing while sleeping has gotten better. Brett took Claritin in the past and is wondering if he should restart taking it.     Clubbing of nail  Reports that his family's nails and finger tips do not appear like his do. He has rounded fingertips, with  Slight swelling.     Healthcare Maintenance  He is wanting to check his lipids and glucose levels in a general annual panel of labs.     History of decreased cardiac ejection fraction  See below for report. He is wondering if his increased cardio since then could have improved his ejection fraction. He has not seen a cardiologist after getting the echo done, but is willing to if indicated. He has been working out and feels he is in better shape than the past.    Echocardiogram with two-dimensional, color and spectral Doppler performed 12/06/2016.  Interpretation Summary  Borderline (EF 50-55%) reduced left ventricular function is present.  Left  ventricular size is normal. Left ventricular wall thickness is normal. Normal  left ventricular filling for age.  The right ventricle is normal size. Global right ventricular function is  normal.  Both atria appear normal.  Pulmonary artery systolic pressure cannot be assessed.  The inferior vena cava was normal in size with preserved respiratory  variability.  No pericardial effusion is present.  The atrial septum is intact as assessed by color Doppler .    Review of Systems:   Pertinent items are noted in HPI, remainder of complete ROS is negative.      Active Medications:      azelastine (ASTELIN) 0.1 % nasal spray, Spray 1 spray into both nostrils 2 times daily, Disp: 1 Bottle, Rfl: 3     Cholecalciferol (VITAMIN D3) 2000 UNITS CAPS, , Disp: , Rfl:      fluticasone (FLONASE) 50 MCG/ACT nasal spray, Spray 2 sprays into both nostrils daily, Disp: 3 Bottle, Rfl: 1     propranolol (INDERAL) 10 MG tablet, Take 1 tablet (10 mg) by mouth once as needed, Disp: 10 tablet, Rfl: 0     COMPOUND (CMPD RX) - PHARMACY TO MIX COMPOUNDED MEDICATION, Per MD instruction (Patient not taking: Reported on 2/18/2019), Disp: 3 vial, Rfl: 0     Loratadine (CLARITIN PO), Take 10 mg by mouth as needed, Disp: , Rfl:      ORDER FOR ALLERGEN IMMUNOTHERAPY, Name of Mix: Mix #4  Mold, Cat Cat Hair, Standardized 10,000 BAU/mL, ALK  1.0 ml  Alternaria 10,000 pnu/mL (1:20 w/v), ALK  0.2 ml Diluent: HSA qs to 5ml (Patient not taking: Reported on 2/18/2019), Disp: 5 mL, Rfl: PRN     ORDER FOR ALLERGEN IMMUNOTHERAPY, Name of Mix: Mix #3  Dust Mite, Dog, Weeds Dog Hair-Dander, A. P.  1:100 w/v, HS  0.3 ml Dust Mites DF. 10,000 AU/mL, HS  0.5 ml  Lamb's Quarters 1:20 w/v, HS 0.3 ml Nettle 1:20 w/v, HS 0.3 ml Pigweed, Rough Redroot 1:20 w/v, HS 0.3 ml Ragweed, Mix (Giant, Short) 1:20 w/v, HS 0.3 ml Sagebrush, Mugwort 1:20 w/v, HS 0.3 ml Sorrel, Sheep 1:20 w/v, HS 0.3 ml Diluent: HSA qs to 5ml (Patient not taking: Reported on 2/18/2019), Disp: 5  "mL, Rfl: PRN     ORDER FOR ALLERGEN IMMUNOTHERAPY, Name of Mix: Mix #1  Grass, Tree  Pilo, White  GLY 1:20 w/v, HS  0.3 ml Birch Mix GLY 1:20 w/v, HS  0.3 ml Elm, American GLY 1:20 w/v, HS  0.3 ml Iosco, Shagbark GLY 1:20 w/v, HS  0.3 ml Maple, Hard/Sugar GLY 1:20 w/v, HS  0.3 ml Oak Mix RVW GLY 1:20 w/v, HS 0.1 ml Renato (std) 100,000 BAU/mL Gly, HS 0.2 ml Diluent: HSA qs to 5ml (Patient not taking: Reported on 2/18/2019), Disp: 5 mL, Rfl: PRN     ORDER FOR ALLERGEN IMMUNOTHERAPY, Name of Mix: Mix #1  Mold Cladosporium Fpouohbecblebk72,000 pnu/mL (1:20 w/v), ALK  0.2 ml Diluent: HSA qs to 5ml (Patient not taking: Reported on 2/18/2019), Disp: 5 mL, Rfl: PRN     Allergies:   Cats and Seasonal allergies      Past Medical History:  Clubbing of nail  Obstructive Sleep Apnea   Seasonal allergies   Decreased cardiac ejection fraction  Need for desensitization to allergens      Past Surgical History:  Left arm repair   Tonsillectomy & Adenoidectomy      Family History:   Mother - lupus   Maternal aunt - lupus  Cousin - skin cancer       Social History:   The patient was alone.  Smoking Status: Never   Smokeless Tobacco: Never   Alcohol Use: No    Marital Status: Girl friend. He has been interviewing for  Medical school and was accepted in [employer].      Physical Exam:   /70 (BP Location: Right arm, Patient Position: Sitting, Cuff Size: Adult Regular)   Pulse 69   Temp 98.5  F (36.9  C) (Tympanic)   Resp 16   Ht 1.854 m (6' 0.99\")   Wt 79.4 kg (175 lb)   SpO2 96%   BMI 23.09 kg/m       GENERAL APPEARANCE: healthy, alert and no distress, smiling   EYES: Eyes grossly normal to inspection, PERRL and conjunctivae and sclerae normal  HENT: nose with erythematous swollen turbinates, no lesions, consistent with allergic rhinitis and mouth without ulcers or lesions, oropharynx clear and oral mucous membranes moist  NECK: no adenopathy, no asymmetry, masses, or scars and thyroid normal to palpation  RESP: " lungs clear to auscultation - no rales, rhonchi or wheezes  CV: regular rates and rhythm, normal S1 S2, no S3 or S4, no murmur, click or rub. Radially pulses bilaterally symmetrical.  MS: no musculoskeletal defects are noted and gait is age appropriate without ataxia  SKIN: no suspicious lesions or rashes. Mild clubbing on fingers 2-4 of both hands   NEURO: Normal strength and tone, mentation intact and speech normal   deferred  PSYCH: mentation appears normal and affect normal/bright. Reviewed GAD7 and PHQ9 together.     PHQ-9 SCORE 4/5/2017 11/15/2017 2/18/2019   PHQ-9 Total Score 8 14 1       ALTA-7 SCORE 4/5/2017 11/15/2017 2/18/2019   Total Score 6 14 7      Assessment and Plan:  Brett Orosco is a 28 year old male with a history of  Mild Obstructive Sleep Apnea, mildly decreased cardiac ejection fraction 55% and seasonal allergies who presents for medication recheck. He would like to check his cholesterol levels, Vitamin D levels, and a metabolic panel today. These were ordered for him. He has not been fasting today, so these will have to be future labs.     Anxiety  He is tolerating his propanolol well, and it controls his anxiety. He would like a refill of this, and I will write for 90 tablets for him as he uses it once per week and is very careful with when and how he uses it, based on our discussion. His pulse rate has not been negatively impacted by taking it. Counseled on use of propanolol long term--he takes it about once per week at this time, so there is not a high concern for rebound tachycardia or hypotension d/t use of the propanolol. We discussed again that he should take 1 tablet per day when he does take it, and he understands.   GAD7 and PHQ9 stable. No medication changes today. We discussed that he should keep track of this, particularly if he moves for medical school.  - propranolol (INDERAL) 10 MG tablet  Dispense: 90 tablet; Refill: 3    Decreased cardiac ejection fraction  I would  like to repeat his echocardiogram to compare to his previous given his previous findings and increased cardio activity since then. He is comfortable with this plan, and we will follow up on the results.   - Echocardiogram Complete  - Lipid panel reflex to direct LDL Fasting    Vitamin D deficiency  - Vitamin D Deficiency    Clubbing of nails    Nasal congestion  - loratadine (CLARITIN) 10 MG tablet  Dispense: 90 tablet; Refill: 3  - OTOLARYNGOLOGY REFERRAL    Seasonal allergic rhinitis due to other allergic trigger  He continues to follow up with allergy for allergen injections. He has essentially continuous allergies. He may not use it, but is amenable to a referral to ENT for further evaluation of ongoing congestion and allergy symptoms. Referral placed.   - loratadine (CLARITIN) 10 MG tablet  Dispense: 90 tablet; Refill: 3  - OTOLARYNGOLOGY REFERRAL if needed    Follow-up: Return in about 1 year (around 2/18/2020).         Scribe Disclosure:   I, Cathy Warren, am serving as a scribe to document services personally performed by Robert Sher MD at this visit, based upon the provider's statements to me. All documentation has been reviewed by the aforementioned provider prior to being entered into the official medical record.     Portions of this medical record were completed by a scribe. UPON MY REVIEW AND AUTHENTICATION BY ELECTRONIC SIGNATURE, this confirms (a) I performed the applicable clinical services, and (b) the record is accurate.   Robert Sher MD

## 2019-02-18 NOTE — NURSING NOTE
Chief Complaint   Patient presents with     Recheck Medication     patient is here for medication discussions, would like to talk about getting cholesterol blood test       Debbie Cooper, EMT at 12:35 PM on 2/18/2019.

## 2019-02-18 NOTE — PATIENT INSTRUCTIONS
[facility] Medication Refill Request Information:  * Please contact your pharmacy regarding ANY request for medication refills.  ** [facility] Prescription Fax = [phone]  * Please allow 3 business days for routine medication refills.  * Please allow 5 business days for controlled substance medication refills.     [facility] Test Result notification information:  *You will be notified with in 7-10 days of your appointment day regarding the results of your test.  If you are on MyChart you will be notified as soon as the provider has reviewed the results and signed off on them.    [facility]: [phone]

## 2019-02-18 NOTE — TELEPHONE ENCOUNTER
Called pt per Dr. Charlie Rivera, for follow up appointment. Pt allergy vials have been combined into 3 shots.  Appointment made on 2/25/19 at 8:15am  Veronique Berrios CMA

## 2019-02-19 ASSESSMENT — ANXIETY QUESTIONNAIRES: GAD7 TOTAL SCORE: 7

## 2019-02-25 ENCOUNTER — OFFICE VISIT (OUTPATIENT)
Dept: PULMONOLOGY | Facility: CLINIC | Age: 29
End: 2019-02-25
Attending: ALLERGY & IMMUNOLOGY
Payer: COMMERCIAL

## 2019-02-25 ENCOUNTER — ANCILLARY PROCEDURE (OUTPATIENT)
Dept: CT IMAGING | Facility: CLINIC | Age: 29
End: 2019-02-25
Attending: ALLERGY & IMMUNOLOGY
Payer: COMMERCIAL

## 2019-02-25 VITALS
HEART RATE: 57 BPM | BODY MASS INDEX: 22.8 KG/M2 | DIASTOLIC BLOOD PRESSURE: 71 MMHG | OXYGEN SATURATION: 99 % | RESPIRATION RATE: 17 BRPM | WEIGHT: 172 LBS | HEIGHT: 73 IN | SYSTOLIC BLOOD PRESSURE: 108 MMHG

## 2019-02-25 DIAGNOSIS — J32.9 CHRONIC SINUSITIS, UNSPECIFIED LOCATION: ICD-10-CM

## 2019-02-25 DIAGNOSIS — J32.9 CHRONIC SINUSITIS, UNSPECIFIED LOCATION: Primary | ICD-10-CM

## 2019-02-25 DIAGNOSIS — J31.0 CHRONIC RHINITIS: ICD-10-CM

## 2019-02-25 DIAGNOSIS — Z51.6 NEED FOR DESENSITIZATION TO ALLERGENS: ICD-10-CM

## 2019-02-25 DIAGNOSIS — J30.1 SEASONAL ALLERGIC RHINITIS DUE TO POLLEN: ICD-10-CM

## 2019-02-25 DIAGNOSIS — J30.1 CHRONIC SEASONAL ALLERGIC RHINITIS DUE TO POLLEN: ICD-10-CM

## 2019-02-25 PROCEDURE — 95117 IMMUNOTHERAPY INJECTIONS: CPT | Mod: ZF

## 2019-02-25 PROCEDURE — G0463 HOSPITAL OUTPT CLINIC VISIT: HCPCS | Mod: 25

## 2019-02-25 RX ORDER — AZELASTINE 1 MG/ML
1 SPRAY, METERED NASAL 2 TIMES DAILY
Qty: 3 BOTTLE | Refills: 1 | Status: SHIPPED | OUTPATIENT
Start: 2019-02-25 | End: 2019-05-06

## 2019-02-25 RX ORDER — FLUTICASONE PROPIONATE 50 MCG
2 SPRAY, SUSPENSION (ML) NASAL DAILY
Qty: 3 BOTTLE | Refills: 1 | Status: SHIPPED | OUTPATIENT
Start: 2019-02-25

## 2019-02-25 ASSESSMENT — PAIN SCALES - GENERAL: PAINLEVEL: NO PAIN (0)

## 2019-02-25 ASSESSMENT — MIFFLIN-ST. JEOR: SCORE: 1794.07

## 2019-02-25 NOTE — NURSING NOTE
Chief Complaint   Patient presents with     RECHECK     Allergy    Kathy Rogers CMA  Patient instructed to wait in building for at least 30 min. after injections  Patient came in for weekly Allergy injection. Patient had no reaction to last weeks shots. I gave 0.5 ml of the red bottle x3 sub cutaneous without any issues.   Brett Orosco comes into clinic today at the request of [facility] for allergy injections    No reaction to last injection.    This service provided today was under the direct supervision of Dr. Rivera, who was available if needed.    Kathy Rogers CMA

## 2019-02-25 NOTE — PROGRESS NOTES
Reason for Visit  Brett Orosco is a 28 year old male who is here for follow-up for allergies.    Allergy HPI  Occ feels like a lump after shots but no raised area.  He is back to once a month shots.  The last 2 months has been more congested in the nose. He is doing azelastine and fluticasone and they help but after he feels maybe worse after a few hours.  He started taking Claritin and helps a little.    Has no cats, will move to [place] or [place] this summer.     The patient was seen and examined by Charlie Boggs MD   Current Outpatient Medications   Medication     azelastine (ASTELIN) 0.1 % nasal spray     Cholecalciferol (VITAMIN D3) 2000 UNITS CAPS     fluticasone (FLONASE) 50 MCG/ACT nasal spray     loratadine (CLARITIN) 10 MG tablet     propranolol (INDERAL) 10 MG tablet     COMPOUND (CMPD RX) - PHARMACY TO MIX COMPOUNDED MEDICATION     ORDER FOR ALLERGEN IMMUNOTHERAPY     ORDER FOR ALLERGEN IMMUNOTHERAPY     ORDER FOR ALLERGEN IMMUNOTHERAPY     ORDER FOR ALLERGEN IMMUNOTHERAPY     Current Facility-Administered Medications   Medication     hydrocortisone (CORTAID) 1 % cream     Allergies   Allergen Reactions     Cats      Seasonal Allergies Other (See Comments)     Social History     Socioeconomic History     Marital status: Single     Spouse name: Not on file     Number of children: Not on file     Years of education: Not on file     Highest education level: Not on file   Occupational History     Not on file   Social Needs     Financial resource strain: Not on file     Food insecurity:     Worry: Not on file     Inability: Not on file     Transportation needs:     Medical: Not on file     Non-medical: Not on file   Tobacco Use     Smoking status: Never Smoker     Smokeless tobacco: Never Used   Substance and Sexual Activity     Alcohol use: No     Alcohol/week: 0.0 oz     Drug use: No     Comment: marijuana in the past     Sexual activity: Yes     Partners: Female   Lifestyle     Physical  "activity:     Days per week: Not on file     Minutes per session: Not on file     Stress: Not on file   Relationships     Social connections:     Talks on phone: Not on file     Gets together: Not on file     Attends Gnosticist service: Not on file     Active member of club or organization: Not on file     Attends meetings of clubs or organizations: Not on file     Relationship status: Not on file     Intimate partner violence:     Fear of current or ex partner: Not on file     Emotionally abused: Not on file     Physically abused: Not on file     Forced sexual activity: Not on file   Other Topics Concern     Parent/sibling w/ CABG, MI or angioplasty before 65F 55M? Not Asked   Social History Narrative    Works in [employer] Anest[employer]     Significant other female     Past Medical History:   Diagnosis Date     Clubbing of nail      LILI (obstructive sleep apnea)      Seasonal allergies     requires immunotherapy     Past Surgical History:   Procedure Laterality Date     left arm fracture      open repair     TONSILLECTOMY & ADENOIDECTOMY  1993     Family History   Problem Relation Age of Onset     Lupus Mother 30     Lupus Maternal Aunt 38     Skin Cancer Cousin      Melanoma Other          ROS   A complete ROS was otherwise negative except as noted in the HPI.  /71   Pulse 57   Resp 17   Ht 1.854 m (6' 1\")   Wt 78 kg (172 lb)   SpO2 99%   BMI 22.69 kg/m    Exam:   GENERAL APPEARANCE: Well developed, well nourished, alert, and in no apparent distress.  EYES: EOMI, conjunctiva clear non-injected  HENT: Nasal mucosa with no edema and scant discharge. No nasal polyps.    EARS: Canals clear, TMs normal.  MOUTH: Oral mucosa is moist, without any lesions, no tonsillar enlargement, no oropharyngeal exudate.  RESP: Good air flow throughout.  No crackles. No rhonchi. No wheezes.  CV: Normal S1, S2, regular rhythm, normal rate. No murmur.  No rub. No gallop. No LE edema.   MS: Extremities normal. No clubbing. " No cyanosis.  SKIN: No rashes noted.  NEURO: Speech normal, normal strength and tone, normal gait and stance  PSYCH: Normal mentation, orientation to person, place, and time.  Results:      Assessment and plan:     Holding propanolol on days of shots, using it for anxiety.  We may increase your extract doses when we make new serum.  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    Continue azelastine and fluticasone and loratadine (claritin) as needed.    Sinus CT ordered today as he still has nasal congestion and is wondering if surgery will help as his sister had nasal surgery with some benefit.    Addendum:  Sinus CT basically normal.

## 2019-02-25 NOTE — LETTER
2/25/2019     RE: Brett Orosco  [address]  [place] [address]     Dear Colleague,    Thank you for referring your patient, Brett Orosco, to the [facility]. Please see a copy of my visit note below.    Reason for Visit  Brett Orosco is a 28 year old male who is here for follow-up for allergies.    Allergy HPI  Occ feels like a lump after shots but no raised area.  He is back to once a month shots.  The last 2 months has been more congested in the nose. He is doing azelastine and fluticasone and they help but after he feels maybe worse after a few hours.  He started taking Claritin and helps a little.    Has no cats, will move to [place] or [place] this summer.     The patient was seen and examined by Charlie Boggs MD   Current Outpatient Medications   Medication     azelastine (ASTELIN) 0.1 % nasal spray     Cholecalciferol (VITAMIN D3) 2000 UNITS CAPS     fluticasone (FLONASE) 50 MCG/ACT nasal spray     loratadine (CLARITIN) 10 MG tablet     propranolol (INDERAL) 10 MG tablet     COMPOUND (CMPD RX) - PHARMACY TO MIX COMPOUNDED MEDICATION     ORDER FOR ALLERGEN IMMUNOTHERAPY     ORDER FOR ALLERGEN IMMUNOTHERAPY     ORDER FOR ALLERGEN IMMUNOTHERAPY     ORDER FOR ALLERGEN IMMUNOTHERAPY     Current Facility-Administered Medications   Medication     hydrocortisone (CORTAID) 1 % cream     Allergies   Allergen Reactions     Cats      Seasonal Allergies Other (See Comments)     Social History     Socioeconomic History     Marital status: Single     Spouse name: Not on file     Number of children: Not on file     Years of education: Not on file     Highest education level: Not on file   Occupational History     Not on file   Social Needs     Financial resource strain: Not on file     Food insecurity:     Worry: Not on file     Inability: Not on file     Transportation needs:     Medical: Not on  "file     Non-medical: Not on file   Tobacco Use     Smoking status: Never Smoker     Smokeless tobacco: Never Used   Substance and Sexual Activity     Alcohol use: No     Alcohol/week: 0.0 oz     Drug use: No     Comment: marijuana in the past     Sexual activity: Yes     Partners: Female   Lifestyle     Physical activity:     Days per week: Not on file     Minutes per session: Not on file     Stress: Not on file   Relationships     Social connections:     Talks on phone: Not on file     Gets together: Not on file     Attends Faith service: Not on file     Active member of club or organization: Not on file     Attends meetings of clubs or organizations: Not on file     Relationship status: Not on file     Intimate partner violence:     Fear of current or ex partner: Not on file     Emotionally abused: Not on file     Physically abused: Not on file     Forced sexual activity: Not on file   Other Topics Concern     Parent/sibling w/ CABG, MI or angioplasty before 65F 55M? 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  /71   Pulse 57   Resp 17   Ht 1.854 m (6' 1\")   Wt 78 kg (172 lb)   SpO2 99%   BMI 22.69 kg/m     Exam:   GENERAL APPEARANCE: Well developed, well nourished, alert, and in no apparent distress.  EYES: EOMI, conjunctiva clear non-injected  HENT: Nasal mucosa with no edema and scant discharge. No nasal polyps.    EARS: Canals clear, " TMs normal.  MOUTH: Oral mucosa is moist, without any lesions, no tonsillar enlargement, no oropharyngeal exudate.  RESP: Good air flow throughout.  No crackles. No rhonchi. No wheezes.  CV: Normal S1, S2, regular rhythm, normal rate. No murmur.  No rub. No gallop. No LE edema.   MS: Extremities normal. No clubbing. No cyanosis.  SKIN: No rashes noted.  NEURO: Speech normal, normal strength and tone, normal gait and stance  PSYCH: Normal mentation, orientation to person, place, and time.  Results:      Assessment and plan:     Holding propanolol on days of shots, using it for anxiety.  We may increase your extract doses when we make new serum.  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    Continue azelastine and fluticasone and loratadine (claritin) as needed.    Sinus CT ordered today as he still has nasal congestion and is wondering if surgery will help as his sister had nasal surgery with some benefit.    Again, thank you for allowing me to participate in the care of your patient.      Sincerely,    Charlie Boggs MD

## 2019-02-27 ENCOUNTER — ANCILLARY PROCEDURE (OUTPATIENT)
Dept: CARDIOLOGY | Facility: CLINIC | Age: 29
End: 2019-02-27
Attending: FAMILY MEDICINE
Payer: COMMERCIAL

## 2019-02-27 DIAGNOSIS — R93.1 DECREASED CARDIAC EJECTION FRACTION: ICD-10-CM

## 2019-02-27 DIAGNOSIS — E55.9 VITAMIN D DEFICIENCY: ICD-10-CM

## 2019-02-27 LAB
CHOLEST SERPL-MCNC: 200 MG/DL
HDLC SERPL-MCNC: 52 MG/DL
LDLC SERPL CALC-MCNC: 130 MG/DL
NONHDLC SERPL-MCNC: 149 MG/DL
TRIGL SERPL-MCNC: 93 MG/DL

## 2019-02-28 LAB — DEPRECATED CALCIDIOL+CALCIFEROL SERPL-MC: 23 UG/L (ref 20–75)

## 2019-03-25 ENCOUNTER — ALLIED HEALTH/NURSE VISIT (OUTPATIENT)
Dept: PULMONOLOGY | Facility: CLINIC | Age: 29
End: 2019-03-25
Attending: ALLERGY & IMMUNOLOGY
Payer: COMMERCIAL

## 2019-03-25 DIAGNOSIS — Z51.6 NEED FOR DESENSITIZATION TO ALLERGENS: Primary | ICD-10-CM

## 2019-03-25 PROCEDURE — 95117 IMMUNOTHERAPY INJECTIONS: CPT | Mod: ZF

## 2019-04-16 ENCOUNTER — NURSE TRIAGE (OUTPATIENT)
Dept: NURSING | Facility: CLINIC | Age: 29
End: 2019-04-16

## 2019-04-16 ENCOUNTER — HOSPITAL ENCOUNTER (EMERGENCY)
Facility: CLINIC | Age: 29
Discharge: HOME | End: 2019-04-16
Payer: COMMERCIAL

## 2019-04-16 NOTE — TELEPHONE ENCOUNTER
FNA triage call :   Presenting problem :  Pt Suspect Rt ankle Fx , fell @ 2pm today at [facility] . Currently : no fever ,   Guideline used : foot and ankle injury - adult   Disposition and recommendations : will have someone drive him to [facility] ED and remain NPO and try not to put wt on R leg and Pt agrees.   Caller verbalizes understanding and denies further questions and will call back if further symptoms to triage or questions  . Priscila Varela RN  - [place] Nurse Advisor     Reason for Disposition    Sounds like a serious injury to the triager    Additional Information    Negative: Serious injury with multiple fractures    Negative: [1] Major bleeding (e.g., actively dripping or spurting) AND [2] can't be stopped    Negative: Amputation    Negative: Looks like a dislocated joint (very crooked or deformed)    Negative: Sounds like a life-threatening emergency to the triager    Negative: Wound looks infected    Negative: Caused by an animal bite    Negative: Caused by a human bite    Negative: Puncture wound of foot    Negative: Toe injury is main concern    Negative: Bullet wound, stabbed by knife, or other serious penetrating wound    Negative: Skin is split open or gaping  (or length > 1/2 inch or 12 mm)    Negative: [1] Bleeding AND [2] won't stop after 10 minutes of direct pressure (using correct technique)    Negative: [1] Dirt in the wound AND [2] not removed with 15 minutes of scrubbing    Negative: Can't stand (bear weight) or walk    Negative: [1] Numbness (new loss of sensation) of toe(s) AND [2] present now    Protocols used: FOOT AND ANKLE INJURY-ADULT-

## 2019-04-23 ENCOUNTER — ALLIED HEALTH/NURSE VISIT (OUTPATIENT)
Dept: PULMONOLOGY | Facility: CLINIC | Age: 29
End: 2019-04-23
Attending: ALLERGY & IMMUNOLOGY
Payer: COMMERCIAL

## 2019-04-23 DIAGNOSIS — Z51.6 NEED FOR DESENSITIZATION TO ALLERGENS: Primary | ICD-10-CM

## 2019-04-23 PROCEDURE — 95117 IMMUNOTHERAPY INJECTIONS: CPT | Mod: ZF

## 2019-04-23 NOTE — NURSING NOTE
Chief Complaint   Patient presents with     Allied Health Visit     Allergy injection   Patient came in for monthly Allergy injection. Patient had no reaction to last weeks shots. I gave 0.5 ml of the red bottle sub X 3 cutaneous without any issues.   Patient instructed to wait in building for at least 30 min. after injections  Patients comes into clinic today at the request of Charlie Rivera for allergy injections    No reaction to last injection.    This service provided today was under the direct supervision of BELLO Henry, who was available if needed.    Veronique Berrios, CMA

## 2019-05-06 ENCOUNTER — MYC REFILL (OUTPATIENT)
Dept: FAMILY MEDICINE | Facility: CLINIC | Age: 29
End: 2019-05-06

## 2019-05-06 ENCOUNTER — MYC REFILL (OUTPATIENT)
Dept: PULMONOLOGY | Facility: CLINIC | Age: 29
End: 2019-05-06

## 2019-05-06 DIAGNOSIS — F41.9 ANXIETY: ICD-10-CM

## 2019-05-06 DIAGNOSIS — J30.1 SEASONAL ALLERGIC RHINITIS DUE TO POLLEN: ICD-10-CM

## 2019-05-06 RX ORDER — AZELASTINE 1 MG/ML
1 SPRAY, METERED NASAL 2 TIMES DAILY
Qty: 3 BOTTLE | Refills: 1 | Status: SHIPPED | OUTPATIENT
Start: 2019-05-06

## 2019-05-06 RX ORDER — PROPRANOLOL HYDROCHLORIDE 10 MG/1
10 TABLET ORAL
Qty: 90 TABLET | Refills: 3 | OUTPATIENT
Start: 2019-05-06

## 2019-05-23 ENCOUNTER — ALLIED HEALTH/NURSE VISIT (OUTPATIENT)
Dept: PULMONOLOGY | Facility: CLINIC | Age: 29
End: 2019-05-23
Payer: COMMERCIAL

## 2019-05-23 DIAGNOSIS — Z51.6 NEED FOR DESENSITIZATION TO ALLERGENS: Primary | ICD-10-CM

## 2019-05-23 PROCEDURE — 95117 IMMUNOTHERAPY INJECTIONS: CPT | Mod: ZF

## 2019-06-03 ENCOUNTER — MYC MEDICAL ADVICE (OUTPATIENT)
Dept: FAMILY MEDICINE | Facility: CLINIC | Age: 29
End: 2019-06-03

## 2019-06-05 ENCOUNTER — OFFICE VISIT (OUTPATIENT)
Dept: INTERNAL MEDICINE | Facility: CLINIC | Age: 29
End: 2019-06-05
Payer: COMMERCIAL

## 2019-06-05 VITALS
SYSTOLIC BLOOD PRESSURE: 104 MMHG | DIASTOLIC BLOOD PRESSURE: 65 MMHG | BODY MASS INDEX: 23.92 KG/M2 | TEMPERATURE: 99 F | OXYGEN SATURATION: 97 % | WEIGHT: 180.5 LBS | HEART RATE: 71 BPM | HEIGHT: 73 IN

## 2019-06-05 DIAGNOSIS — J30.89 SEASONAL ALLERGIC RHINITIS DUE TO OTHER ALLERGIC TRIGGER: Primary | ICD-10-CM

## 2019-06-05 ASSESSMENT — MIFFLIN-ST. JEOR: SCORE: 1835

## 2019-06-05 ASSESSMENT — PAIN SCALES - GENERAL: PAINLEVEL: NO PAIN (0)

## 2019-06-05 NOTE — PROGRESS NOTES
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  He is been in good health exception of seasonal allergies.  He has had seasonal allergies for many years he is been on immunotherapy recently with good results.  He has had less rhinorrhea and congestion with this and less need for medication.  Otherwise he is been in good health is exercising regularly tolerating this well without exercise intolerance or other complaints.  Past Medical History:   Diagnosis Date     Clubbing of nail      LILI (obstructive sleep apnea)      Seasonal allergies     requires immunotherapy     Past Surgical History:   Procedure Laterality Date     left arm fracture      open repair     TONSILLECTOMY & ADENOIDECTOMY  1993     Family History   Problem Relation Age of Onset     Lupus Mother 30     Lupus Maternal Aunt 38     Skin Cancer Cousin      Melanoma Other      Social History     Socioeconomic History     Marital status: Single     Spouse name: None     Number of children: None     Years of education: None     Highest education level: None   Occupational History     None   Social Needs     Financial resource strain: None     Food insecurity:     Worry: None     Inability: None     Transportation needs:     Medical: None     Non-medical: None   Tobacco Use     Smoking status: Never Smoker     Smokeless tobacco: Never Used   Substance and Sexual Activity     Alcohol use: No     Alcohol/week: 0.0 oz     Drug use: No     Comment: marijuana in the past     Sexual activity: Yes     Partners: Female   Lifestyle     Physical activity:     Days per week: None     Minutes per session: None     Stress: None   Relationships     Social connections:     Talks on phone: None     Gets together: None     Attends Muslim service: None     Active member of club or organization: None     Attends meetings of clubs or organizations: None     Relationship status: None     Intimate partner violence:     Fear of  "current or ex partner: None     Emotionally abused: None     Physically abused: None     Forced sexual activity: None   Other Topics Concern     Parent/sibling w/ CABG, MI or angioplasty before 65F 55M? 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    Exam:  /65 (BP Location: Right arm, Patient Position: Sitting, Cuff Size: Adult Regular)   Pulse 71   Temp 99  F (37.2  C) (Oral)   Ht 1.866 m (6' 1.47\")   Wt 81.9 kg (180 lb 8 oz)   SpO2 97%   BMI 23.51 kg/m    180 lbs 8 oz  Physical Exam   The patient is alert, oriented with a clear sensorium.   Skin shows no lesions or rashes and good turgor.   Head is normocephalic and atraumatic.   Eyes show PERRLA with benign optic fundi.   Ears show cerumen bilaterally.   Mouth shows clear oral mucosa.   Neck shows no nodes, thyromegaly or bruits.   Back is non tender.  Lungs are clear to percussion and auscultation.   Heart shows normal S1 and S2 without murmur or gallop.   Abdomen is soft and nontender without masses or organomegaly.   Genitalia show normal testes. No evidence of inguinal hernia.  Extremities show no edema and no evidence of active synovitis.   Neurologic examination shows cranial nerves II-XII intact. Motor shows 5/5 strength. Reflexes are symmetric. Cerebellar testing shows normal tandem gait.  Romberg negative.      ASSESSMENT  1 seasonal allergies    Plan patient is healthy encouraged to participate in regular exercise program and in a regular healthy diet    This note was completed using Dragon voice recognition software.  Although reviewed after completion, some word and grammatical errors may occur.    Arpan James MD  General Internal Medicine  [facility]  [phone]        "

## 2019-06-05 NOTE — NURSING NOTE
Chief Complaint   Patient presents with     Physical     pt here for physical     Forms     pt has form to be filled out       Kailyn Germain CMA at 9:18 AM on 6/5/2019.

## 2019-06-05 NOTE — PATIENT INSTRUCTIONS
[facility] Medication Refill Request Information:  * Please contact your pharmacy regarding ANY request for medication refills.  ** [facility] Prescription Fax = [phone]  * Please allow 3 business days for routine medication refills.  * Please allow 5 business days for controlled substance medication refills.     [facility] Test Result notification information:  *You will be notified with in 7-10 days of your appointment day regarding the results of your test.  If you are on MyChart you will be notified as soon as the provider has reviewed the results and signed off on them.    [facility]: [phone]

## 2019-08-23 ENCOUNTER — TRANSFERRED RECORDS (OUTPATIENT)
Dept: HEALTH INFORMATION MANAGEMENT | Facility: CLINIC | Age: 29
End: 2019-08-23

## 2019-09-23 DIAGNOSIS — J30.1 CHRONIC SEASONAL ALLERGIC RHINITIS DUE TO POLLEN: ICD-10-CM

## 2020-01-31 ENCOUNTER — TELEPHONE (OUTPATIENT)
Dept: ALLERGY | Facility: CLINIC | Age: 30
End: 2020-01-31

## 2020-01-31 NOTE — TELEPHONE ENCOUNTER
Called patient to see if he is interested in continuing allergy shots. Patient stated he moved and is receiving them there.

## 2020-03-10 ENCOUNTER — HEALTH MAINTENANCE LETTER (OUTPATIENT)
Age: 30
End: 2020-03-10

## 2020-12-27 ENCOUNTER — HEALTH MAINTENANCE LETTER (OUTPATIENT)
Age: 30
End: 2020-12-27

## 2021-10-08 ENCOUNTER — IMMUNIZATION (OUTPATIENT)
Dept: INTERNAL MEDICINE | Age: 31
End: 2021-10-08

## 2021-10-08 DIAGNOSIS — Z23 NEED FOR VACCINATION: Primary | ICD-10-CM

## 2021-10-08 PROCEDURE — 0001A COVID 19 PFIZER-BIONTECH: CPT | Performed by: INTERNAL MEDICINE

## 2021-10-08 PROCEDURE — 91300 COVID 19 PFIZER-BIONTECH: CPT | Performed by: INTERNAL MEDICINE

## 2021-10-09 ENCOUNTER — HEALTH MAINTENANCE LETTER (OUTPATIENT)
Age: 31
End: 2021-10-09

## 2022-01-29 ENCOUNTER — HEALTH MAINTENANCE LETTER (OUTPATIENT)
Age: 32
End: 2022-01-29

## 2022-09-11 ENCOUNTER — HEALTH MAINTENANCE LETTER (OUTPATIENT)
Age: 32
End: 2022-09-11

## 2022-09-16 NOTE — MR AVS SNAPSHOT
After Visit Summary   3/10/2017    Brett Orosco    MRN: [id number]           Patient Information     Date Of Birth          1990        Visit Information        Provider Department      3/10/2017 9:00 AM [facility] NURSE [facility]        Today's Diagnoses     Need for desensitization to allergens    -  1       Follow-ups after your visit        Your next 10 appointments already scheduled     Mar 20, 2017  9:00 AM CDT   Nurse Visit with [facility] NURSE   [facility] ([facility])    [address] [place] [address]  3rd [facility] [address] [phone]              Who to contact     If you have questions or need follow up information about today's clinic visit or your schedule please contact [facility] directly at [phone].  Normal or non-critical lab and imaging results will be communicated to you by DXYhart, letter or phone within 4 business days after the clinic has received the results. If you do not hear from us within 7 days, please contact the clinic through DXYhart or phone. If you have a critical or abnormal lab result, we will notify you by phone as soon as possible.  Submit refill requests through Connectloud or call your pharmacy and they will forward the refill request to us. Please allow 3 business days for your refill to be completed.          Additional Information About Your Visit        MyChart Information     Conne[employer] gives you secure access to your electronic health record. If you see a primary care provider, you can also send messages to your care team and make appointments. If you have questions, please call your primary care clinic.  If you do not have a primary care provider, please call [phone] and they will assist you.        Care EveryWhere ID     This is your Care EveryWhere ID. This could be used by other organizations to access your [place]  medical records  [id number]         Blood Pressure from Last 3 Encounters:   02/28/17 103/60   01/18/17 96/61   01/10/17 103/60    Weight from Last 3 Encounters:   02/28/17 78 kg (172 lb)   01/10/17 76.2 kg (168 lb)   01/05/17 79.3 kg (174 lb 12.8 oz)              Today, you had the following     No orders found for display       Primary Care Provider Office Phone # Fax #    Robert Sher [phone]        [facility] [address] [facility] [address]  [facility] [address]        Thank you!     Thank you for choosing [facility]  for your care. Our goal is always to provide you with excellent care. Hearing back from our patients is one way we can continue to improve our services. Please take a few minutes to complete the written survey that you may receive in the mail after your visit with us. Thank you!             Your Updated Medication List - Protect others around you: Learn how to safely use, store and throw away your medicines at www.disposemymeds.org.          This list is accurate as of: 3/10/17  5:00 PM.  Always use your most recent med list.                   Brand Name Dispense Instructions for use    CLARITIN PO      Take 10 mg by mouth as needed       fluticasone 50 MCG/ACT spray    FLONASE    1 Bottle    Spray 2 sprays into both nostrils daily       vitamin D3 2000 UNITS Caps             Debridement Text: The wound edges were debrided prior to proceeding with the closure to facilitate wound healing.

## 2022-10-14 ENCOUNTER — APPOINTMENT (OUTPATIENT)
Dept: INTERNAL MEDICINE | Age: 32
End: 2022-10-14

## 2023-05-06 ENCOUNTER — HEALTH MAINTENANCE LETTER (OUTPATIENT)
Age: 33
End: 2023-05-06

## (undated) RX ORDER — ALBUTEROL SULFATE 0.83 MG/ML
SOLUTION RESPIRATORY (INHALATION)
Status: DISPENSED
Start: 2017-04-10